# Patient Record
Sex: MALE | Race: WHITE | NOT HISPANIC OR LATINO
[De-identification: names, ages, dates, MRNs, and addresses within clinical notes are randomized per-mention and may not be internally consistent; named-entity substitution may affect disease eponyms.]

---

## 2017-03-08 DIAGNOSIS — Z82.49 FAMILY HISTORY OF ISCHEMIC HEART DISEASE AND OTHER DISEASES OF THE CIRCULATORY SYSTEM: ICD-10-CM

## 2017-03-08 DIAGNOSIS — Z87.19 PERSONAL HISTORY OF OTHER DISEASES OF THE DIGESTIVE SYSTEM: ICD-10-CM

## 2017-03-09 ENCOUNTER — APPOINTMENT (OUTPATIENT)
Dept: PULMONOLOGY | Facility: CLINIC | Age: 67
End: 2017-03-09

## 2017-03-09 VITALS
OXYGEN SATURATION: 90 % | DIASTOLIC BLOOD PRESSURE: 74 MMHG | HEART RATE: 117 BPM | HEIGHT: 69 IN | BODY MASS INDEX: 28.58 KG/M2 | SYSTOLIC BLOOD PRESSURE: 144 MMHG | WEIGHT: 193 LBS

## 2017-03-28 ENCOUNTER — APPOINTMENT (OUTPATIENT)
Dept: PULMONOLOGY | Facility: CLINIC | Age: 67
End: 2017-03-28

## 2017-05-17 ENCOUNTER — APPOINTMENT (OUTPATIENT)
Dept: PULMONOLOGY | Facility: CLINIC | Age: 67
End: 2017-05-17

## 2017-05-17 VITALS
WEIGHT: 187 LBS | DIASTOLIC BLOOD PRESSURE: 80 MMHG | SYSTOLIC BLOOD PRESSURE: 120 MMHG | BODY MASS INDEX: 27.62 KG/M2 | OXYGEN SATURATION: 96 % | HEART RATE: 91 BPM

## 2017-05-17 RX ORDER — MECLIZINE HYDROCHLORIDE 25 MG/1
25 TABLET ORAL
Qty: 12 | Refills: 0 | Status: DISCONTINUED | COMMUNITY
Start: 2017-02-02

## 2017-05-17 RX ORDER — PREDNISONE 10 MG/1
10 TABLET ORAL
Qty: 12 | Refills: 0 | Status: DISCONTINUED | COMMUNITY
Start: 2017-03-22

## 2017-05-25 ENCOUNTER — CLINICAL ADVICE (OUTPATIENT)
Age: 67
End: 2017-05-25

## 2017-08-28 ENCOUNTER — APPOINTMENT (OUTPATIENT)
Dept: INTERNAL MEDICINE | Facility: CLINIC | Age: 67
End: 2017-08-28
Payer: COMMERCIAL

## 2017-08-28 PROCEDURE — 36415 COLL VENOUS BLD VENIPUNCTURE: CPT

## 2017-08-28 PROCEDURE — 99204 OFFICE O/P NEW MOD 45 MIN: CPT | Mod: 25

## 2017-09-08 ENCOUNTER — TRANSCRIPTION ENCOUNTER (OUTPATIENT)
Age: 67
End: 2017-09-08

## 2017-09-11 ENCOUNTER — OUTPATIENT (OUTPATIENT)
Dept: OUTPATIENT SERVICES | Facility: HOSPITAL | Age: 67
LOS: 1 days | End: 2017-09-11
Payer: COMMERCIAL

## 2017-09-11 VITALS
TEMPERATURE: 97 F | HEART RATE: 96 BPM | SYSTOLIC BLOOD PRESSURE: 122 MMHG | WEIGHT: 216.93 LBS | HEIGHT: 73 IN | DIASTOLIC BLOOD PRESSURE: 80 MMHG | RESPIRATION RATE: 16 BRPM

## 2017-09-11 DIAGNOSIS — I73.9 PERIPHERAL VASCULAR DISEASE, UNSPECIFIED: Chronic | ICD-10-CM

## 2017-09-11 DIAGNOSIS — Z98.890 OTHER SPECIFIED POSTPROCEDURAL STATES: Chronic | ICD-10-CM

## 2017-09-11 DIAGNOSIS — Z01.818 ENCOUNTER FOR OTHER PREPROCEDURAL EXAMINATION: ICD-10-CM

## 2017-09-11 LAB
APTT BLD: 41 SEC — HIGH (ref 27.5–37.4)
BASOPHILS # BLD AUTO: 0 K/UL — SIGNIFICANT CHANGE UP (ref 0–0.2)
BASOPHILS NFR BLD AUTO: 0.2 % — SIGNIFICANT CHANGE UP (ref 0–2)
EOSINOPHIL # BLD AUTO: 0.6 K/UL — HIGH (ref 0–0.5)
EOSINOPHIL NFR BLD AUTO: 4.3 % — SIGNIFICANT CHANGE UP (ref 0–5)
HCT VFR BLD CALC: 40.7 % — LOW (ref 42–52)
HGB BLD-MCNC: 14.2 G/DL — SIGNIFICANT CHANGE UP (ref 14–18)
INR BLD: 1.08 RATIO — SIGNIFICANT CHANGE UP (ref 0.88–1.16)
LYMPHOCYTES # BLD AUTO: 2.6 K/UL — SIGNIFICANT CHANGE UP (ref 1–4.8)
LYMPHOCYTES # BLD AUTO: 20.3 % — SIGNIFICANT CHANGE UP (ref 20–55)
MCHC RBC-ENTMCNC: 30.6 PG — SIGNIFICANT CHANGE UP (ref 27–31)
MCHC RBC-ENTMCNC: 34.9 G/DL — SIGNIFICANT CHANGE UP (ref 32–36)
MCV RBC AUTO: 87.7 FL — SIGNIFICANT CHANGE UP (ref 80–94)
MONOCYTES # BLD AUTO: 0.6 K/UL — SIGNIFICANT CHANGE UP (ref 0–0.8)
MONOCYTES NFR BLD AUTO: 4.4 % — SIGNIFICANT CHANGE UP (ref 3–10)
NEUTROPHILS # BLD AUTO: 8.9 K/UL — HIGH (ref 1.8–8)
NEUTROPHILS NFR BLD AUTO: 70.6 % — SIGNIFICANT CHANGE UP (ref 37–73)
PLATELET # BLD AUTO: 183 K/UL — SIGNIFICANT CHANGE UP (ref 150–400)
PROTHROM AB SERPL-ACNC: 11.9 SEC — SIGNIFICANT CHANGE UP (ref 9.8–12.7)
RBC # BLD: 4.64 M/UL — SIGNIFICANT CHANGE UP (ref 4.6–6.2)
RBC # FLD: 14.6 % — SIGNIFICANT CHANGE UP (ref 11–15.6)
WBC # BLD: 12.7 K/UL — HIGH (ref 4.8–10.8)
WBC # FLD AUTO: 12.7 K/UL — HIGH (ref 4.8–10.8)

## 2017-09-11 PROCEDURE — 36415 COLL VENOUS BLD VENIPUNCTURE: CPT

## 2017-09-11 PROCEDURE — G0463: CPT

## 2017-09-11 PROCEDURE — 85610 PROTHROMBIN TIME: CPT

## 2017-09-11 PROCEDURE — 85027 COMPLETE CBC AUTOMATED: CPT

## 2017-09-11 PROCEDURE — 85730 THROMBOPLASTIN TIME PARTIAL: CPT

## 2017-09-11 RX ORDER — SODIUM CHLORIDE 9 MG/ML
3 INJECTION INTRAMUSCULAR; INTRAVENOUS; SUBCUTANEOUS ONCE
Qty: 0 | Refills: 0 | Status: DISCONTINUED | OUTPATIENT
Start: 2017-09-19 | End: 2017-10-04

## 2017-09-11 NOTE — H&P PST ADULT - NSANTHOSAYNRD_GEN_A_CORE
No. DYAN screening performed.  STOP BANG Legend: 0-2 = LOW Risk; 3-4 = INTERMEDIATE Risk; 5-8 = HIGH Risk

## 2017-09-11 NOTE — ASU PATIENT PROFILE, ADULT - PMH
Bipolar 1 disorder    Bladder cancer  2009  Cataract    Emphysema of lung    Lung cancer  2014  PVD (peripheral vascular disease)  stents X2  Sinus infection    Thyroid nodule

## 2017-09-11 NOTE — H&P PST ADULT - PMH
Bipolar 1 disorder    Bladder cancer  2009  Emphysema of lung    Lung cancer  2014  PAD (peripheral artery disease)  stents X2 2011  Sinus infection Bipolar 1 disorder    Bladder cancer  2009  Cataract    Emphysema of lung    Lung cancer  2014  PVD (peripheral vascular disease)  stents X2  Sinus infection    Thyroid nodule

## 2017-09-11 NOTE — H&P PST ADULT - HISTORY OF PRESENT ILLNESS
Pt is a 67 y.o male with increase confusion and forgetfullness starting January of this year as per the wife. +RPR as per psychologist. Now schedule for lumbar puncture.

## 2017-09-11 NOTE — H&P PST ADULT - FAMILY HISTORY
Sibling  Still living? Yes, Estimated age: Age Unknown  Family history of other heart disease, Age at diagnosis: Age Unknown  Family history of diabetes mellitus, Age at diagnosis: 51-60     Father  Still living? No  Family history of lung cancer, Age at diagnosis: 61-70

## 2017-09-13 ENCOUNTER — APPOINTMENT (OUTPATIENT)
Dept: PULMONOLOGY | Facility: CLINIC | Age: 67
End: 2017-09-13

## 2017-09-19 ENCOUNTER — OUTPATIENT (OUTPATIENT)
Dept: OUTPATIENT SERVICES | Facility: HOSPITAL | Age: 67
LOS: 1 days | End: 2017-09-19
Payer: COMMERCIAL

## 2017-09-19 VITALS
HEART RATE: 84 BPM | DIASTOLIC BLOOD PRESSURE: 75 MMHG | SYSTOLIC BLOOD PRESSURE: 134 MMHG | OXYGEN SATURATION: 98 % | RESPIRATION RATE: 14 BRPM

## 2017-09-19 VITALS
DIASTOLIC BLOOD PRESSURE: 79 MMHG | TEMPERATURE: 98 F | HEIGHT: 73 IN | HEART RATE: 91 BPM | SYSTOLIC BLOOD PRESSURE: 110 MMHG | RESPIRATION RATE: 16 BRPM | WEIGHT: 216.93 LBS | OXYGEN SATURATION: 100 %

## 2017-09-19 DIAGNOSIS — Z98.890 OTHER SPECIFIED POSTPROCEDURAL STATES: Chronic | ICD-10-CM

## 2017-09-19 DIAGNOSIS — A52.8 LATE SYPHILIS, LATENT: ICD-10-CM

## 2017-09-19 DIAGNOSIS — I73.9 PERIPHERAL VASCULAR DISEASE, UNSPECIFIED: Chronic | ICD-10-CM

## 2017-09-19 DIAGNOSIS — Z01.818 ENCOUNTER FOR OTHER PREPROCEDURAL EXAMINATION: ICD-10-CM

## 2017-09-19 LAB
APPEARANCE CSF: CLEAR — SIGNIFICANT CHANGE UP
COLOR CSF: SIGNIFICANT CHANGE UP
CRYPTOC AG CSF-ACNC: NEGATIVE — SIGNIFICANT CHANGE UP
GLUCOSE CSF-MCNC: 54 MG/DL — SIGNIFICANT CHANGE UP (ref 40–70)
GRAM STN FLD: SIGNIFICANT CHANGE UP
NEUTROPHILS # CSF: SIGNIFICANT CHANGE UP %
NRBC NFR CSF: 0 /UL — SIGNIFICANT CHANGE UP (ref 0–5)
PROT CSF-MCNC: 88 MG/DL — HIGH (ref 15–45)
RBC # CSF: 0 /CMM — SIGNIFICANT CHANGE UP (ref 0–1)
SPECIMEN SOURCE: SIGNIFICANT CHANGE UP
TUBE TYPE: SIGNIFICANT CHANGE UP

## 2017-09-19 PROCEDURE — 83615 LACTATE (LD) (LDH) ENZYME: CPT

## 2017-09-19 PROCEDURE — 62270 DX LMBR SPI PNXR: CPT

## 2017-09-19 PROCEDURE — 86592 SYPHILIS TEST NON-TREP QUAL: CPT

## 2017-09-19 PROCEDURE — 89051 BODY FLUID CELL COUNT: CPT

## 2017-09-19 PROCEDURE — 82945 GLUCOSE OTHER FLUID: CPT

## 2017-09-19 PROCEDURE — 87116 MYCOBACTERIA CULTURE: CPT

## 2017-09-19 PROCEDURE — 77003 FLUOROGUIDE FOR SPINE INJECT: CPT | Mod: 26

## 2017-09-19 PROCEDURE — 84157 ASSAY OF PROTEIN OTHER: CPT

## 2017-09-19 PROCEDURE — 86403 PARTICLE AGGLUT ANTBDY SCRN: CPT

## 2017-09-19 PROCEDURE — 87070 CULTURE OTHR SPECIMN AEROBIC: CPT

## 2017-09-19 PROCEDURE — 87205 SMEAR GRAM STAIN: CPT

## 2017-09-19 PROCEDURE — 87102 FUNGUS ISOLATION CULTURE: CPT

## 2017-09-19 PROCEDURE — 77003 FLUOROGUIDE FOR SPINE INJECT: CPT

## 2017-09-19 RX ORDER — OXYCODONE AND ACETAMINOPHEN 5; 325 MG/1; MG/1
1 TABLET ORAL ONCE
Qty: 0 | Refills: 0 | Status: DISCONTINUED | OUTPATIENT
Start: 2017-09-19 | End: 2017-09-19

## 2017-09-19 NOTE — ASU DISCHARGE PLAN (ADULT/PEDIATRIC). - NOTIFY
Bleeding that does not stop/worsening headache/Persistent Nausea and Vomiting/Fever greater than 101

## 2017-09-19 NOTE — ASU DISCHARGE PLAN (ADULT/PEDIATRIC). - MEDICATION SUMMARY - MEDICATIONS TO TAKE
I will START or STAY ON the medications listed below when I get home from the hospital:    clonazePAM  -- 1 milligram(s) by mouth 2 times a day  -- Indication: For as per PMD    ziprasidone  -- 60 milligram(s) by mouth once a day  -- Indication: For as per PMD    pantoprazole 40 mg oral delayed release tablet  -- 1 tab(s) by mouth once a day  -- Indication: For as per PMD

## 2017-09-20 LAB
NIGHT BLUE STAIN TISS: SIGNIFICANT CHANGE UP
SPECIMEN SOURCE: SIGNIFICANT CHANGE UP

## 2017-09-21 LAB
LDH SERPL L TO P-CCNC: 19 U/L — SIGNIFICANT CHANGE UP
VDRL CSF-TITR: NEGATIVE — SIGNIFICANT CHANGE UP

## 2017-09-23 LAB
CULTURE RESULTS: SIGNIFICANT CHANGE UP
SPECIMEN SOURCE: SIGNIFICANT CHANGE UP

## 2017-09-26 ENCOUNTER — APPOINTMENT (OUTPATIENT)
Dept: INTERNAL MEDICINE | Facility: CLINIC | Age: 67
End: 2017-09-26
Payer: COMMERCIAL

## 2017-09-26 PROCEDURE — 99214 OFFICE O/P EST MOD 30 MIN: CPT

## 2017-10-02 ENCOUNTER — OUTPATIENT (OUTPATIENT)
Dept: OUTPATIENT SERVICES | Facility: HOSPITAL | Age: 67
LOS: 1 days | End: 2017-10-02
Payer: MEDICARE

## 2017-10-02 DIAGNOSIS — Z98.890 OTHER SPECIFIED POSTPROCEDURAL STATES: Chronic | ICD-10-CM

## 2017-10-02 DIAGNOSIS — A52.3 NEUROSYPHILIS, UNSPECIFIED: ICD-10-CM

## 2017-10-02 DIAGNOSIS — I73.9 PERIPHERAL VASCULAR DISEASE, UNSPECIFIED: Chronic | ICD-10-CM

## 2017-10-02 PROCEDURE — 71010: CPT | Mod: 26

## 2017-10-02 PROCEDURE — 71045 X-RAY EXAM CHEST 1 VIEW: CPT

## 2017-10-02 PROCEDURE — 36569 INSJ PICC 5 YR+ W/O IMAGING: CPT

## 2017-10-02 RX ORDER — PENICILLIN G POTASSIUM 5000000 [IU]/1
2.4 POWDER, FOR SOLUTION INTRAMUSCULAR; INTRAPLEURAL; INTRATHECAL; INTRAVENOUS ONCE
Qty: 0 | Refills: 0 | Status: DISCONTINUED | OUTPATIENT
Start: 2017-10-02 | End: 2017-10-17

## 2017-10-03 NOTE — PROCEDURE NOTE - PROCEDURE
<<-----Click on this checkbox to enter Procedure PICC line placement  10/03/2017    Active  [id number]

## 2017-10-03 NOTE — PROCEDURE NOTE - NSPROCDETAILS_GEN_ALL_CORE
sterile dressing applied/sterile technique, catheter placed/ultrasound guidance/ultrasound assessment/location identified, draped/prepped, sterile technique used/supine position

## 2017-10-16 LAB
CULTURE RESULTS: SIGNIFICANT CHANGE UP
SPECIMEN SOURCE: SIGNIFICANT CHANGE UP

## 2017-10-26 ENCOUNTER — APPOINTMENT (OUTPATIENT)
Dept: INTERNAL MEDICINE | Facility: CLINIC | Age: 67
End: 2017-10-26
Payer: COMMERCIAL

## 2017-10-26 PROCEDURE — 99213 OFFICE O/P EST LOW 20 MIN: CPT

## 2017-11-08 LAB
CULTURE RESULTS: SIGNIFICANT CHANGE UP
SPECIMEN SOURCE: SIGNIFICANT CHANGE UP

## 2017-12-04 NOTE — ASU PREOP CHECKLIST - NOTHING BY MOUTH SINCE
18-Sep-2017 06:00 Problem: Patient Care Overview (Adult)  Goal: Plan of Care Review  Outcome: Ongoing (interventions implemented as appropriate)

## 2018-06-13 ENCOUNTER — APPOINTMENT (OUTPATIENT)
Dept: PULMONOLOGY | Facility: CLINIC | Age: 68
End: 2018-06-13
Payer: COMMERCIAL

## 2018-06-13 VITALS — HEIGHT: 69 IN | WEIGHT: 177 LBS | BODY MASS INDEX: 26.22 KG/M2

## 2018-06-13 VITALS — OXYGEN SATURATION: 96 % | DIASTOLIC BLOOD PRESSURE: 70 MMHG | HEART RATE: 103 BPM | SYSTOLIC BLOOD PRESSURE: 120 MMHG

## 2018-06-13 DIAGNOSIS — Z01.818 ENCOUNTER FOR OTHER PREPROCEDURAL EXAMINATION: ICD-10-CM

## 2018-06-13 DIAGNOSIS — J44.9 CHRONIC OBSTRUCTIVE PULMONARY DISEASE, UNSPECIFIED: ICD-10-CM

## 2018-06-13 DIAGNOSIS — Z00.00 ENCOUNTER FOR GENERAL ADULT MEDICAL EXAMINATION W/OUT ABNORMAL FINDINGS: ICD-10-CM

## 2018-06-13 PROCEDURE — 94727 GAS DIL/WSHOT DETER LNG VOL: CPT

## 2018-06-13 PROCEDURE — 99214 OFFICE O/P EST MOD 30 MIN: CPT | Mod: 25

## 2018-06-13 PROCEDURE — 85018 HEMOGLOBIN: CPT | Mod: QW

## 2018-06-13 PROCEDURE — 94729 DIFFUSING CAPACITY: CPT

## 2018-06-13 PROCEDURE — 99406 BEHAV CHNG SMOKING 3-10 MIN: CPT

## 2018-06-13 PROCEDURE — 94010 BREATHING CAPACITY TEST: CPT

## 2018-06-13 RX ORDER — CEPHALEXIN 500 MG/1
500 CAPSULE ORAL
Qty: 12 | Refills: 0 | Status: DISCONTINUED | COMMUNITY
Start: 2018-03-04 | End: 2018-06-13

## 2018-06-13 RX ORDER — NAPROXEN 500 MG/1
500 TABLET ORAL
Qty: 20 | Refills: 0 | Status: DISCONTINUED | COMMUNITY
Start: 2018-04-16 | End: 2018-06-13

## 2018-06-13 RX ORDER — LAMOTRIGINE 150 MG/1
150 TABLET ORAL
Refills: 0 | Status: DISCONTINUED | COMMUNITY
End: 2018-06-13

## 2018-06-13 RX ORDER — DICLOFENAC SODIUM 10 MG/G
1 GEL TOPICAL
Qty: 100 | Refills: 0 | Status: DISCONTINUED | COMMUNITY
Start: 2018-01-23 | End: 2018-06-13

## 2018-06-13 RX ORDER — ZIPRASIDONE 20 MG/1
CAPSULE ORAL
Refills: 0 | Status: DISCONTINUED | COMMUNITY
End: 2018-06-13

## 2018-06-13 RX ORDER — LEVOFLOXACIN 750 MG/1
750 TABLET, FILM COATED ORAL
Qty: 7 | Refills: 0 | Status: DISCONTINUED | COMMUNITY
Start: 2018-01-02 | End: 2018-06-13

## 2018-10-18 PROBLEM — I73.9 PERIPHERAL VASCULAR DISEASE, UNSPECIFIED: Chronic | Status: ACTIVE | Noted: 2017-09-11

## 2018-10-18 PROBLEM — H26.9 UNSPECIFIED CATARACT: Chronic | Status: ACTIVE | Noted: 2017-09-11

## 2018-10-23 ENCOUNTER — APPOINTMENT (OUTPATIENT)
Dept: INTERNAL MEDICINE | Facility: CLINIC | Age: 68
End: 2018-10-23
Payer: COMMERCIAL

## 2018-10-23 VITALS
SYSTOLIC BLOOD PRESSURE: 110 MMHG | BODY MASS INDEX: 26.22 KG/M2 | WEIGHT: 177 LBS | DIASTOLIC BLOOD PRESSURE: 70 MMHG | HEIGHT: 69 IN

## 2018-10-23 DIAGNOSIS — Z86.19 PERSONAL HISTORY OF OTHER INFECTIOUS AND PARASITIC DISEASES: ICD-10-CM

## 2018-10-23 PROCEDURE — 36415 COLL VENOUS BLD VENIPUNCTURE: CPT

## 2018-10-23 PROCEDURE — 99214 OFFICE O/P EST MOD 30 MIN: CPT | Mod: 25

## 2018-10-23 RX ORDER — LURASIDONE HYDROCHLORIDE 20 MG/1
20 TABLET, FILM COATED ORAL
Qty: 180 | Refills: 0 | Status: DISCONTINUED | COMMUNITY
Start: 2018-01-03 | End: 2018-10-17

## 2018-10-23 RX ORDER — CLONAZEPAM 0.5 MG/1
0.5 TABLET ORAL
Refills: 0 | Status: DISCONTINUED | COMMUNITY
End: 2018-10-23

## 2018-10-25 LAB — RPR SER-TITR: ABNORMAL

## 2019-04-29 ENCOUNTER — TRANSCRIPTION ENCOUNTER (OUTPATIENT)
Age: 69
End: 2019-04-29

## 2019-11-22 ENCOUNTER — RECORD ABSTRACTING (OUTPATIENT)
Age: 69
End: 2019-11-22

## 2019-11-22 DIAGNOSIS — Z82.49 FAMILY HISTORY OF ISCHEMIC HEART DISEASE AND OTHER DISEASES OF THE CIRCULATORY SYSTEM: ICD-10-CM

## 2019-11-22 DIAGNOSIS — Z78.9 OTHER SPECIFIED HEALTH STATUS: ICD-10-CM

## 2019-11-22 DIAGNOSIS — Z85.118 PERSONAL HISTORY OF OTHER MALIGNANT NEOPLASM OF BRONCHUS AND LUNG: ICD-10-CM

## 2019-11-22 DIAGNOSIS — Z83.3 FAMILY HISTORY OF DIABETES MELLITUS: ICD-10-CM

## 2019-11-25 ENCOUNTER — APPOINTMENT (OUTPATIENT)
Dept: CARDIOLOGY | Facility: CLINIC | Age: 69
End: 2019-11-25
Payer: MEDICARE

## 2019-11-25 ENCOUNTER — NON-APPOINTMENT (OUTPATIENT)
Age: 69
End: 2019-11-25

## 2019-11-25 VITALS
BODY MASS INDEX: 26.96 KG/M2 | SYSTOLIC BLOOD PRESSURE: 113 MMHG | RESPIRATION RATE: 15 BRPM | WEIGHT: 182 LBS | DIASTOLIC BLOOD PRESSURE: 78 MMHG | HEIGHT: 69 IN | HEART RATE: 99 BPM

## 2019-11-25 DIAGNOSIS — C67.9 MALIGNANT NEOPLASM OF BLADDER, UNSPECIFIED: ICD-10-CM

## 2019-11-25 DIAGNOSIS — Z82.49 FAMILY HISTORY OF ISCHEMIC HEART DISEASE AND OTHER DISEASES OF THE CIRCULATORY SYSTEM: ICD-10-CM

## 2019-11-25 PROCEDURE — 93000 ELECTROCARDIOGRAM COMPLETE: CPT

## 2019-11-25 PROCEDURE — 99205 OFFICE O/P NEW HI 60 MIN: CPT

## 2019-11-25 NOTE — ASSESSMENT
[FreeTextEntry1] : ECG: SR, LBBB \par \par  HDL 30 LDL 98  (11/2019) \par \par CT Chest 2018:  Scattered aortic calcifications and mural thrombus \par \par \par

## 2019-11-25 NOTE — PHYSICAL EXAM
[Normal Conjunctiva] : the conjunctiva exhibited no abnormalities [General Appearance - Well Developed] : well developed [General Appearance - Well Nourished] : well nourished [Normal Oropharynx] : normal oropharynx [Normal Jugular Venous V Waves Present] : normal jugular venous V waves present [Heart Rate And Rhythm] : heart rate and rhythm were normal [] : no respiratory distress [Respiration, Rhythm And Depth] : normal respiratory rhythm and effort [Abdomen Soft] : soft [Bowel Sounds] : normal bowel sounds [Abnormal Walk] : normal gait [Nail Clubbing] : no clubbing of the fingernails [Cyanosis, Localized] : no localized cyanosis [Skin Color & Pigmentation] : normal skin color and pigmentation [Oriented To Time, Place, And Person] : oriented to person, place, and time [Affect] : the affect was normal [Murmurs] : no murmurs present [FreeTextEntry1] : no edema. DP 1= bilaterally, cap refill = 3 seconds bilaterally

## 2019-11-25 NOTE — DISCUSSION/SUMMARY
[FreeTextEntry1] : Patient is a 68 yo M with tobacco dependence, HLD, COPD, aortic atherosclerosis with mural thrombus, PAD s/p PCI here for cardiac evaluation of a LBBB on ECG. PAtient had revascularization of his LE as well with signs of PAD on exam but no clear symptoms. Remains with limited functional capacity since chemo from lung cancer. STEVENSON partly COPD but needs ischemic eval for this and ECG findings. Will also evaluate his PAD. \par \par 1. Echo and pharm nuclear stress test to evaluate LBBB/STEVENSON in patient with smoking/atherosclerosis/HTN/HLD\par 2. PAD testing to evaluate known disease with aortic scan, LE duplex and EMILEE\par 3. Start ASA 81mg daily\par 4. Continue statin, recheck lipids with PMD as just started. May need fibrate/fish oils for TG\par 5. Recommend aggressive diet and lifestyle modifications  \par 6. Counselled for more than 3 minutes on smoking cessation\par 7. Obtain recent carotid duplex that was done to review\par 8. Follow up after testing\par 9. Regular PMD, ID and pulmonary follow up

## 2019-12-10 ENCOUNTER — APPOINTMENT (OUTPATIENT)
Dept: CARDIOLOGY | Facility: CLINIC | Age: 69
End: 2019-12-10
Payer: COMMERCIAL

## 2019-12-10 PROCEDURE — 93923 UPR/LXTR ART STDY 3+ LVLS: CPT

## 2019-12-12 ENCOUNTER — APPOINTMENT (OUTPATIENT)
Dept: CARDIOLOGY | Facility: CLINIC | Age: 69
End: 2019-12-12
Payer: COMMERCIAL

## 2019-12-12 PROCEDURE — 93925 LOWER EXTREMITY STUDY: CPT

## 2019-12-19 ENCOUNTER — APPOINTMENT (OUTPATIENT)
Dept: NEUROLOGY | Facility: CLINIC | Age: 69
End: 2019-12-19

## 2019-12-30 ENCOUNTER — APPOINTMENT (OUTPATIENT)
Dept: CARDIOLOGY | Facility: CLINIC | Age: 69
End: 2019-12-30
Payer: COMMERCIAL

## 2019-12-30 PROCEDURE — 78452 HT MUSCLE IMAGE SPECT MULT: CPT

## 2019-12-30 PROCEDURE — A9500: CPT

## 2019-12-30 PROCEDURE — 93015 CV STRESS TEST SUPVJ I&R: CPT

## 2019-12-30 RX ORDER — REGADENOSON 0.08 MG/ML
0.4 INJECTION, SOLUTION INTRAVENOUS
Qty: 4 | Refills: 0 | Status: COMPLETED | OUTPATIENT
Start: 2019-12-30

## 2019-12-30 RX ADMIN — REGADENOSON 0 MG/5ML: 0.08 INJECTION, SOLUTION INTRAVENOUS at 00:00

## 2019-12-31 RX ORDER — KIT FOR THE PREPARATION OF TECHNETIUM TC99M SESTAMIBI 1 MG/5ML
INJECTION, POWDER, LYOPHILIZED, FOR SOLUTION PARENTERAL
Refills: 0 | Status: COMPLETED | OUTPATIENT
Start: 2019-12-31

## 2019-12-31 RX ADMIN — KIT FOR THE PREPARATION OF TECHNETIUM TC99M SESTAMIBI 0: 1 INJECTION, POWDER, LYOPHILIZED, FOR SOLUTION PARENTERAL at 00:00

## 2020-01-09 ENCOUNTER — APPOINTMENT (OUTPATIENT)
Dept: CARDIOLOGY | Facility: CLINIC | Age: 70
End: 2020-01-09
Payer: COMMERCIAL

## 2020-01-09 PROCEDURE — 93351 STRESS TTE COMPLETE: CPT

## 2020-01-09 PROCEDURE — 93978 VASCULAR STUDY: CPT

## 2020-01-15 ENCOUNTER — NON-APPOINTMENT (OUTPATIENT)
Age: 70
End: 2020-01-15

## 2020-01-15 ENCOUNTER — APPOINTMENT (OUTPATIENT)
Dept: CARDIOLOGY | Facility: CLINIC | Age: 70
End: 2020-01-15
Payer: COMMERCIAL

## 2020-01-15 VITALS
SYSTOLIC BLOOD PRESSURE: 110 MMHG | RESPIRATION RATE: 14 BRPM | WEIGHT: 185 LBS | HEART RATE: 95 BPM | HEIGHT: 69 IN | DIASTOLIC BLOOD PRESSURE: 76 MMHG | BODY MASS INDEX: 27.4 KG/M2

## 2020-01-15 DIAGNOSIS — R94.31 ABNORMAL ELECTROCARDIOGRAM [ECG] [EKG]: ICD-10-CM

## 2020-01-15 DIAGNOSIS — R06.09 OTHER FORMS OF DYSPNEA: ICD-10-CM

## 2020-01-15 PROCEDURE — 93000 ELECTROCARDIOGRAM COMPLETE: CPT

## 2020-01-15 PROCEDURE — 99215 OFFICE O/P EST HI 40 MIN: CPT

## 2020-01-15 NOTE — DISCUSSION/SUMMARY
[FreeTextEntry1] : Patient is a 68 yo M with tobacco dependence, HLD, COPD, aortic atherosclerosis, AAA with mural thrombus,  PAD s/p PCI here for cardiac evaluation of a LBBB on ECG. Testing shows severe CMP and possibly ischemia with signs inferior infarct. CMP maybe ischemic, related to prior EtOH and substance abuse years ago, genetic. No recent infection due suggest myocarditis, will consider MRI. \par Patient denies symptoms but not very active. Wife notes he has been more short of breath and fatigued\par \par Has AAA with stent and may have further inflow disease not seen on imaging. Appears that patient had iliac disease as stent visualized in the distal aorta and appears to extend into iliac arteries.  NEeds to see vascular. No claudication at this time and mildly low EMILEE. Appears to have had iliac disease treated percutaneously and AAA with mural thrombus medically managed. \par \par . \par \par 1. LEft heart cath  to evaluate CMP and nuclear stress test findings suggestive of prior inferior infarct\par 2. Vascular evaluation, refer to Dr Sahu. \par 3. ASA/statin, increase atorvastatin to 20mg qhs\par 4. Start entresto 24/26mg bid and coreg 6.25mg po bid for CMP, check BMP/Mg in a couple weeks\par 5. Counselled for more than 3 minutes on smoking cessation\par 6. Follow up after cath\par 7. REgular PMD follow up\par

## 2020-01-15 NOTE — PHYSICAL EXAM
[General Appearance - Well Developed] : well developed [General Appearance - Well Nourished] : well nourished [Normal Conjunctiva] : the conjunctiva exhibited no abnormalities [Normal Jugular Venous V Waves Present] : normal jugular venous V waves present [Normal Oropharynx] : normal oropharynx [Respiration, Rhythm And Depth] : normal respiratory rhythm and effort [] : no respiratory distress [Heart Rate And Rhythm] : heart rate and rhythm were normal [Murmurs] : no murmurs present [Abdomen Soft] : soft [Bowel Sounds] : normal bowel sounds [Abnormal Walk] : normal gait [Nail Clubbing] : no clubbing of the fingernails [Cyanosis, Localized] : no localized cyanosis [Oriented To Time, Place, And Person] : oriented to person, place, and time [Skin Color & Pigmentation] : normal skin color and pigmentation [Affect] : the affect was normal [FreeTextEntry1] : distant S1S2

## 2020-01-15 NOTE — ASSESSMENT
[FreeTextEntry1] : ECG: SR, LBBB \par \par  HDL 30 LDL 98  (11/2019) \par \par CT Chest 2018:  Scattered aortic calcifications and mural thrombus \par \par ECHO 1/2020:\par 1. SEvere LV dysfunction, EF 20-25% with sevrere inferior/inferolateral hypo\par 2. Normal RV/LA/RA\par 3. Mild TR\par \par AORTIC SCAN\par 1. AAA with stent and mural thrombus\par \par NUCLEAR STRESS TEST 1/2020:\par 1.  INferior/inferoseptal infarct with EF 22%\par 2. No ischemia\par \par LE ARTERIAL DUPLEX\par 1. MOderate plaque\par 2. No hemodynamically significant stenosis\par \par EMILEE/PVR\par 1. R 0.86\par 2. L 0.82\par 3. PVR suggestive of inflow disease\par \par

## 2020-01-15 NOTE — HISTORY OF PRESENT ILLNESS
[FreeTextEntry1] : Patient is a 69 yo M with tobacco dependence,  HLD, COPD, PAD s/p PCI here for cardiac evaluation. Struggles with being able to quit smoking. On disability for lung cancer, now in remission. Had chemotherapy and under control he states. 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 Wife notes he is short of breath going up stairs. PAtient doesn’t notice it but very limited in activity. NO chest discomfort. Patient denies PND/orthopnea/edema/palpitations/syncope/claudication. Had been very active prior to chemotherapy in 2014, has been much less since then. Also memory a bit worse since. \par \par Symptoms stable since last visit. Patient underwent cardiac testing after last visit. \par \par ROS: GI and  negative

## 2020-01-29 ENCOUNTER — OUTPATIENT (OUTPATIENT)
Dept: OUTPATIENT SERVICES | Facility: HOSPITAL | Age: 70
LOS: 1 days | End: 2020-01-29
Payer: COMMERCIAL

## 2020-01-29 VITALS
SYSTOLIC BLOOD PRESSURE: 96 MMHG | DIASTOLIC BLOOD PRESSURE: 59 MMHG | TEMPERATURE: 98 F | HEIGHT: 73 IN | WEIGHT: 182.1 LBS | RESPIRATION RATE: 18 BRPM | OXYGEN SATURATION: 99 % | HEART RATE: 86 BPM

## 2020-01-29 DIAGNOSIS — Z86.79 PERSONAL HISTORY OF OTHER DISEASES OF THE CIRCULATORY SYSTEM: Chronic | ICD-10-CM

## 2020-01-29 DIAGNOSIS — R94.39 ABNORMAL RESULT OF OTHER CARDIOVASCULAR FUNCTION STUDY: ICD-10-CM

## 2020-01-29 DIAGNOSIS — Z90.89 ACQUIRED ABSENCE OF OTHER ORGANS: Chronic | ICD-10-CM

## 2020-01-29 DIAGNOSIS — Z98.890 OTHER SPECIFIED POSTPROCEDURAL STATES: Chronic | ICD-10-CM

## 2020-01-29 DIAGNOSIS — Z01.818 ENCOUNTER FOR OTHER PREPROCEDURAL EXAMINATION: ICD-10-CM

## 2020-01-29 DIAGNOSIS — I73.9 PERIPHERAL VASCULAR DISEASE, UNSPECIFIED: Chronic | ICD-10-CM

## 2020-01-29 LAB
ANION GAP SERPL CALC-SCNC: 10 MMOL/L — SIGNIFICANT CHANGE UP (ref 5–17)
APTT BLD: 47.5 SEC — HIGH (ref 27.5–36.3)
BASOPHILS # BLD AUTO: 0.06 K/UL — SIGNIFICANT CHANGE UP (ref 0–0.2)
BASOPHILS NFR BLD AUTO: 0.7 % — SIGNIFICANT CHANGE UP (ref 0–2)
BUN SERPL-MCNC: 21 MG/DL — HIGH (ref 8–20)
CALCIUM SERPL-MCNC: 9.8 MG/DL — SIGNIFICANT CHANGE UP (ref 8.6–10.2)
CHLORIDE SERPL-SCNC: 102 MMOL/L — SIGNIFICANT CHANGE UP (ref 98–107)
CO2 SERPL-SCNC: 24 MMOL/L — SIGNIFICANT CHANGE UP (ref 22–29)
CREAT SERPL-MCNC: 1.07 MG/DL — SIGNIFICANT CHANGE UP (ref 0.5–1.3)
EOSINOPHIL # BLD AUTO: 0.45 K/UL — SIGNIFICANT CHANGE UP (ref 0–0.5)
EOSINOPHIL NFR BLD AUTO: 5.3 % — SIGNIFICANT CHANGE UP (ref 0–6)
GLUCOSE SERPL-MCNC: 103 MG/DL — HIGH (ref 70–99)
HCT VFR BLD CALC: 43.1 % — SIGNIFICANT CHANGE UP (ref 39–50)
HGB BLD-MCNC: 14.3 G/DL — SIGNIFICANT CHANGE UP (ref 13–17)
IMM GRANULOCYTES NFR BLD AUTO: 0.1 % — SIGNIFICANT CHANGE UP (ref 0–1.5)
INR BLD: 1.11 RATIO — SIGNIFICANT CHANGE UP (ref 0.88–1.16)
LYMPHOCYTES # BLD AUTO: 2.75 K/UL — SIGNIFICANT CHANGE UP (ref 1–3.3)
LYMPHOCYTES # BLD AUTO: 32.5 % — SIGNIFICANT CHANGE UP (ref 13–44)
MCHC RBC-ENTMCNC: 29.2 PG — SIGNIFICANT CHANGE UP (ref 27–34)
MCHC RBC-ENTMCNC: 33.2 GM/DL — SIGNIFICANT CHANGE UP (ref 32–36)
MCV RBC AUTO: 88.1 FL — SIGNIFICANT CHANGE UP (ref 80–100)
MONOCYTES # BLD AUTO: 0.56 K/UL — SIGNIFICANT CHANGE UP (ref 0–0.9)
MONOCYTES NFR BLD AUTO: 6.6 % — SIGNIFICANT CHANGE UP (ref 2–14)
NEUTROPHILS # BLD AUTO: 4.64 K/UL — SIGNIFICANT CHANGE UP (ref 1.8–7.4)
NEUTROPHILS NFR BLD AUTO: 54.8 % — SIGNIFICANT CHANGE UP (ref 43–77)
PLATELET # BLD AUTO: 148 K/UL — LOW (ref 150–400)
POTASSIUM SERPL-MCNC: 4.5 MMOL/L — SIGNIFICANT CHANGE UP (ref 3.5–5.3)
POTASSIUM SERPL-SCNC: 4.5 MMOL/L — SIGNIFICANT CHANGE UP (ref 3.5–5.3)
PROTHROM AB SERPL-ACNC: 12.8 SEC — SIGNIFICANT CHANGE UP (ref 10–12.9)
RBC # BLD: 4.89 M/UL — SIGNIFICANT CHANGE UP (ref 4.2–5.8)
RBC # FLD: 13.3 % — SIGNIFICANT CHANGE UP (ref 10.3–14.5)
SODIUM SERPL-SCNC: 136 MMOL/L — SIGNIFICANT CHANGE UP (ref 135–145)
WBC # BLD: 8.47 K/UL — SIGNIFICANT CHANGE UP (ref 3.8–10.5)
WBC # FLD AUTO: 8.47 K/UL — SIGNIFICANT CHANGE UP (ref 3.8–10.5)

## 2020-01-29 PROCEDURE — 80048 BASIC METABOLIC PNL TOTAL CA: CPT

## 2020-01-29 PROCEDURE — 85730 THROMBOPLASTIN TIME PARTIAL: CPT

## 2020-01-29 PROCEDURE — 85610 PROTHROMBIN TIME: CPT

## 2020-01-29 PROCEDURE — G0463: CPT

## 2020-01-29 PROCEDURE — 93005 ELECTROCARDIOGRAM TRACING: CPT

## 2020-01-29 PROCEDURE — 93010 ELECTROCARDIOGRAM REPORT: CPT

## 2020-01-29 PROCEDURE — 85027 COMPLETE CBC AUTOMATED: CPT

## 2020-01-29 PROCEDURE — 36415 COLL VENOUS BLD VENIPUNCTURE: CPT

## 2020-01-29 RX ORDER — ZIPRASIDONE HYDROCHLORIDE 20 MG/1
60 CAPSULE ORAL
Qty: 0 | Refills: 0 | DISCHARGE

## 2020-01-29 NOTE — ASU PATIENT PROFILE, ADULT - PMH
Abnormal EKG    Aortic atherosclerosis    Bipolar 1 disorder    Bladder cancer  2009  Cataract    COPD (chronic obstructive pulmonary disease)    Emphysema of lung    History of syphilis    LBBB (left bundle branch block)    Lung cancer  2014  PAD (peripheral artery disease)    PVD (peripheral vascular disease)  stents X2  Sinus infection    Thyroid nodule

## 2020-01-29 NOTE — H&P PST ADULT - NSICDXPASTMEDICALHX_GEN_ALL_CORE_FT
PAST MEDICAL HISTORY:  Abnormal EKG     Aortic atherosclerosis     Bipolar 1 disorder     Bladder cancer 2009    Cataract     COPD (chronic obstructive pulmonary disease)     Emphysema of lung     History of ETOH abuse     History of syphilis     LBBB (left bundle branch block)     Lung cancer 2014    PAD (peripheral artery disease)     PVD (peripheral vascular disease) stents X2    Sinus infection     Thyroid nodule PAST MEDICAL HISTORY:  AAA (abdominal aortic aneurysm) with mural thrombus    Abnormal EKG     Aortic atherosclerosis     Bipolar 1 disorder     Bladder cancer 2009    Cataract     COPD (chronic obstructive pulmonary disease)     Emphysema of lung     History of ETOH abuse     History of syphilis     LBBB (left bundle branch block)     Lung cancer 2014    PAD (peripheral artery disease)     PVD (peripheral vascular disease) stents X2    Sinus infection     Thyroid nodule

## 2020-01-29 NOTE — H&P PST ADULT - ASSESSMENT
70 year old male with SOB and abnormal stress test and low EF for LHC to evaluate CMP.    ASA  Mallampati  GFR  Bleeding risk 70 year old male with SOB and abnormal stress test and low EF for LHC to evaluate CMP.    ASA 2-3  Mallampati 2  GFR  Bleeding risk 70 year old male with SOB and abnormal stress test and low EF for LHC to evaluate CMP.    ASA 2-3  Mallampati 2  GFR 69  Bleeding risk 1.3%

## 2020-01-29 NOTE — H&P PST ADULT - NSICDXPASTSURGICALHX_GEN_ALL_CORE_FT
PAST SURGICAL HISTORY:  History of AAA (abdominal aortic aneurysm) repair with stent and mural thrombus    History of femoral angiogram B/L stents    History of tonsillectomy     PVD (peripheral vascular disease) angiogram stents X2    S/P LASIK surgery PAST SURGICAL HISTORY:  History of femoral angiogram B/L stents    History of tonsillectomy     PVD (peripheral vascular disease) angiogram stents X2    S/P LASIK surgery

## 2020-01-29 NOTE — ASU PATIENT PROFILE, ADULT - AS SC BRADEN SENSORY
(4) no impairment
transfer from [facility] with abd and poss sbo by ct scan; transfer here due to md is here; had rlq pain since midnight (23 hours ago); also had vomiting of bile; pt has hx of chron's disease with multiple bowel resections; received 3 lilters of fluids and total of 6 mg dilaudid at [facility]; mom at bedside

## 2020-01-29 NOTE — ASU PATIENT PROFILE, ADULT - --DESCRIBE SURGICAL SITE
port-a-cath removed at [facility] jan 14 2020, derma bond and steri strips off, small openining on linear suture line  pink wound base, small yellow drainage, no redness, no swelling, pt to f/u with md on 1-30-20, dsd in place right anterior chest wall  port-a-cath removed at [facility] jan 14 2020, derma bond and steri strips off, small openining on linear suture line  pink wound base, small yellow drainage, no redness, no swelling, pt to f/u with md on 1-30-20, dsd in place

## 2020-01-29 NOTE — H&P PST ADULT - PRO TOBACCO QUIT READY
Pt says his MD (psych) says it would be a bad idea to take chantix..  Only smoking 2 cigs a day at present

## 2020-01-29 NOTE — ASU PATIENT PROFILE, ADULT - PSH
History of femoral angiogram  B/L stents  History of tonsillectomy    PVD (peripheral vascular disease)  angiogram stents X2  S/P LASIK surgery

## 2020-01-29 NOTE — H&P PST ADULT - NSICDXFAMILYHX_GEN_ALL_CORE_FT
FAMILY HISTORY:  Father  Still living? No  Family history of lung cancer, Age at diagnosis: 61-70    Sibling  Still living? Yes, Estimated age: Age Unknown  Family history of diabetes mellitus, Age at diagnosis: 51-60  Family history of other heart disease, Age at diagnosis: Age Unknown

## 2020-01-29 NOTE — H&P PST ADULT - HISTORY OF PRESENT ILLNESS
70 year old male with h/o tobacco dependence, lung cancer in remission with chemo, HLD, COPD, Bipolar , AAA with mural thrombus medically managed,PAD with illiac disease with stent in illac who c/o SOB with positive stress test and low EF.  Pt for [facility] to evaluate CMP     NST 1/2020: inferior/inferoseptal infarct with EF 22%  TTE 1/2020: severe LV dysfunction, EF 20-25%, severe inferior/inferolateral hypo, mild TR  LE arteral duplex: moderate plaque, no hemodynamically significant stenosis  EMILEE/PVR: R 0.86. L 0.82. PVR suggestive of inflow disease  Aortic scan: AAA with stent and mural thrombus 70 year old male with h/o tobacco dependence, lung cancer in remission with chemo, HLD, COPD, Bipolar , AAA with mural thrombus medically managed, PAD  (illiac disease) with stent in iliac who c/o STEVENSON with positive stress test and low EF.  Pt for [facility] to evaluate CMP     Risk factors include: smoking, PAD, chronic lung disease, low EF    NST 1/2020: inferior/inferoseptal infarct with EF 22%  TTE 1/2020: severe LV dysfunction, EF 20-25%, severe inferior/inferolateral hypo, mild TR  LE arteral duplex: moderate plaque, no hemodynamically significant stenosis  EMILEE/PVR: R 0.86. L 0.82. PVR suggestive of inflow disease  Aortic scan: AAA with stent and mural thrombus    Of note: Pt had R ACW port removed two weeks ago, site remains slightly open.  Pt instructed to follow up with Dr. Head.    Pt has appt with Maldonado tomorrow to assess AAA and iliac. 70 year old male with h/o tobacco dependence, lung cancer in remission with chemo, HLD, COPD, Bipolar , AAA with mural thrombus medically managed, PAD  (illiac disease) with stent in illac who c/o SOB with positive stress test and low EF.  Pt for [facility] to evaluate CMP     Risk Factors: smoking, PAD, chronic lung disease, low EF    NST 1/2020: inferior/inferoseptal infarct with EF 22%  TTE 1/2020: severe LV dysfunction, EF 20-25%, severe inferior/inferolateral hypo, mild TR  LE arteral duplex: moderate plaque, no hemodynamically significant stenosis  EMILEE/PVR: R 0.86. L 0.82. PVR suggestive of inflow disease  Aortic scan: AAA with stent and mural thrombus    Of note: R ACW PICC line was removed two weeks ago.  Site not apporximated instructed to f/u with Oncologist if s/s of infection  Has appt with Dr. Wall this week to assess AAA and iliac disease

## 2020-01-31 ENCOUNTER — APPOINTMENT (OUTPATIENT)
Dept: VASCULAR SURGERY | Facility: CLINIC | Age: 70
End: 2020-01-31
Payer: COMMERCIAL

## 2020-01-31 ENCOUNTER — APPOINTMENT (OUTPATIENT)
Dept: VASCULAR SURGERY | Facility: CLINIC | Age: 70
End: 2020-01-31

## 2020-01-31 PROBLEM — I71.4 ABDOMINAL AORTIC ANEURYSM, WITHOUT RUPTURE: Chronic | Status: ACTIVE | Noted: 2020-01-29

## 2020-01-31 PROBLEM — J44.9 CHRONIC OBSTRUCTIVE PULMONARY DISEASE, UNSPECIFIED: Chronic | Status: ACTIVE | Noted: 2020-01-29

## 2020-01-31 PROBLEM — Z86.19 PERSONAL HISTORY OF OTHER INFECTIOUS AND PARASITIC DISEASES: Chronic | Status: ACTIVE | Noted: 2020-01-29

## 2020-01-31 PROCEDURE — 99203 OFFICE O/P NEW LOW 30 MIN: CPT

## 2020-02-03 ENCOUNTER — TRANSCRIPTION ENCOUNTER (OUTPATIENT)
Age: 70
End: 2020-02-03

## 2020-02-03 ENCOUNTER — INPATIENT (INPATIENT)
Facility: HOSPITAL | Age: 70
LOS: 0 days | Discharge: HOME | DRG: 246 | End: 2020-02-04
Attending: INTERNAL MEDICINE | Admitting: INTERNAL MEDICINE
Payer: COMMERCIAL

## 2020-02-03 VITALS
OXYGEN SATURATION: 97 % | SYSTOLIC BLOOD PRESSURE: 123 MMHG | DIASTOLIC BLOOD PRESSURE: 70 MMHG | HEART RATE: 85 BPM | RESPIRATION RATE: 16 BRPM | TEMPERATURE: 98 F

## 2020-02-03 DIAGNOSIS — I42.9 CARDIOMYOPATHY, UNSPECIFIED: ICD-10-CM

## 2020-02-03 DIAGNOSIS — Z98.890 OTHER SPECIFIED POSTPROCEDURAL STATES: Chronic | ICD-10-CM

## 2020-02-03 DIAGNOSIS — Z86.79 PERSONAL HISTORY OF OTHER DISEASES OF THE CIRCULATORY SYSTEM: Chronic | ICD-10-CM

## 2020-02-03 DIAGNOSIS — Z90.89 ACQUIRED ABSENCE OF OTHER ORGANS: Chronic | ICD-10-CM

## 2020-02-03 DIAGNOSIS — I73.9 PERIPHERAL VASCULAR DISEASE, UNSPECIFIED: Chronic | ICD-10-CM

## 2020-02-03 DIAGNOSIS — I25.10 ATHEROSCLEROTIC HEART DISEASE OF NATIVE CORONARY ARTERY WITHOUT ANGINA PECTORIS: ICD-10-CM

## 2020-02-03 PROCEDURE — 92928 PRQ TCAT PLMT NTRAC ST 1 LES: CPT | Mod: LD

## 2020-02-03 PROCEDURE — 93458 L HRT ARTERY/VENTRICLE ANGIO: CPT | Mod: 26,XU

## 2020-02-03 PROCEDURE — 92978 ENDOLUMINL IVUS OCT C 1ST: CPT | Mod: 26,LD

## 2020-02-03 PROCEDURE — 99152 MOD SED SAME PHYS/QHP 5/>YRS: CPT

## 2020-02-03 PROCEDURE — 93571 IV DOP VEL&/PRESS C FLO 1ST: CPT | Mod: 26,LD

## 2020-02-03 RX ORDER — CLONAZEPAM 1 MG
2 TABLET ORAL AT BEDTIME
Refills: 0 | Status: DISCONTINUED | OUTPATIENT
Start: 2020-02-03 | End: 2020-02-04

## 2020-02-03 RX ORDER — ASPIRIN/CALCIUM CARB/MAGNESIUM 324 MG
81 TABLET ORAL DAILY
Refills: 0 | Status: DISCONTINUED | OUTPATIENT
Start: 2020-02-03 | End: 2020-02-04

## 2020-02-03 RX ORDER — ONDANSETRON 8 MG/1
4 TABLET, FILM COATED ORAL ONCE
Refills: 0 | Status: COMPLETED | OUTPATIENT
Start: 2020-02-03 | End: 2020-02-03

## 2020-02-03 RX ORDER — CARVEDILOL PHOSPHATE 80 MG/1
6.25 CAPSULE, EXTENDED RELEASE ORAL EVERY 12 HOURS
Refills: 0 | Status: DISCONTINUED | OUTPATIENT
Start: 2020-02-03 | End: 2020-02-04

## 2020-02-03 RX ORDER — CLOPIDOGREL BISULFATE 75 MG/1
75 TABLET, FILM COATED ORAL DAILY
Refills: 0 | Status: DISCONTINUED | OUTPATIENT
Start: 2020-02-04 | End: 2020-02-04

## 2020-02-03 RX ORDER — PANTOPRAZOLE SODIUM 20 MG/1
40 TABLET, DELAYED RELEASE ORAL
Refills: 0 | Status: DISCONTINUED | OUTPATIENT
Start: 2020-02-03 | End: 2020-02-04

## 2020-02-03 RX ORDER — SACUBITRIL AND VALSARTAN 24; 26 MG/1; MG/1
1 TABLET, FILM COATED ORAL
Refills: 0 | Status: DISCONTINUED | OUTPATIENT
Start: 2020-02-03 | End: 2020-02-04

## 2020-02-03 RX ORDER — FLUPHENAZINE HYDROCHLORIDE 1 MG/1
2.5 TABLET, FILM COATED ORAL
Refills: 0 | Status: DISCONTINUED | OUTPATIENT
Start: 2020-02-03 | End: 2020-02-04

## 2020-02-03 RX ORDER — ATORVASTATIN CALCIUM 80 MG/1
40 TABLET, FILM COATED ORAL AT BEDTIME
Refills: 0 | Status: DISCONTINUED | OUTPATIENT
Start: 2020-02-03 | End: 2020-02-04

## 2020-02-03 RX ORDER — CLOPIDOGREL BISULFATE 75 MG/1
600 TABLET, FILM COATED ORAL ONCE
Refills: 0 | Status: COMPLETED | OUTPATIENT
Start: 2020-02-03 | End: 2020-02-03

## 2020-02-03 RX ADMIN — FLUPHENAZINE HYDROCHLORIDE 2.5 MILLIGRAM(S): 1 TABLET, FILM COATED ORAL at 18:10

## 2020-02-03 RX ADMIN — SACUBITRIL AND VALSARTAN 1 TABLET(S): 24; 26 TABLET, FILM COATED ORAL at 18:10

## 2020-02-03 RX ADMIN — CARVEDILOL PHOSPHATE 6.25 MILLIGRAM(S): 80 CAPSULE, EXTENDED RELEASE ORAL at 18:10

## 2020-02-03 RX ADMIN — Medication 2 MILLIGRAM(S): at 21:53

## 2020-02-03 RX ADMIN — ONDANSETRON 4 MILLIGRAM(S): 8 TABLET, FILM COATED ORAL at 21:53

## 2020-02-03 RX ADMIN — CLOPIDOGREL BISULFATE 600 MILLIGRAM(S): 75 TABLET, FILM COATED ORAL at 21:53

## 2020-02-03 RX ADMIN — ATORVASTATIN CALCIUM 40 MILLIGRAM(S): 80 TABLET, FILM COATED ORAL at 21:53

## 2020-02-03 RX ADMIN — PANTOPRAZOLE SODIUM 40 MILLIGRAM(S): 20 TABLET, DELAYED RELEASE ORAL at 18:10

## 2020-02-03 NOTE — DISCHARGE NOTE PROVIDER - HOSPITAL COURSE
HPI: 70 year old male with h/o tobacco dependence, lung cancer in remission with chemo, HLD, COPD, Bipolar , AAA with mural thrombus medically managed, PAD  (illiac disease) with stent in illac who c/o SOB with positive stress test and low EF.  Pt for [facility] to evaluate CMP         Risk Factors: smoking, PAD, chronic lung disease, low EF        NST 1/2020: inferior/inferoseptal infarct with EF 22%    TTE 1/2020: severe LV dysfunction, EF 20-25%, severe inferior/inferolateral hypo, mild TR    LE arteral duplex: moderate plaque, no hemodynamically significant stenosis    EMILEE/PVR: R 0.86. L 0.82. PVR suggestive of inflow disease    Aortic scan: AAA with stent and mural thrombus        Of note: R ACW PICC line was removed two weeks ago.  Site not apporximated instructed to f/u with Oncologist if s/s of infection    Has appt with Dr. Wall this week to assess AAA and iliac disease        now s/p [facility] with succesful PCI and SHANTHI to pLAD        ASSESSMENT/PLAN:      Coronary artery disease    -Admitted to telemetry    -Aspirin 81 mg PO daily    -Plavix 600 mg po 2/3/2020 at 2200 to transition to Plavix(Brilinta 180 mg po given in lab)    -Plavix 75mg PO daily starting 2/4/2020    -Carvedilol 6.25 mg PO twice daily    -Entresto 24mmg/26mg PO twice daily    -atorvastatin 40mg PO QHS     -Plan of care discussed with patient, family and MD    -Follow-up with attending MD Dr Benito within 1 week post discharge    - cardiac rehab info provided/referral and communication to cardiac rehab completed    -Discussed therapeutic lifestyle changes to reduce risk factors such as following a cardiac diet, weight loss, maintaining a healthy weight, exercise, smoking cessation, medication compliance, and regular follow-up  with MD to know your numbers (BP, cholesterol, weight, and glucose) Assessment and Plan:     · Assessment        70 year old male with h/o tobacco dependence, lung cancer in remission with chemo, HLD, COPD, Bipolar , AAA with mural thrombus medically managed, PAD  (illiac disease) with stent in illac who c/o SOB with positive stress test and low EF.  Pt for [facility] to evaluate CMP. Now s/p LHC w/ successful PCI x 1 SHANTHI to LAD.         Problem/Plan - 1:    ·  Problem: Coronary artery disease.  Plan: x1 SHANTHI to LAD     continue ASA, PLavix, lipitor, coreg, entresto    Diet/lifestyle modifications and medication compliance heavily reinforced     Stable for DC from cardiology standpoint    Pt with stable vital signs, telemetry stable, physical exam unremarkable and unchanged from pre-procedural baseline, neurovascularly intact, ambulating, eating, review of systems completely negative. pt verbalized an understanding of the discharge teaching. Patient to follow up with Dr. Benito.         Problem/Plan - 2:    ·  Problem: S/P coronary angiogram.  Plan: Patient educated about wrist site care, signs and symptoms of complication post procedure, and plan of care moving forward. Patient verbalized understanding with teach-back.     cardiac rehab info provided/referral and communication to cardiac rehab completed.

## 2020-02-03 NOTE — DISCHARGE NOTE NURSING/CASE MANAGEMENT/SOCIAL WORK - PATIENT PORTAL LINK FT
You can access the FollowMyHealth Patient Portal offered by [facility] by registering at the following website: [email or web address]. By joining [facility]’s FollowMyHealth portal, you will also be able to view your health information using other applications (apps) compatible with our system.

## 2020-02-03 NOTE — DISCHARGE NOTE PROVIDER - NSDCCPTREATMENT_GEN_ALL_CORE_FT
PRINCIPAL PROCEDURE  Procedure: Left heart cardiac catheterization  Findings and Treatment: s/p LHC x 1 SHANTHI to LAD

## 2020-02-03 NOTE — DISCHARGE NOTE PROVIDER - NSDCMRMEDTOKEN_GEN_ALL_CORE_FT
aspirin 81 mg oral tablet: 1 tab(s) orally once a day  atorvastatin 10 mg oral tablet: 1 tab(s) orally once a day  carvedilol 6.25 mg oral tablet: 1 tab(s) orally 2 times a day  clonazePAM: 2 milligram(s) orally once a day (at bedtime)  Entresto 24 mg-26 mg oral tablet: 1 tab(s) orally 2 times a day  fluPHENAZine 2.5 mg oral tablet: 1 tab(s) orally 2 times a day  pantoprazole 40 mg oral delayed release tablet: 1 tab(s) orally once a day aspirin 81 mg oral tablet: 1 tab(s) orally once a day  atorvastatin 10 mg oral tablet: 1 tab(s) orally once a day  carvedilol 6.25 mg oral tablet: 1 tab(s) orally 2 times a day  clonazePAM: 2 milligram(s) orally once a day (at bedtime)  clopidogrel 75 mg oral tablet: 1 tab(s) orally once a day  Entresto 24 mg-26 mg oral tablet: 1 tab(s) orally 2 times a day  fluPHENAZine 2.5 mg oral tablet: 1 tab(s) orally 2 times a day  pantoprazole 40 mg oral delayed release tablet: 1 tab(s) orally once a day

## 2020-02-03 NOTE — PROGRESS NOTE ADULT - SUBJECTIVE AND OBJECTIVE BOX
Nurse Practitioner Progress note:   s/p LHC with successful PCI and SHANTHI to pLAD - Jaime 3.5mm x 15mm      Patient feels well.  Denies chest pain, shortness of breath, dizziness or palpitations at this time    Pt A&O x 3  Lungs CTA  S1S2  Telemetry showing Sinus rhythm  12 lead EKG:  Sinus Rhythm with LBBB possible old AWMI and one PVC  Right radial hemoband in place.  Procedure site CDI, no bleeding, no hematoma, able to move all digits with capillary refill < 3 seconds, fingers warm      T(C): 36.6 (02-03-20 @ 07:28), Max: 36.6 (02-03-20 @ 07:28)  HR: 77 (02-03-20 @ 09:15) (77 - 86)  BP: 100/61 (02-03-20 @ 09:15) (100/61 - 125/61)  RR: 16 (02-03-20 @ 09:15) (16 - 16)  SpO2: 98% (02-03-20 @ 09:15) (97% - 99%)          MEDICATIONS  (STANDING):  aspirin  chewable 81 milliGRAM(s) Oral daily  atorvastatin 40 milliGRAM(s) Oral at bedtime  carvedilol 6.25 milliGRAM(s) Oral every 12 hours  clonazePAM  Tablet 2 milliGRAM(s) Oral at bedtime  fluPHENAZine 2.5 milliGRAM(s) Oral two times a day  pantoprazole    Tablet 40 milliGRAM(s) Oral before breakfast  sacubitril 24 mG/valsartan 26 mG 1 Tablet(s) Oral two times a day        HPI: 70 year old male with h/o tobacco dependence, lung cancer in remission with chemo, HLD, COPD, Bipolar , AAA with mural thrombus medically managed, PAD  (illiac disease) with stent in illac who c/o SOB with positive stress test and low EF.  Pt for [facility] to evaluate CMP     Risk Factors: smoking, PAD, chronic lung disease, low EF    NST 1/2020: inferior/inferoseptal infarct with EF 22%  TTE 1/2020: severe LV dysfunction, EF 20-25%, severe inferior/inferolateral hypo, mild TR  LE arteral duplex: moderate plaque, no hemodynamically significant stenosis  EMILEE/PVR: R 0.86. L 0.82. PVR suggestive of inflow disease  Aortic scan: AAA with stent and mural thrombus    Of note: R ACW PICC line was removed two weeks ago.  Site not apporximated instructed to f/u with Oncologist if s/s of infection  Has appt with Dr. Wall this week to assess AAA and iliac disease    now s/p [facility] with succesful PCI and SHANTHI to pLAD    ASSESSMENT/PLAN:    Coronary artery disease  -Admit to telemetry  -VS, labs, diet, activity as per PCI orders  -IV hydration  -Encourage PO fluids  -Aspirin 81 mg PO daily  -Plavix 600 mg po tonight at 2200 to transition to Plavix(Brilinta 180 mg po given in lab)  -Plavix 75mg PO daily  -Carvedilol 6.25 mg PO twice daily  -Entresto 24mmg/26mg PO twice daily  -atorvastatin 40mg PO QHS   -Plan of care discussed with patient, family and MD  -likely d/c in AM if patient remains stable overnight  -NP to see in AM to evaluate labs, EKG and procedure site check  -Follow-up with attending MD Dr Benito within 1 week post discharge  - cardiac rehab info provided/referral and communication to cardiac rehab completed  -Discussed therapeutic lifestyle changes to reduce risk factors such as following a cardiac diet, weight loss, maintaining a healthy weight, exercise, smoking cessation, medication compliance, and regular follow-up  with MD to know your numbers (BP, cholesterol, weight, and glucose)

## 2020-02-03 NOTE — PROGRESS NOTE ADULT - SUBJECTIVE AND OBJECTIVE BOX
This thin 70 year old gentleman presenting today for elective LHC secondary to positive stress test feels well-denies any c/o CP, SOB, or palpitations, and is in no distress.  Denies any fever/ chills,    PRE-PROCEDURE ASSESSMENT    -Patient seen and examined  -Took Baby ASA this am ( can't recall the others)  -Labs/ EKG  reviewed-SR with LBBB HR 82/ VSS  -Pre-procedure teaching completed with patient and family  -Informed consent / Dr Carter  -Questions answered  ASA 2-3  MALL-2  GFR-69  BRA-1.3%

## 2020-02-03 NOTE — DISCHARGE NOTE PROVIDER - NSDCCPCAREPLAN_GEN_ALL_CORE_FT
PRINCIPAL DISCHARGE DIAGNOSIS  Diagnosis: Coronary artery disease  Assessment and Plan of Treatment: Coronary artery disease is a condition where the arteries the supply the heart muscle get clogged with fatty deposits & puts you at risk for a heart attack  Call your doctor if you have any new pain, pressure, or discomfort in the center of your chest, pain, tingling or discomfort in arms, back, neck, jaw, or stomach, shortness of breath, nausea, vomiting, burping or heartburn, sweating, cold and clammy skin, racing or abnormal heartbeat for more than 10 minutes or if they keep coming & going.  Call 911 and do not tr to get to hospital by care  You can help yourself with lefestyle changes (quitting smoking if you smoke), eat lots of fruits & vegetables & low fat dairy products, not a lot of meat & fatty foods, walk or some form of physical activity most days of the week, lose weight if you are overweight  Take your cardiac medication as prescribed to lower cholesterol, to lower blood pressure, aspirin to prevent blood clots, and diabetes control  Make sure to keep appointments with doctor for cardiac follow up care

## 2020-02-03 NOTE — DISCHARGE NOTE PROVIDER - NSDCFUSCHEDAPPT_GEN_ALL_CORE_FT
RUSS BUCHANAN ; 02/12/2020 ; [facility] [address] [facility] RUSS BUCHANAN ; 02/12/2020 ; [facility] [address] [facility]

## 2020-02-03 NOTE — DISCHARGE NOTE PROVIDER - INSTRUCTIONS
Mediterranean Diet Choose lean meats and poultry without skin and prepare them without added saturated and trans fat.  Eat fish at least twice a week. Recent research shows that eating oily fish containing omega-3 fatty acids (for example, salmon, trout and herring) may help lower your risk of death from coronary artery disease.  Select fat-free, 1 percent fat and low-fat dairy products.  Cut back on foods containing partially hydrogenated vegetable oils to reduce trans fat in your diet.   To lower cholesterol, reduce saturated fat to no more than 5 to 6 percent of total calories. For someone eating 2,000 calories a day, that’s about 13 grams of saturated fat.  Cut back on beverages and foods with added sugars.  Choose and prepare foods with little or no salt. To lower blood pressure, aim to eat no more than 2,400 milligrams of sodium per day. Reducing daily intake to 1,500 mg is desirable because it can lower blood pressure even further.

## 2020-02-03 NOTE — DISCHARGE NOTE PROVIDER - CARE PROVIDER_API CALL
Jose Benito)  Cardiovascular Disease  [address] [place] [address]  Phone: [phone]  Fax: [phone]  Follow Up Time: 1 week

## 2020-02-03 NOTE — DISCHARGE NOTE PROVIDER - NSDCACTIVITY_GEN_ALL_CORE
Showering allowed/No heavy lifting/straining/Walking - Indoors allowed/Do not make important decisions/Choose lean meats and poultry without skin and prepare them without added saturated and trans fat.  Eat fish at least twice a week. Recent research shows that eating oily fish containing omega-3 fatty acids (for example, salmon, trout and herring) may help lower your risk of death from coronary artery disease.  Select fat-free, 1 percent fat and low-fat dairy products.  Cut back on foods containing partially hydrogenated vegetable oils to reduce trans fat in your diet.   To lower cholesterol, reduce saturated fat to no more than 5 to 6 percent of total calories. For someone eating 2,000 calories a day, that’s about 13 grams of saturated fat.  Cut back on beverages and foods with added sugars.  Choose and prepare foods with little or no salt. To lower blood pressure, aim to eat no more than 2,400 milligrams of sodium per day. Reducing daily intake to 1,500 mg is desirable because it can lower blood pressure even further.  If you drink alcohol, drink in moderation. That means one drink per day if you’re a woman and two drinks  per day if you’re a man.  Follow the American Heart Association recommendations when you eat out, and keep an eye on your portion sizes./Do not drive or operate machinery

## 2020-02-03 NOTE — DISCHARGE NOTE NURSING/CASE MANAGEMENT/SOCIAL WORK - NSDCPEPTSTRK_GEN_ALL_CORE
Risk factors for stroke/Stroke warning signs and symptoms/Signs and symptoms of stroke/Stroke education booklet/Call 911 for stroke/Prescribed medications/Stroke support groups for patients, families, and friends/Need for follow up after discharge

## 2020-02-04 VITALS
RESPIRATION RATE: 18 BRPM | OXYGEN SATURATION: 95 % | TEMPERATURE: 98 F | SYSTOLIC BLOOD PRESSURE: 107 MMHG | DIASTOLIC BLOOD PRESSURE: 71 MMHG | HEART RATE: 79 BPM

## 2020-02-04 DIAGNOSIS — Z98.890 OTHER SPECIFIED POSTPROCEDURAL STATES: ICD-10-CM

## 2020-02-04 LAB
ALBUMIN SERPL ELPH-MCNC: 3.6 G/DL — SIGNIFICANT CHANGE UP (ref 3.3–5.2)
ALP SERPL-CCNC: 112 U/L — SIGNIFICANT CHANGE UP (ref 40–120)
ALT FLD-CCNC: 10 U/L — SIGNIFICANT CHANGE UP
ANION GAP SERPL CALC-SCNC: 13 MMOL/L — SIGNIFICANT CHANGE UP (ref 5–17)
AST SERPL-CCNC: 18 U/L — SIGNIFICANT CHANGE UP
BASOPHILS # BLD AUTO: 0.05 K/UL — SIGNIFICANT CHANGE UP (ref 0–0.2)
BASOPHILS NFR BLD AUTO: 0.6 % — SIGNIFICANT CHANGE UP (ref 0–2)
BILIRUB SERPL-MCNC: 0.7 MG/DL — SIGNIFICANT CHANGE UP (ref 0.4–2)
BUN SERPL-MCNC: 20 MG/DL — SIGNIFICANT CHANGE UP (ref 8–20)
CALCIUM SERPL-MCNC: 9 MG/DL — SIGNIFICANT CHANGE UP (ref 8.6–10.2)
CHLORIDE SERPL-SCNC: 105 MMOL/L — SIGNIFICANT CHANGE UP (ref 98–107)
CO2 SERPL-SCNC: 21 MMOL/L — LOW (ref 22–29)
CREAT SERPL-MCNC: 1.22 MG/DL — SIGNIFICANT CHANGE UP (ref 0.5–1.3)
EOSINOPHIL # BLD AUTO: 0.48 K/UL — SIGNIFICANT CHANGE UP (ref 0–0.5)
EOSINOPHIL NFR BLD AUTO: 5.9 % — SIGNIFICANT CHANGE UP (ref 0–6)
GLUCOSE SERPL-MCNC: 84 MG/DL — SIGNIFICANT CHANGE UP (ref 70–99)
HCT VFR BLD CALC: 39.2 % — SIGNIFICANT CHANGE UP (ref 39–50)
HGB BLD-MCNC: 13.2 G/DL — SIGNIFICANT CHANGE UP (ref 13–17)
IMM GRANULOCYTES NFR BLD AUTO: 0.4 % — SIGNIFICANT CHANGE UP (ref 0–1.5)
LYMPHOCYTES # BLD AUTO: 2 K/UL — SIGNIFICANT CHANGE UP (ref 1–3.3)
LYMPHOCYTES # BLD AUTO: 24.7 % — SIGNIFICANT CHANGE UP (ref 13–44)
MAGNESIUM SERPL-MCNC: 1.9 MG/DL — SIGNIFICANT CHANGE UP (ref 1.6–2.6)
MCHC RBC-ENTMCNC: 29.5 PG — SIGNIFICANT CHANGE UP (ref 27–34)
MCHC RBC-ENTMCNC: 33.7 GM/DL — SIGNIFICANT CHANGE UP (ref 32–36)
MCV RBC AUTO: 87.7 FL — SIGNIFICANT CHANGE UP (ref 80–100)
MONOCYTES # BLD AUTO: 0.59 K/UL — SIGNIFICANT CHANGE UP (ref 0–0.9)
MONOCYTES NFR BLD AUTO: 7.3 % — SIGNIFICANT CHANGE UP (ref 2–14)
NEUTROPHILS # BLD AUTO: 4.95 K/UL — SIGNIFICANT CHANGE UP (ref 1.8–7.4)
NEUTROPHILS NFR BLD AUTO: 61.1 % — SIGNIFICANT CHANGE UP (ref 43–77)
PLATELET # BLD AUTO: 123 K/UL — LOW (ref 150–400)
POTASSIUM SERPL-MCNC: 4.2 MMOL/L — SIGNIFICANT CHANGE UP (ref 3.5–5.3)
POTASSIUM SERPL-SCNC: 4.2 MMOL/L — SIGNIFICANT CHANGE UP (ref 3.5–5.3)
PROT SERPL-MCNC: 6.6 G/DL — SIGNIFICANT CHANGE UP (ref 6.6–8.7)
RBC # BLD: 4.47 M/UL — SIGNIFICANT CHANGE UP (ref 4.2–5.8)
RBC # FLD: 13.3 % — SIGNIFICANT CHANGE UP (ref 10.3–14.5)
SODIUM SERPL-SCNC: 139 MMOL/L — SIGNIFICANT CHANGE UP (ref 135–145)
WBC # BLD: 8.1 K/UL — SIGNIFICANT CHANGE UP (ref 3.8–10.5)
WBC # FLD AUTO: 8.1 K/UL — SIGNIFICANT CHANGE UP (ref 3.8–10.5)

## 2020-02-04 PROCEDURE — 93005 ELECTROCARDIOGRAM TRACING: CPT

## 2020-02-04 PROCEDURE — C1725: CPT

## 2020-02-04 PROCEDURE — 93010 ELECTROCARDIOGRAM REPORT: CPT

## 2020-02-04 PROCEDURE — 93458 L HRT ARTERY/VENTRICLE ANGIO: CPT

## 2020-02-04 PROCEDURE — C1874: CPT

## 2020-02-04 PROCEDURE — 93571 IV DOP VEL&/PRESS C FLO 1ST: CPT | Mod: LD

## 2020-02-04 PROCEDURE — 99153 MOD SED SAME PHYS/QHP EA: CPT

## 2020-02-04 PROCEDURE — C1753: CPT

## 2020-02-04 PROCEDURE — C1769: CPT

## 2020-02-04 PROCEDURE — 99152 MOD SED SAME PHYS/QHP 5/>YRS: CPT

## 2020-02-04 PROCEDURE — C1887: CPT

## 2020-02-04 PROCEDURE — 80053 COMPREHEN METABOLIC PANEL: CPT

## 2020-02-04 PROCEDURE — C1894: CPT

## 2020-02-04 PROCEDURE — 92978 ENDOLUMINL IVUS OCT C 1ST: CPT | Mod: LD

## 2020-02-04 PROCEDURE — 85027 COMPLETE CBC AUTOMATED: CPT

## 2020-02-04 PROCEDURE — C9600: CPT

## 2020-02-04 PROCEDURE — 83735 ASSAY OF MAGNESIUM: CPT

## 2020-02-04 RX ORDER — ASPIRIN/CALCIUM CARB/MAGNESIUM 324 MG
1 TABLET ORAL
Qty: 0 | Refills: 0 | DISCHARGE

## 2020-02-04 RX ORDER — ASPIRIN/CALCIUM CARB/MAGNESIUM 324 MG
1 TABLET ORAL
Qty: 90 | Refills: 3
Start: 2020-02-04 | End: 2021-01-28

## 2020-02-04 RX ORDER — CLOPIDOGREL BISULFATE 75 MG/1
1 TABLET, FILM COATED ORAL
Qty: 90 | Refills: 3
Start: 2020-02-04 | End: 2021-01-28

## 2020-02-04 RX ADMIN — PANTOPRAZOLE SODIUM 40 MILLIGRAM(S): 20 TABLET, DELAYED RELEASE ORAL at 09:20

## 2020-02-04 RX ADMIN — SACUBITRIL AND VALSARTAN 1 TABLET(S): 24; 26 TABLET, FILM COATED ORAL at 05:54

## 2020-02-04 RX ADMIN — Medication 81 MILLIGRAM(S): at 09:20

## 2020-02-04 RX ADMIN — CLOPIDOGREL BISULFATE 75 MILLIGRAM(S): 75 TABLET, FILM COATED ORAL at 09:20

## 2020-02-04 RX ADMIN — CARVEDILOL PHOSPHATE 6.25 MILLIGRAM(S): 80 CAPSULE, EXTENDED RELEASE ORAL at 05:55

## 2020-02-04 RX ADMIN — FLUPHENAZINE HYDROCHLORIDE 2.5 MILLIGRAM(S): 1 TABLET, FILM COATED ORAL at 05:54

## 2020-02-04 NOTE — CHART NOTE - NSCHARTNOTEFT_GEN_A_CORE
AIVR 8 beats on telemetry   will check magnesium  otherwise patient asymptomatic and stable for DC  DC telemetry

## 2020-02-04 NOTE — PROGRESS NOTE ADULT - PROBLEM SELECTOR PLAN 2
Patient educated about wrist site care, signs and symptoms of complication post procedure, and plan of care moving forward. Patient verbalized understanding with teach-back.   cardiac rehab info provided/referral and communication to cardiac rehab completed

## 2020-02-04 NOTE — PROGRESS NOTE ADULT - PROBLEM SELECTOR PLAN 1
x1 SHANTHI to LAD   continue ASA, PLavix, lipitor, coreg, entresto  Diet/lifestyle modifications and medication compliance heavily reinforced   Stable for DC from cardiology standpoint  Pt with stable vital signs, telemetry stable, physical exam unremarkable and unchanged from pre-procedural baseline, neurovascularly intact, ambulating, eating, review of systems completely negative. pt verbalized an understanding of the discharge teaching. Patient to follow up with Dr. Benito

## 2020-02-04 NOTE — PROGRESS NOTE ADULT - ASSESSMENT
70 year old male with h/o tobacco dependence, lung cancer in remission with chemo, HLD, COPD, Bipolar , AAA with mural thrombus medically managed, PAD  (illiac disease) with stent in illac who c/o SOB with positive stress test and low EF.  Pt for [facility] to evaluate CMP. Now s/p LHC w/ successful PCI x 1 SHANTHI to LAD.

## 2020-02-04 NOTE — PROGRESS NOTE ADULT - SUBJECTIVE AND OBJECTIVE BOX
[facility]                          [address] [place] [address]                       Phone: [phone]. Fax:[phone]                      ________________________________________________           Reason for follow up/Overnight events: post cath     HPI:  70 year old male with h/o tobacco dependence, lung cancer in remission with chemo, HLD, COPD, Bipolar , AAA with mural thrombus medically managed, PAD  (illiac disease) with stent in illac who c/o SOB with positive stress test and low EF.  Pt for [facility] to evaluate CMP     Risk Factors: smoking, PAD, chronic lung disease, low EF    NST 2020: inferior/inferoseptal infarct with EF 22%  TTE 2020: severe LV dysfunction, EF 20-25%, severe inferior/inferolateral hypo, mild TR  LE arteral duplex: moderate plaque, no hemodynamically significant stenosis  EMILEE/PVR: R 0.86. L 0.82. PVR suggestive of inflow disease  Aortic scan: AAA with stent and mural thrombus    Of note: R ACW PICC line was removed two weeks ago.  Site not approximated instructed to f/u with Oncologist if s/s of infection  Has appt with Dr. Wall this week to assess AAA and iliac disease    ROS: All review of systems negative unless indicated otherwise below.                          LAB RESULTS                   COMPLETE BLOOD COUNT( 2020 05:48 )                            13.2 g/dL  8.10 K/uL )---------------( 123 K/uL<L>                        39.2 %      Automated Differential     Auto Basophil # - 0.05 K/uL  Auto Basophil % - 0.6 %  Auto Eosinophil # - 0.48 K/uL  Auto Eosinophil % - 5.9 %  Auto Immature Granulocyte # - X      Auto Immature Granulocyte % - 0.4 %  Auto Lymphocyte # - 2.00 K/uL  Auto Lymphocyte % - 24.7 %  Auto Monocyte # - 0.59 K/uL  Auto Monocyte % - 7.3 %  Auto Neutrophil # - 4.95 K/uL  Auto Neutrophil % - 61.1 %                                  CHEMISTRY                 Basic Metabolic Panel (20 @ 05:48)    139  |  105  |  20.0  ----------------------------<  84  4.2   |  21.0<L>  |  1.22    Ca    9.0      2020 05:48                    Liver Functions (20 @ 05:48))  TPro  6.6  /  Alb  3.6  /  TBili  0.7  /  DBili  x   /  AST  18  /  ALT  10  /  AlkPhos  112     PT/INR/PTT ( 2020 11:01 )                        :                       :      12.8         :       47.5                  .        .                   .              .           .       1.11        .                                                               CARDIOLOGY RESULTS: Official Report/Preliminary Verbal Reports    CATH:   now s/p LHC with succesful PCI and SHANTHI to pLAD                            CARDIOLOGY REVIEW: Personally visualized and reviewed    EKG: NSR LBBB w/ PVC  Telemetry Last 24h: NSR 70-90 PAC, PAC in bigeminy                              DAILY WEIGHTS - 48 HOUR TREND     Daily Weight in k.4 (2020 04:39)                             INTAKE AND OUTPUT - 48 HOUR TREND       HOME MEDICATIONS:  aspirin 81 mg oral tablet: 1 tab(s) orally once a day (2020 07:32)  atorvastatin 10 mg oral tablet: 1 tab(s) orally once a day (2020 07:32)  carvedilol 6.25 mg oral tablet: 1 tab(s) orally 2 times a day (2020 07:32)  clonazePAM: 2 milligram(s) orally once a day (at bedtime) (2020 07:32)  Entresto 24 mg-26 mg oral tablet: 1 tab(s) orally 2 times a day (2020 07:32)  fluPHENAZine 2.5 mg oral tablet: 1 tab(s) orally 2 times a day (2020 07:32)  pantoprazole 40 mg oral delayed release tablet: 1 tab(s) orally once a day (2020 07:32)                             Current Admission Active Medications    aspirin  chewable 81 milliGRAM(s) Oral daily  atorvastatin 40 milliGRAM(s) Oral at bedtime  carvedilol 6.25 milliGRAM(s) Oral every 12 hours  clonazePAM  Tablet 2 milliGRAM(s) Oral at bedtime  clopidogrel Tablet 75 milliGRAM(s) Oral daily  fluPHENAZine 2.5 milliGRAM(s) Oral two times a day  pantoprazole    Tablet 40 milliGRAM(s) Oral before breakfast  sacubitril 24 mG/valsartan 26 mG 1 Tablet(s) Oral two times a day                        PHYSICAL EXAM:  Vital Signs Last 24 Hrs  T(C): 36.4 (2020 05:49), Max: 36.7 (2020 20:48)  T(F): 97.6 (2020 05:49), Max: 98.1 (2020 20:48)  HR: 79 (2020 05:49) (76 - 93)  BP: 114/68 (2020 05:49) (100/61 - 148/83)  BP(mean): --  RR: 18 (2020 05:49) (16 - 20)  SpO2: 94% (2020 05:49) (94% - 100%)    GENERAL: NAD  NECK: Supple, No JVD  NERVOUS SYSTEM:  Alert & Oriented X3, non focal neuro exam.   CHEST/LUNG: clear lungs, No rales, rhonchi, wheezing, or rubs  HEART: Regular rate and rhythm; s1 and s2 auscultated, No murmurs, rubs, or gallops  ABDOMEN: Soft, Nontender, Nondistended; Bowel sounds present and normoactive.   EXTREMITIES:  2+ Peripheral Pulses, No clubbing, cyanosis, or edema  CATH SITE: Right wrist benign s/p cath.  No bleeding, no ecchymosis, no hematoma. Extremity Warm to touch, with palpable distal pulses, and brisk capillary refill.

## 2020-02-12 ENCOUNTER — NON-APPOINTMENT (OUTPATIENT)
Age: 70
End: 2020-02-12

## 2020-02-12 ENCOUNTER — APPOINTMENT (OUTPATIENT)
Dept: CARDIOLOGY | Facility: CLINIC | Age: 70
End: 2020-02-12
Payer: COMMERCIAL

## 2020-02-12 VITALS
SYSTOLIC BLOOD PRESSURE: 124 MMHG | HEART RATE: 93 BPM | RESPIRATION RATE: 14 BRPM | BODY MASS INDEX: 27.55 KG/M2 | HEIGHT: 69 IN | DIASTOLIC BLOOD PRESSURE: 58 MMHG | WEIGHT: 186 LBS

## 2020-02-12 DIAGNOSIS — R94.39 ABNORMAL RESULT OF OTHER CARDIOVASCULAR FUNCTION STUDY: ICD-10-CM

## 2020-02-12 PROCEDURE — 93000 ELECTROCARDIOGRAM COMPLETE: CPT

## 2020-02-12 PROCEDURE — 99215 OFFICE O/P EST HI 40 MIN: CPT

## 2020-02-12 NOTE — HISTORY OF PRESENT ILLNESS
[FreeTextEntry1] : Patient is a 71 yo M with tobacco dependence,  HLD, COPD, PAD s/p PCI here for follow up. Struggles with being able to quit smoking. On disability for lung cancer, now in remission. Had chemotherapy and under control he states. 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 Was noted to be having increasing STEVENSON, noted by his wife. Had echo and stress testing, found to have severe CMP and coreg/entresto started. Underwent cath as well and now s/p LAD stent. Feeling well since. Patient denies PND/orthopnea/edema/palpitations/syncope/claudication Currently no complaints. \par  \par \par ROS: GI and  negative

## 2020-02-12 NOTE — DISCUSSION/SUMMARY
[FreeTextEntry1] : Patient is a 71 yo M with tobacco dependence, HLD, COPD, aortic atherosclerosis, AAA with mural thrombus,  PAD s/p PCI here for cardiac evaluation of a LBBB on ECG. Testing shows severe CMP and possibly ischemia with signs inferior infarct. CMP maybe ischemic, related to prior EtOH and substance abuse years ago, possibly genetic. No recent infection due suggest myocarditis. Did have CAD and now s/p PCI to mLAD but does not entirely explain CMP. Continue med managmnet and will need reevaluation in coming months of EF, may need ICD. Will prescribe lifevest in meantime. \par \par 1. Increase entresto to 49/51mg bid, goal to titrate up further. Increase carvedilol to 12.5mg bid next week if tolerating entresto\par 2. Vascular follow up, had recent appointment. Will see again in 6 months\par 3. DAPT and statin, med management of CAD. Will consider cardiac rehab after meds further titrated up\par 4. Repeat echo in 2 months. LifeVest until EF re-evaluated\par 5. Counselled for more than 3 minutes on smoking cessation\par 6. Follow up 1 month. titrate up meds further then\par

## 2020-02-12 NOTE — ASSESSMENT
[FreeTextEntry1] : ECG: SR, LBBB \par \par  HDL 30 LDL 98  (11/2019) \par \par LHC 2/2020:\par 1. LM normal\par 2. mLAD 80% stenosis, s/p MARÍA 3.5x15mm stent \par 3. LCX normal\par 4. pRCA 30% stenosis\par 5. mRCA 50% stenosis\par \par CT Chest 2018:  Scattered aortic calcifications and mural thrombus \par \par ECHO 1/2020:\par 1. Severe LV dysfunction, EF 20-25% with severe inferior/inferolateral hypo\par 2. Normal RV/LA/RA\par 3. Mild TR\par \par AORTIC SCAN\par 1. AAA with stent and mural thrombus\par \par NUCLEAR STRESS TEST 1/2020:\par 1.  INferior/inferoseptal infarct with EF 22%\par 2. No ischemia\par \par LE ARTERIAL DUPLEX\par 1. MOderate plaque\par 2. No hemodynamically significant stenosis\par \par EMILEE/PVR\par 1. R 0.86\par 2. L 0.82\par 3. PVR suggestive of inflow disease\par \par

## 2020-02-12 NOTE — PHYSICAL EXAM
[General Appearance - Well Developed] : well developed [General Appearance - Well Nourished] : well nourished [Normal Conjunctiva] : the conjunctiva exhibited no abnormalities [Normal Oropharynx] : normal oropharynx [Normal Jugular Venous V Waves Present] : normal jugular venous V waves present [] : no respiratory distress [Respiration, Rhythm And Depth] : normal respiratory rhythm and effort [Heart Rate And Rhythm] : heart rate and rhythm were normal [Murmurs] : no murmurs present [Bowel Sounds] : normal bowel sounds [Abdomen Soft] : soft [Abnormal Walk] : normal gait [Nail Clubbing] : no clubbing of the fingernails [Cyanosis, Localized] : no localized cyanosis [Skin Color & Pigmentation] : normal skin color and pigmentation [Affect] : the affect was normal [Oriented To Time, Place, And Person] : oriented to person, place, and time [FreeTextEntry1] : no edema. DP 1= bilaterally, cap refill = 3 seconds bilaterally

## 2020-03-17 ENCOUNTER — APPOINTMENT (OUTPATIENT)
Dept: CARDIOLOGY | Facility: CLINIC | Age: 70
End: 2020-03-17

## 2020-04-12 NOTE — H&P PST ADULT - CARDIOVASCULAR SYMPTOMS
Pt walked steadily in room with RN, sats remained above 96% on room air. No distress or dyspnea noted or reported from pt.   dyspnea on exertion

## 2020-05-13 NOTE — HISTORY OF PRESENT ILLNESS
[FreeTextEntry1] : 69 y/o man with known AAA s/p endovascular repair, comes for evaluation of u/s findings to ensure does not need to be addressed.

## 2020-06-01 ENCOUNTER — APPOINTMENT (OUTPATIENT)
Dept: CARDIOLOGY | Facility: CLINIC | Age: 70
End: 2020-06-01
Payer: COMMERCIAL

## 2020-06-01 VITALS — TEMPERATURE: 98.5 F

## 2020-06-01 PROCEDURE — 93306 TTE W/DOPPLER COMPLETE: CPT

## 2020-06-08 ENCOUNTER — APPOINTMENT (OUTPATIENT)
Dept: CARDIOLOGY | Facility: CLINIC | Age: 70
End: 2020-06-08
Payer: COMMERCIAL

## 2020-06-08 ENCOUNTER — NON-APPOINTMENT (OUTPATIENT)
Age: 70
End: 2020-06-08

## 2020-06-08 VITALS
RESPIRATION RATE: 16 BRPM | OXYGEN SATURATION: 95 % | HEIGHT: 69 IN | BODY MASS INDEX: 26.22 KG/M2 | HEART RATE: 116 BPM | DIASTOLIC BLOOD PRESSURE: 69 MMHG | WEIGHT: 177 LBS | SYSTOLIC BLOOD PRESSURE: 120 MMHG | TEMPERATURE: 98 F

## 2020-06-08 PROCEDURE — 93000 ELECTROCARDIOGRAM COMPLETE: CPT

## 2020-06-08 PROCEDURE — 99215 OFFICE O/P EST HI 40 MIN: CPT

## 2020-06-08 RX ORDER — SACUBITRIL AND VALSARTAN 97; 103 MG/1; MG/1
97-103 TABLET, FILM COATED ORAL TWICE DAILY
Qty: 180 | Refills: 2 | Status: DISCONTINUED | COMMUNITY
Start: 2020-01-15 | End: 2020-06-08

## 2020-06-08 NOTE — DISCUSSION/SUMMARY
[FreeTextEntry1] : Patient is a 71 yo M with tobacco dependence, HLD, COPD, aortic atherosclerosis, AAA with mural thrombus,  PAD s/p PCI here for cardiac evaluation of a LBBB on ECG. Testing shows severe CMP that is multifactorial, does have CAD and s/p PCI but no improvement in CMP. Maybe related to prior EtOH and substance abuse years ago, possibly genetic. No recent infection due suggest myocarditis. Has been having frequent ectopy and short runs NSVT on monitor. \par \par 1. Refer to EP for ICD for primary prophylaxis\par 2. Vascular follow up for PAD\par 3. DAPT and statin, med management of CAD. \par 4. Increase carvedilol to 25mg po bid due to NSVT/PVCs, ultimately increase entresto as well to 97/103 \par 5. Counselled for more than 3 minutes on smoking cessation\par 6. Follow up 6 weeks\par \par

## 2020-06-08 NOTE — HISTORY OF PRESENT ILLNESS
[FreeTextEntry1] : Patient is a 69 yo M with tobacco dependence,  HLD, COPD, PAD s/p PCI here for follow up. Struggles with being able to quit smoking. On disability for lung cancer, now in remission. Had chemotherapy and under control he states. 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 \par \par Was noted to be having increasing STEVENSON, noted by his wife. Had echo and stress testing, found to have severe CMP and coreg/entresto started. Underwent cath as well and now s/p LAD stent. Feeling well since. Patient denies PND/orthopnea/edema/palpitations/syncope/claudication Currently no complaints. Had Life Vest but stopped using. Underwent further event monitoring and now repeat echo. States feels very well, no SOB or CP. \par  \par \par ROS: GI and  negative

## 2020-06-08 NOTE — PHYSICAL EXAM
[Normal Conjunctiva] : the conjunctiva exhibited no abnormalities [General Appearance - Well Developed] : well developed [General Appearance - Well Nourished] : well nourished [Normal Oropharynx] : normal oropharynx [Normal Jugular Venous V Waves Present] : normal jugular venous V waves present [] : no respiratory distress [Respiration, Rhythm And Depth] : normal respiratory rhythm and effort [Heart Rate And Rhythm] : heart rate and rhythm were normal [Murmurs] : no murmurs present [Bowel Sounds] : normal bowel sounds [Abdomen Soft] : soft [Nail Clubbing] : no clubbing of the fingernails [Abnormal Walk] : normal gait [Cyanosis, Localized] : no localized cyanosis [Oriented To Time, Place, And Person] : oriented to person, place, and time [Skin Color & Pigmentation] : normal skin color and pigmentation [Affect] : the affect was normal [FreeTextEntry1] : no edema. DP 1= bilaterally, cap refill = 3 seconds bilaterally

## 2020-06-08 NOTE — ASSESSMENT
[FreeTextEntry1] : ECG: SR, LBBB , PVC \par \par  HDL 30 LDL 98  (11/2019) \par \par ECHO 6/2020:\par 1. TDS\par 2. Severe LV dysfunction, EF 25-30%\par 3. Grade I diastolic dysfunction\par 4. Mild TR\par 5. Mild OLLIE\par \par EVENT MONITOR 5/2020:\par 1. 12 days monitored\par 2. SR\par 3. Frequent PVCs, 28% burden\par 4. Short runs NSVT\par \par LHC 2/2020:\par 1. LM normal\par 2. mLAD 80% stenosis, s/p MARÍA 3.5x15mm stent \par 3. LCX normal\par 4. pRCA 30% stenosis\par 5. mRCA 50% stenosis\par \par CT Chest 2018:  Scattered aortic calcifications and mural thrombus \par \par ECHO 1/2020:\par 1. Severe LV dysfunction, EF 20-25% with severe inferior/inferolateral hypo\par 2. Normal RV/LA/RA\par 3. Mild TR\par \par AORTIC SCAN\par 1. AAA with stent and mural thrombus\par \par NUCLEAR STRESS TEST 1/2020:\par 1.  INferior/inferoseptal infarct with EF 22%\par 2. No ischemia\par \par LE ARTERIAL DUPLEX\par 1. MOderate plaque\par 2. No hemodynamically significant stenosis\par \par EMILEE/PVR\par 1. R 0.86\par 2. L 0.82\par 3. PVR suggestive of inflow disease\par \par

## 2020-06-16 ENCOUNTER — APPOINTMENT (OUTPATIENT)
Dept: VASCULAR SURGERY | Facility: CLINIC | Age: 70
End: 2020-06-16
Payer: COMMERCIAL

## 2020-06-16 VITALS
BODY MASS INDEX: 26.22 KG/M2 | HEIGHT: 69 IN | SYSTOLIC BLOOD PRESSURE: 97 MMHG | OXYGEN SATURATION: 99 % | HEART RATE: 74 BPM | DIASTOLIC BLOOD PRESSURE: 63 MMHG | TEMPERATURE: 97.7 F | WEIGHT: 177 LBS

## 2020-06-16 PROCEDURE — 93978 VASCULAR STUDY: CPT

## 2020-06-16 PROCEDURE — 99214 OFFICE O/P EST MOD 30 MIN: CPT

## 2020-07-16 ENCOUNTER — APPOINTMENT (OUTPATIENT)
Dept: ELECTROPHYSIOLOGY | Facility: CLINIC | Age: 70
End: 2020-07-16
Payer: COMMERCIAL

## 2020-07-16 VITALS
SYSTOLIC BLOOD PRESSURE: 126 MMHG | HEART RATE: 86 BPM | DIASTOLIC BLOOD PRESSURE: 70 MMHG | HEIGHT: 72 IN | RESPIRATION RATE: 16 BRPM | WEIGHT: 177 LBS | TEMPERATURE: 97.9 F | BODY MASS INDEX: 23.98 KG/M2

## 2020-07-16 PROCEDURE — 99245 OFF/OP CONSLTJ NEW/EST HI 55: CPT | Mod: 25

## 2020-07-16 PROCEDURE — 93000 ELECTROCARDIOGRAM COMPLETE: CPT | Mod: 59

## 2020-07-16 NOTE — PHYSICAL EXAM
[General Appearance - Well Developed] : well developed [General Appearance - Well Nourished] : well nourished [Normal Conjunctiva] : the conjunctiva exhibited no abnormalities [Normal Jugular Venous V Waves Present] : normal jugular venous V waves present [Heart Rate And Rhythm] : heart rate and rhythm were normal [Murmurs] : no murmurs present [Heart Sounds] : normal S1 and S2 [Respiration, Rhythm And Depth] : normal respiratory rhythm and effort [Edema] : no peripheral edema present [FreeTextEntry1] : Weak peripheral radial and PT pulses (~1+) [Exaggerated Use Of Accessory Muscles For Inspiration] : no accessory muscle use [Auscultation Breath Sounds / Voice Sounds] : lungs were clear to auscultation bilaterally [Bowel Sounds] : normal bowel sounds [Abdomen Tenderness] : non-tender [Abdomen Soft] : soft [Abnormal Walk] : normal gait [Cyanosis, Localized] : no localized cyanosis [Skin Color & Pigmentation] : normal skin color and pigmentation [Nail Clubbing] : no clubbing of the fingernails [Skin Turgor] : normal skin turgor [] : no rash [Oriented To Time, Place, And Person] : oriented to person, place, and time [Impaired Insight] : insight and judgment were intact [No Anxiety] : not feeling anxious

## 2020-07-16 NOTE — DISCUSSION/SUMMARY
[FreeTextEntry1] : RUSS BUCHANAN is a 70 year old male with hyperlipidemia, COPD, PAD s/p stenting, tobacco use, lung cancer (in remission), coronary artery disease (s/p mLAD SHANTHI), LBBB and mixed cardiomyopathy with NYHA IIC HFrEF (LVEF: 25-30%) who is referred by Dr. Benito in consultation for consideration of primary prevention ICD implantation.\par \par The patient has had a persistently depressed LVEF despite guideline directed medical therapy for over 3 months and revascularization. He has NYHA class II symptoms and a wide LBBB of 160 msec. Given these findings, he meets class I indication for CRT-D.\par \par The risks, benefits, and alternatives were discussed at length. The risks explained include, but are not limited to: pain, bleeding, infection, contrast reaction, acute kidney injury, radiation-induced skin irritation and/or burning, vascular injury and/or thrombosis, pneumothorax, cardiac perforation and/or tamponade, valvular injury, pocket hematoma, lead dislodgement, device infection requiring extraction, lead fracture/de-insulation/failure, inappropriate shocks, death, heart attack, or stroke. The opportunity to ask questions was provided and all were answered to the patient's satisfaction.\par \par At this juncture, he and his wife would like to proceed with CRT-D implantation.\par \par Recommendations:\par - CRT-D implantation

## 2020-07-16 NOTE — REASON FOR VISIT
[Cardiomyopathy] : cardiomyopathy [Consultation] : a consultation regarding [Heart Failure] : congestive heart failure [FreeTextEntry1] : Cardiologist: Dr. Benito [Family Member] : family member

## 2020-07-28 ENCOUNTER — APPOINTMENT (OUTPATIENT)
Dept: CARDIOLOGY | Facility: CLINIC | Age: 70
End: 2020-07-28
Payer: COMMERCIAL

## 2020-07-28 ENCOUNTER — NON-APPOINTMENT (OUTPATIENT)
Age: 70
End: 2020-07-28

## 2020-07-28 VITALS
HEIGHT: 72 IN | BODY MASS INDEX: 23.84 KG/M2 | WEIGHT: 176 LBS | HEART RATE: 79 BPM | TEMPERATURE: 98.1 F | SYSTOLIC BLOOD PRESSURE: 112 MMHG | DIASTOLIC BLOOD PRESSURE: 80 MMHG | RESPIRATION RATE: 16 BRPM

## 2020-07-28 PROCEDURE — 99214 OFFICE O/P EST MOD 30 MIN: CPT

## 2020-07-28 PROCEDURE — 93000 ELECTROCARDIOGRAM COMPLETE: CPT

## 2020-07-28 NOTE — HISTORY OF PRESENT ILLNESS
[FreeTextEntry1] : Patient is a 69 yo M with tobacco dependence,  HLD, COPD, PAD s/p PCI, mixed CMP here for follow up. Struggles with being able to quit smoking. On disability for lung cancer, now in remission. Had chemotherapy and under control he states. 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 \par \par Was noted to be having increasing STEVENSON, noted by his wife. Had echo and stress testing, found to have severe CMP and coreg/entresto started. Underwent cath as well and now s/p LAD stent. Still feeling well since. Patient denies PND/orthopnea/edema/palpitations/syncope/claudication. Currently no complaints. Saw Dr. Peters and awaiting date for CRT-D. Carvedilol increased at last visit. \par  \par \par ROS: GI and  negative

## 2020-07-28 NOTE — PHYSICAL EXAM
[General Appearance - Well Developed] : well developed [Normal Conjunctiva] : the conjunctiva exhibited no abnormalities [General Appearance - Well Nourished] : well nourished [Normal Oropharynx] : normal oropharynx [] : no respiratory distress [Normal Jugular Venous V Waves Present] : normal jugular venous V waves present [Respiration, Rhythm And Depth] : normal respiratory rhythm and effort [Heart Rate And Rhythm] : heart rate and rhythm were normal [Bowel Sounds] : normal bowel sounds [Murmurs] : no murmurs present [Abdomen Soft] : soft [Nail Clubbing] : no clubbing of the fingernails [Abnormal Walk] : normal gait [Cyanosis, Localized] : no localized cyanosis [Skin Color & Pigmentation] : normal skin color and pigmentation [Oriented To Time, Place, And Person] : oriented to person, place, and time [Affect] : the affect was normal [FreeTextEntry1] : no edema. DP 1= bilaterally, cap refill = 3 seconds bilaterally

## 2020-07-28 NOTE — DISCUSSION/SUMMARY
[FreeTextEntry1] : Patient is a 71 yo M with tobacco dependence, HLD, COPD, aortic atherosclerosis, AAA with mural thrombus,  PAD s/p PCI, LBBB, mixed CMP, CAD s/p PCI LAD  here for cardiac follow up. . Testing shows severe CMP that is multifactorial, does have CAD and s/p PCI but no improvement in CMP. Maybe related to prior EtOH and substance abuse years ago, possibly genetic. No recent infection due suggest myocarditis. Has been having frequent ectopy and short runs NSVT on monitor as well, awaiting date now for CRT-D. Seen by EP Dr. Peters.  \par \par 1. EP follow up for CRT-D\par 2. Does not follow up with Vascular for PAD, will arrange testing here later this year or next\par 3. DAPT and statin, med management of CAD. \par 4. Increase entresto to 97/103mg po bid\par 5. Counselled for more than 3 minutes on smoking cessation\par 6. Follow up 2 months\par 7. Will be getting BW for PST\par 8. Consider adding aldactone at follow up\par \par \par

## 2020-08-19 DIAGNOSIS — Z01.818 ENCOUNTER FOR OTHER PREPROCEDURAL EXAMINATION: ICD-10-CM

## 2020-08-21 ENCOUNTER — APPOINTMENT (OUTPATIENT)
Dept: DISASTER EMERGENCY | Facility: CLINIC | Age: 70
End: 2020-08-21

## 2020-08-24 ENCOUNTER — APPOINTMENT (OUTPATIENT)
Dept: DISASTER EMERGENCY | Facility: CLINIC | Age: 70
End: 2020-08-24

## 2020-08-25 LAB — SARS-COV-2 N GENE NPH QL NAA+PROBE: NOT DETECTED

## 2020-08-27 ENCOUNTER — TRANSCRIPTION ENCOUNTER (OUTPATIENT)
Age: 70
End: 2020-08-27

## 2020-08-27 ENCOUNTER — RESULT REVIEW (OUTPATIENT)
Age: 70
End: 2020-08-27

## 2020-08-27 ENCOUNTER — INPATIENT (INPATIENT)
Facility: HOSPITAL | Age: 70
LOS: 0 days | Discharge: HOME | DRG: 227 | End: 2020-08-28
Attending: INTERNAL MEDICINE | Admitting: INTERNAL MEDICINE
Payer: COMMERCIAL

## 2020-08-27 ENCOUNTER — NON-APPOINTMENT (OUTPATIENT)
Age: 70
End: 2020-08-27

## 2020-08-27 VITALS
OXYGEN SATURATION: 97 % | WEIGHT: 177.03 LBS | HEIGHT: 72 IN | HEART RATE: 65 BPM | DIASTOLIC BLOOD PRESSURE: 67 MMHG | TEMPERATURE: 97 F | RESPIRATION RATE: 20 BRPM | SYSTOLIC BLOOD PRESSURE: 102 MMHG

## 2020-08-27 DIAGNOSIS — Z90.89 ACQUIRED ABSENCE OF OTHER ORGANS: Chronic | ICD-10-CM

## 2020-08-27 DIAGNOSIS — I73.9 PERIPHERAL VASCULAR DISEASE, UNSPECIFIED: Chronic | ICD-10-CM

## 2020-08-27 DIAGNOSIS — Z98.890 OTHER SPECIFIED POSTPROCEDURAL STATES: Chronic | ICD-10-CM

## 2020-08-27 DIAGNOSIS — Z86.79 PERSONAL HISTORY OF OTHER DISEASES OF THE CIRCULATORY SYSTEM: Chronic | ICD-10-CM

## 2020-08-27 DIAGNOSIS — I44.7 LEFT BUNDLE-BRANCH BLOCK, UNSPECIFIED: ICD-10-CM

## 2020-08-27 LAB
ABO RH CONFIRMATION: SIGNIFICANT CHANGE UP
ANION GAP SERPL CALC-SCNC: 12 MMOL/L — SIGNIFICANT CHANGE UP (ref 5–17)
APTT BLD: 48.1 SEC — HIGH (ref 27.5–35.5)
BLD GP AB SCN SERPL QL: SIGNIFICANT CHANGE UP
BUN SERPL-MCNC: 23 MG/DL — HIGH (ref 8–20)
CALCIUM SERPL-MCNC: 9.9 MG/DL — SIGNIFICANT CHANGE UP (ref 8.6–10.2)
CHLORIDE SERPL-SCNC: 101 MMOL/L — SIGNIFICANT CHANGE UP (ref 98–107)
CO2 SERPL-SCNC: 24 MMOL/L — SIGNIFICANT CHANGE UP (ref 22–29)
CREAT SERPL-MCNC: 1.34 MG/DL — HIGH (ref 0.5–1.3)
GLUCOSE SERPL-MCNC: 104 MG/DL — HIGH (ref 70–99)
HCT VFR BLD CALC: 43.3 % — SIGNIFICANT CHANGE UP (ref 39–50)
HGB BLD-MCNC: 14.6 G/DL — SIGNIFICANT CHANGE UP (ref 13–17)
INR BLD: 1.17 RATIO — HIGH (ref 0.88–1.16)
MAGNESIUM SERPL-MCNC: 2.1 MG/DL — SIGNIFICANT CHANGE UP (ref 1.6–2.6)
MCHC RBC-ENTMCNC: 29.6 PG — SIGNIFICANT CHANGE UP (ref 27–34)
MCHC RBC-ENTMCNC: 33.7 GM/DL — SIGNIFICANT CHANGE UP (ref 32–36)
MCV RBC AUTO: 87.7 FL — SIGNIFICANT CHANGE UP (ref 80–100)
PLATELET # BLD AUTO: 182 K/UL — SIGNIFICANT CHANGE UP (ref 150–400)
POTASSIUM SERPL-MCNC: 4.9 MMOL/L — SIGNIFICANT CHANGE UP (ref 3.5–5.3)
POTASSIUM SERPL-SCNC: 4.9 MMOL/L — SIGNIFICANT CHANGE UP (ref 3.5–5.3)
PROTHROM AB SERPL-ACNC: 13.5 SEC — SIGNIFICANT CHANGE UP (ref 10.6–13.6)
RBC # BLD: 4.94 M/UL — SIGNIFICANT CHANGE UP (ref 4.2–5.8)
RBC # FLD: 13.1 % — SIGNIFICANT CHANGE UP (ref 10.3–14.5)
SODIUM SERPL-SCNC: 137 MMOL/L — SIGNIFICANT CHANGE UP (ref 135–145)
WBC # BLD: 8.62 K/UL — SIGNIFICANT CHANGE UP (ref 3.8–10.5)
WBC # FLD AUTO: 8.62 K/UL — SIGNIFICANT CHANGE UP (ref 3.8–10.5)

## 2020-08-27 PROCEDURE — 71045 X-RAY EXAM CHEST 1 VIEW: CPT | Mod: 26

## 2020-08-27 PROCEDURE — 33249 INSJ/RPLCMT DEFIB W/LEAD(S): CPT

## 2020-08-27 PROCEDURE — 33225 L VENTRIC PACING LEAD ADD-ON: CPT

## 2020-08-27 PROCEDURE — 93010 ELECTROCARDIOGRAM REPORT: CPT | Mod: 76

## 2020-08-27 RX ORDER — ASPIRIN/CALCIUM CARB/MAGNESIUM 324 MG
81 TABLET ORAL DAILY
Refills: 0 | Status: DISCONTINUED | OUTPATIENT
Start: 2020-08-27 | End: 2020-08-28

## 2020-08-27 RX ORDER — ATORVASTATIN CALCIUM 80 MG/1
10 TABLET, FILM COATED ORAL AT BEDTIME
Refills: 0 | Status: DISCONTINUED | OUTPATIENT
Start: 2020-08-27 | End: 2020-08-28

## 2020-08-27 RX ORDER — CEFAZOLIN SODIUM 1 G
2000 VIAL (EA) INJECTION
Refills: 0 | Status: COMPLETED | OUTPATIENT
Start: 2020-08-27 | End: 2020-08-28

## 2020-08-27 RX ORDER — FLUPHENAZINE HYDROCHLORIDE 1 MG/1
1 TABLET, FILM COATED ORAL
Qty: 0 | Refills: 0 | DISCHARGE

## 2020-08-27 RX ORDER — CLONAZEPAM 1 MG
2 TABLET ORAL AT BEDTIME
Refills: 0 | Status: DISCONTINUED | OUTPATIENT
Start: 2020-08-27 | End: 2020-08-28

## 2020-08-27 RX ORDER — OXYCODONE AND ACETAMINOPHEN 5; 325 MG/1; MG/1
1 TABLET ORAL EVERY 4 HOURS
Refills: 0 | Status: DISCONTINUED | OUTPATIENT
Start: 2020-08-27 | End: 2020-08-28

## 2020-08-27 RX ORDER — PHENYLEPHRINE HYDROCHLORIDE 10 MG/ML
0.5 INJECTION INTRAVENOUS
Qty: 40 | Refills: 0 | Status: DISCONTINUED | OUTPATIENT
Start: 2020-08-27 | End: 2020-08-27

## 2020-08-27 RX ORDER — NICOTINE POLACRILEX 2 MG
1 GUM BUCCAL DAILY
Refills: 0 | Status: DISCONTINUED | OUTPATIENT
Start: 2020-08-27 | End: 2020-08-28

## 2020-08-27 RX ORDER — SODIUM CHLORIDE 9 MG/ML
500 INJECTION INTRAMUSCULAR; INTRAVENOUS; SUBCUTANEOUS ONCE
Refills: 0 | Status: DISCONTINUED | OUTPATIENT
Start: 2020-08-27 | End: 2020-08-27

## 2020-08-27 RX ORDER — CLOPIDOGREL BISULFATE 75 MG/1
75 TABLET, FILM COATED ORAL DAILY
Refills: 0 | Status: DISCONTINUED | OUTPATIENT
Start: 2020-08-27 | End: 2020-08-28

## 2020-08-27 RX ORDER — CHLORHEXIDINE GLUCONATE 213 G/1000ML
1 SOLUTION TOPICAL
Refills: 0 | Status: DISCONTINUED | OUTPATIENT
Start: 2020-08-27 | End: 2020-08-28

## 2020-08-27 RX ORDER — SACUBITRIL AND VALSARTAN 24; 26 MG/1; MG/1
1 TABLET, FILM COATED ORAL
Refills: 0 | Status: DISCONTINUED | OUTPATIENT
Start: 2020-08-27 | End: 2020-08-28

## 2020-08-27 RX ORDER — ACETAMINOPHEN 500 MG
650 TABLET ORAL EVERY 6 HOURS
Refills: 0 | Status: DISCONTINUED | OUTPATIENT
Start: 2020-08-27 | End: 2020-08-28

## 2020-08-27 RX ORDER — NOREPINEPHRINE BITARTRATE/D5W 8 MG/250ML
0.05 PLASTIC BAG, INJECTION (ML) INTRAVENOUS
Qty: 8 | Refills: 0 | Status: DISCONTINUED | OUTPATIENT
Start: 2020-08-27 | End: 2020-08-28

## 2020-08-27 RX ORDER — PANTOPRAZOLE SODIUM 20 MG/1
40 TABLET, DELAYED RELEASE ORAL
Refills: 0 | Status: DISCONTINUED | OUTPATIENT
Start: 2020-08-27 | End: 2020-08-28

## 2020-08-27 RX ORDER — CARVEDILOL PHOSPHATE 80 MG/1
6.25 CAPSULE, EXTENDED RELEASE ORAL EVERY 12 HOURS
Refills: 0 | Status: DISCONTINUED | OUTPATIENT
Start: 2020-08-27 | End: 2020-08-28

## 2020-08-27 RX ADMIN — Medication 7.53 MICROGRAM(S)/KG/MIN: at 19:28

## 2020-08-27 RX ADMIN — Medication 100 MILLIGRAM(S): at 22:30

## 2020-08-27 RX ADMIN — ATORVASTATIN CALCIUM 10 MILLIGRAM(S): 80 TABLET, FILM COATED ORAL at 23:18

## 2020-08-27 RX ADMIN — Medication 1 PATCH: at 19:02

## 2020-08-27 NOTE — CHART NOTE - NSCHARTNOTEFT_GEN_A_CORE
Patient monitored in post operative state with gradually decreasing MAP post CRT-D implantation. MAP fell to 56. Started on Levophed peripherally at lowest possible dose for now. ICU contacted and will evaluate patient for upgrade.
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
## 2020-08-27 NOTE — H&P PST ADULT - NSICDXPASTSURGICALHX_GEN_ALL_CORE_FT
PAST SURGICAL HISTORY:  History of femoral angiogram B/L stents    History of tonsillectomy     PVD (peripheral vascular disease) angiogram stents X2    S/P LASIK surgery PAST SURGICAL HISTORY:  History of bladder surgery     History of femoral angiogram B/L stents    History of tonsillectomy     PVD (peripheral vascular disease) angiogram stents X2    S/P LASIK surgery

## 2020-08-27 NOTE — DISCHARGE NOTE PROVIDER - NSDCCPTREATMENT_GEN_ALL_CORE_FT
PRINCIPAL PROCEDURE  Procedure: Impl CRT defibrillat sys  Findings and Treatment: Cardiac Device Implant Post Operative Instructions  - Do not touch the incision until it is completely healed.   - There are Steristrips (white strips of tape) on your incision, which will start to peel off on their own over the next 2-3 weeks. Do not pick at or peel off the Steristrips.   - Bruising around the implant site or over the chest, side or arm near the incision is normal, and will take a few weeks to resolve.  -Do not lift the affected arm higher than 90 degrees (shoulder height) in any direction for 4 weeks.   - Do not push, pull or lift anything heavier than 10 lbs (about a gallon of milk) with the affected arm for 4 weeks.     - Do not apply soaps, creams, lotions, ointments or powders to the incision until it is completely healed.  - You may take a shower in 24 hours, and allow the water to run over the incision. However, do not submerge the incision in water: do not swim or soak in bath tubs, hot tubs, swimming pools, etc.   You should call the doctor if:   - You notice redness, drainage, swelling, increased tenderness, hot sensation around the incision, bleeding or incision edges pulling apart.  - Your temperature is greater than 100 degrees F for more than 24 hours.  - You notice swelling or bulging at the incision or around the device that was not there when you left the hospital or is increasing in size.  - You experience increased difficulty breathing.  - You notice new/worsening swelling in your legs and ankles.  - You faint or have dizzy spells.  - You have any questions or concerns regarding your device or the procedure.

## 2020-08-27 NOTE — H&P PST ADULT - ATTENDING COMMENTS
I have personally seen, examined, and participated in the care of this patient. I have reviewed all pertinent clinical information, including history, physical exam, plan, and the PA/NP's note and agree except as noted below.    70 year old male with COPD, PAD s/p b/l stenting, tobacco use, lung cancer (in remission; s/p chemo + XRT in 2018), coronary artery disease (s/p mLAD SHANTHI), LBBB (QRSd 160 msec) and mixed cardiomyopathy with NYHA IIC HFrEF (LVEF: 25-30%) with an LVEF that has not improved despite over 3 months of maximally tolerated guideline directed medical therapy. Given wide QRS duration and NYHA IIC HFrEF, he meets class I indication for CRT-D implantation. All risks, benefits and alternatives were discussed at length. He agrees to proceed with CRT-D implantation with primary prevention.     Kaleb Peters MD  Clinical Cardiac Electrophysiology

## 2020-08-27 NOTE — H&P PST ADULT - RS GEN PE MLT RESP DETAILS PC
normal/respirations non-labored/trace rhonchi bases, cleared with coughing/good air movement/breath sounds equal

## 2020-08-27 NOTE — DISCHARGE NOTE PROVIDER - NSDCFUADDINST_GEN_ALL_CORE_FT
Follow up with Dr. Peters in two weeks time. The office will call you with an appointment in 3-5 days. Follow up with Dr. Peters in 2 weeks.  Our office will contact you in 3-5 days to schedule this appointment. Please call [phone] with questions or concerns.

## 2020-08-27 NOTE — H&P PST ADULT - NSICDXPASTMEDICALHX_GEN_ALL_CORE_FT
PAST MEDICAL HISTORY:  AAA (abdominal aortic aneurysm) with mural thrombus    Aortic atherosclerosis     Bipolar 1 disorder     Bladder cancer 2009    CAD (coronary artery disease) s/p mLAD SHANTHI    Cataract     COPD (chronic obstructive pulmonary disease)     Emphysema of lung     HFrEF (heart failure with reduced ejection fraction) EF 25-30% (echo 6/2020)    History of ETOH abuse     History of syphilis     LBBB (left bundle branch block)     Lung cancer s/p chemo and XRT 2018    PVD (peripheral vascular disease) stents X2    Sinus infection     Thyroid nodule PAST MEDICAL HISTORY:  AAA (abdominal aortic aneurysm) with mural thrombus    Aortic atherosclerosis     Bipolar 1 disorder     Bladder cancer 2009 s/p surgey    CAD (coronary artery disease) s/p mLAD SHANTHI    Cataract     COPD (chronic obstructive pulmonary disease)     HFrEF (heart failure with reduced ejection fraction) EF 25-30% (echo 6/2020)    History of ETOH abuse sober x 20 years    History of syphilis     LBBB (left bundle branch block)     Lung cancer s/p chemo and XRT 2018    PVD (peripheral vascular disease) stents X2    Sinus infection     Thyroid nodule

## 2020-08-27 NOTE — DISCHARGE NOTE PROVIDER - CARE PROVIDER_API CALL
Kaleb Peters)  Cardiology; Internal Medicine  [address] [place] [address], [place] [address]  Phone: [phone]  Fax: [phone]  Established Patient  Follow Up Time: 2 weeks

## 2020-08-27 NOTE — PROGRESS NOTE ADULT - SUBJECTIVE AND OBJECTIVE BOX
PROCEDURE(S): MDT CRT-D Implant    ELECTROPHYSIOLOGIST(S): Kaleb Peters MD    COMPLICATIONS:  none          DISPOSITION:  Observation Unit           CONDITION: Stable    Cardiomyopathy:   yes    Ejection fraction: 30%    Beta Blocker/ACEI: on GDMT with Entresto and Coreg    Pt doing well s/p MDT CRT-D implant (DDD ) via left axillary stick. Intraoperatively, patient noted with low BPs requiring levophed, intraoperative echo done which was negative for any effusions.     Programming: Mode DDD 50-130bpm, Atrial output 3.5V @0.5ms, Right ventricular output 3.5V @ 0.4ms, LV 3.5V @ 0.4ms, Atrial sensitivity 0.3mV, Ventricular sensitivity 0.3mV. LV offset 80ms    Exam:   T(C): 36.2 (08-27-20 @ 11:27), Max: 36.2 (08-27-20 @ 10:04)  HR: 98 (08-27-20 @ 17:30) (65 - 98)  BP: 97/60 (08-27-20 @ 17:30) (97/60 - 102/67)  RR: 20 (08-27-20 @ 17:30) (17 - 20)  SpO2: 88% (08-27-20 @ 17:30) (88% - 99%)    Post-procedure vitals:  HR: 98  BP: 85/53  MAP: 68  RR: 18  SpO2: 94% on 4L    VSS, NAD, Awake and alert  Incision: Pressure dressing in place to LACW. Dermabond C/D/I; no bleeding, hematoma, erythema or edema  Card: S1/S2, RRR, no m/g/r  Resp: lungs CTA b/l  Abd: S/NT/ND  Ext: no edema    EKG: Atrial sensed BiV paced, rate 98bpm, QRSD 154ms, single PVC  CXR: Mild increased execllent CRT lead position, No PTX      Assessment:   70 year old male with hyperlipidemia, COPD, PAD s/p stenting, tobacco use, lung cancer (in remission; s/p chemo + XRT in 2018), coronary artery disease (s/p mLAD SHANTHI), LBBB and mixed cardiomyopathy with NYHA IIC HFrEF (LVEF: 25-30%). To summarize his history, he was first evaluated by Dr. Benito in 11/2019 after being found to have a LBBB on his ECG. At the time his wife noted that he had dyspnea when walking up stairs. He underwent TTE and nuclear stress testing which demonstrated depressed LVEF of 20-25%. He underwent cardiac catheterization which revealed 80% mLAD disease which was treated with a SHANTHI. He was initiated on GDMT and despite over 3 months of GDMT, his LVEF has remained low at 25-30%. He presented electively today for MDT CRT-D implant for cardiac resynchronization and primary prevention and is now status post MDT CRT-D implant (DDD ).     Plan:   #1 s/P CRT-D implant   Port CXR reviewed - increase pulmonary vascular congestion, no PTX, excellent lead location  Bedrest x 4 hours post implant, then OOB w/ assist & progress as tolerated.    Continued observation on telemetry overnight.   Cont Ancef 2gm IV q 8 hours x 2 additional doses to complete 24 hour course.   Pain control with PO analgesia PRN.   NO HEPARIN OR LOVENOX, INCLUDING PROPHYLACTIC/SUBCUT DOSING, UNTIL OTHERWISE ADVISED BY EP.   Resume home medications.   PA/Lat CXR and device check in AM.   No raising arm for 6 week  Device check in AM  Pending status overnight, anticipate d/c home tomorrow with outpt f/up in 1-2 weeks.    # 2 Hypotension in the setting of HFrEF (EF 25%)  -Likely secondary to adverse reaction to anesthesia  -CXR reviewed with increase pulmonary vascular congestion, received 600cc in OR and levophed to maintain BP. Hold IV fluid bolus for now and monitor BP. If MAP falls below 60 will consult ICU for upgrade for higher level of care and further recommendations.

## 2020-08-27 NOTE — PROGRESS NOTE ADULT - SUBJECTIVE AND OBJECTIVE BOX
Admission Criteria  Please admit the patient to the following service: CARDIOLOGY  Major Criteria:    - Continuous EKG monitoring is required for condition causing arrhythmia (hyperkalemia, etc)    - Significant volume load > 200 ml      Admit to: 3G[facility]     Patient is being admitted to the inpatient service due to high risk characteristics and need for further management/monitoring and is considered to be at a significantly increased risk of major adverse cardiac and vascular events if discharged.

## 2020-08-27 NOTE — H&P PST ADULT - NSICDXFAMILYHX_GEN_ALL_CORE_FT
FAMILY HISTORY:  Family history of lung cancer    Sibling  Still living? Yes, Estimated age: Age Unknown  Family history of diabetes mellitus, Age at diagnosis: 51-60  Family history of other heart disease, Age at diagnosis: Age Unknown

## 2020-08-27 NOTE — DISCHARGE NOTE PROVIDER - NSDCFUSCHEDAPPT_GEN_ALL_CORE_FT
RUSS BUCHANAN ; 10/15/2020 ; [facility] [address] [facility] RUSS BUCHANAN ; 10/15/2020 ; [facility] [address] [facility] RUSS BUCHANAN ; 10/15/2020 ; [facility] [address] [facility]

## 2020-08-27 NOTE — CONSULT NOTE ADULT - SUBJECTIVE AND OBJECTIVE BOX
REASON FOR CONSULT: Hypotension    CONSULT REQUESTED BY: EP    Patient is a 70y old  Male who presents with a chief complaint of s/p CRT-D (27 Aug 2020 17:42)      BRIEF HOSPITAL COURSE:   Patient is a 70 year old male with a pmhx of hyperlipidemia, COPD, PAD s/p b/l stenting, tobacco use, lung cancer (in remission; s/p chemo + XRT in 2018), coronary artery disease (s/p mLAD SHANTHI), LBBB and mixed cardiomyopathy with NYHA IIC HFrEF (LVEF: 25-30%), presented today for CRT-D placement. Post procedure patient was hypotensive received fluid bolus and remain with low BP      PAST MEDICAL & SURGICAL HISTORY:  CAD (coronary artery disease): s/p mLAD SHANTHI  HFrEF (heart failure with reduced ejection fraction): EF 25-30% (echo 6/2020)  AAA (abdominal aortic aneurysm): with mural thrombus  History of ETOH abuse: sober x 20 years  LBBB (left bundle branch block)  History of syphilis  COPD (chronic obstructive pulmonary disease)  Aortic atherosclerosis  Thyroid nodule  PVD (peripheral vascular disease): stents X2  Cataract  Sinus infection  Bladder cancer: 2009 s/p surgey  Bipolar 1 disorder  Lung cancer: s/p chemo and XRT 2018  History of bladder surgery  History of tonsillectomy  History of femoral angiogram: B/L stents  S/P LASIK surgery  PVD (peripheral vascular disease): angiogram stents X2    Allergies    No Known Allergies    Intolerances      FAMILY HISTORY:  Family history of diabetes mellitus (Sibling)  Family history of lung cancer  Family history of other heart disease (Sibling): cardiomyopathy      Review of Systems:  CONSTITUTIONAL: No fever, chills, or fatigue  EYES: No eye pain, visual disturbances, or discharge  ENMT:  No difficulty hearing, tinnitus, vertigo; No sinus or throat pain  NECK: No pain or stiffness  RESPIRATORY: No cough, wheezing, chills or hemoptysis; No shortness of breath  CARDIOVASCULAR: No chest pain, palpitations, dizziness, or leg swelling  GASTROINTESTINAL: No abdominal or epigastric pain. No nausea, vomiting, or hematemesis; No diarrhea or constipation. No melena or hematochezia.  GENITOURINARY: No dysuria, frequency, hematuria, or incontinence  NEUROLOGICAL: No headaches, memory loss, loss of strength, numbness, or tremors  SKIN: No itching, burning, rashes, or lesions   MUSCULOSKELETAL: No joint pain or swelling; No muscle, back, or extremity pain  PSYCHIATRIC: No depression, anxiety, mood swings, or difficulty sleeping      Medications:  ceFAZolin   IVPB 2000 milliGRAM(s) IV Intermittent <User Schedule>    carvedilol 6.25 milliGRAM(s) Oral every 12 hours  norepinephrine Infusion 0.05 MICROgram(s)/kG/Min IV Continuous <Continuous>  sacubitril 24 mG/valsartan 26 mG 1 Tablet(s) Oral two times a day      acetaminophen   Tablet .. 650 milliGRAM(s) Oral every 6 hours PRN  clonazePAM  Tablet 2 milliGRAM(s) Oral at bedtime  oxycodone    5 mG/acetaminophen 325 mG 1 Tablet(s) Oral every 4 hours PRN      aspirin enteric coated 81 milliGRAM(s) Oral daily  clopidogrel Tablet 75 milliGRAM(s) Oral daily    aluminum hydroxide/magnesium hydroxide/simethicone Suspension 30 milliLiter(s) Oral every 6 hours PRN  pantoprazole    Tablet 40 milliGRAM(s) Oral before breakfast      atorvastatin 10 milliGRAM(s) Oral at bedtime        chlorhexidine 2% Cloths 1 Application(s) Topical <User Schedule>    nicotine - 21 mG/24Hr(s) Patch 1 patch Transdermal daily          ICU Vital Signs Last 24 Hrs  T(C): 36.2 (27 Aug 2020 11:27), Max: 36.2 (27 Aug 2020 10:04)  T(F): 97.2 (27 Aug 2020 11:27), Max: 97.2 (27 Aug 2020 10:04)  HR: 95 (27 Aug 2020 20:15) (65 - 98)  BP: 102/72 (27 Aug 2020 20:15) (75/50 - 102/72)  BP(mean): 86 (27 Aug 2020 20:15) (69 - 86)  ABP: --  ABP(mean): --  RR: 18 (27 Aug 2020 20:15) (16 - 20)  SpO2: 98% (27 Aug 2020 20:15) (88% - 100%)    Vital Signs Last 24 Hrs  T(C): 36.2 (27 Aug 2020 11:27), Max: 36.2 (27 Aug 2020 10:04)  T(F): 97.2 (27 Aug 2020 11:27), Max: 97.2 (27 Aug 2020 10:04)  HR: 95 (27 Aug 2020 20:15) (65 - 98)  BP: 102/72 (27 Aug 2020 20:15) (75/50 - 102/72)  BP(mean): 86 (27 Aug 2020 20:15) (69 - 86)  RR: 18 (27 Aug 2020 20:15) (16 - 20)  SpO2: 98% (27 Aug 2020 20:15) (88% - 100%)        I&O's Detail        LABS:                        14.6   8.62  )-----------( 182      ( 27 Aug 2020 11:36 )             43.3     08-27    137  |  101  |  23.0<H>  ----------------------------<  104<H>  4.9   |  24.0  |  1.34<H>    Ca    9.9      27 Aug 2020 11:36  Mg     2.1     08-27            CAPILLARY BLOOD GLUCOSE        PT/INR - ( 27 Aug 2020 11:36 )   PT: 13.5 sec;   INR: 1.17 ratio         PTT - ( 27 Aug 2020 11:36 )  PTT:48.1 sec    CULTURES:      Physical Examination:    General: No acute distress.  Alert, oriented, interactive, nonfocal    HEENT: Pupils equal, reactive to light.  Symmetric.    PULM: Clear to auscultation bilaterally, no significant sputum production    CVS: Regular rate and rhythm, no murmurs, rubs, or gallops    ABD: Soft, nondistended, nontender, normoactive bowel sounds, no masses    EXT: No edema, nontender    SKIN: Warm and well perfused, no rashes noted.    RADIOLOGY: ***    CRITICAL CARE TIME SPENT: *** REASON FOR CONSULT: Hypotension    CONSULT REQUESTED BY: EP    Patient is a 70y old  Male who presents with a chief complaint of s/p CRT-D (27 Aug 2020 17:42)      BRIEF HOSPITAL COURSE:   Patient is a 70 year old male with a pmhx of hyperlipidemia, COPD, PAD s/p b/l stenting, tobacco use, lung cancer (in remission; s/p chemo + XRT in 2018), coronary artery disease (s/p mLAD SHANTHI), LBBB and mixed cardiomyopathy with NYHA IIC HFrEF (LVEF: 25-30%), presented today for CRT-D placement. Post procedure patient was hypotensive received fluid bolus and remain with low BP. He was then started on peripheral levophed. Stat TTE done to r/o pericardial effusion which was negative. MICU then consulted for further care. Pt seen and examined at bedside, not offering any medical complaints. He denies CP, sob, n/v/d, fever/chills.      PAST MEDICAL & SURGICAL HISTORY:  CAD (coronary artery disease): s/p mLAD SHANTHI  HFrEF (heart failure with reduced ejection fraction): EF 25-30% (echo 6/2020)  AAA (abdominal aortic aneurysm): with mural thrombus  History of ETOH abuse: sober x 20 years  LBBB (left bundle branch block)  History of syphilis  COPD (chronic obstructive pulmonary disease)  Aortic atherosclerosis  Thyroid nodule  PVD (peripheral vascular disease): stents X2  Cataract  Sinus infection  Bladder cancer: 2009 s/p surgey  Bipolar 1 disorder  Lung cancer: s/p chemo and XRT 2018  History of bladder surgery  History of tonsillectomy  History of femoral angiogram: B/L stents  S/P LASIK surgery  PVD (peripheral vascular disease): angiogram stents X2    Allergies    No Known Allergies    Intolerances      Social hx:   sober for 20 years  1/2 ppd smoker x 50    FAMILY HISTORY:  Family history of diabetes mellitus (Sibling)  Family history of lung cancer  Family history of other heart disease (Sibling): cardiomyopathy      Review of Systems:  CONSTITUTIONAL: No fever, chills, or fatigue  EYES: No eye pain, visual disturbances, or discharge  ENMT:  No difficulty hearing, tinnitus, vertigo; No sinus or throat pain  NECK: No pain or stiffness  RESPIRATORY: No cough, wheezing, chills or hemoptysis; No shortness of breath  CARDIOVASCULAR: No chest pain, palpitations, dizziness, or leg swelling  GASTROINTESTINAL: No abdominal or epigastric pain. No nausea, vomiting, or hematemesis; No diarrhea or constipation. No melena or hematochezia.  GENITOURINARY: No dysuria, frequency, hematuria, or incontinence  NEUROLOGICAL: No headaches, memory loss, loss of strength, numbness, or tremors  SKIN: No itching, burning, rashes, or lesions   MUSCULOSKELETAL: No joint pain or swelling; No muscle, back, or extremity pain  PSYCHIATRIC: No depression, anxiety, mood swings, or difficulty sleeping      Medications:  ceFAZolin   IVPB 2000 milliGRAM(s) IV Intermittent <User Schedule>  carvedilol 6.25 milliGRAM(s) Oral every 12 hours  norepinephrine Infusion 0.05 MICROgram(s)/kG/Min IV Continuous <Continuous>  sacubitril 24 mG/valsartan 26 mG 1 Tablet(s) Oral two times a day  acetaminophen   Tablet .. 650 milliGRAM(s) Oral every 6 hours PRN  clonazePAM  Tablet 2 milliGRAM(s) Oral at bedtime  oxycodone    5 mG/acetaminophen 325 mG 1 Tablet(s) Oral every 4 hours PRN  aspirin enteric coated 81 milliGRAM(s) Oral daily  clopidogrel Tablet 75 milliGRAM(s) Oral daily  aluminum hydroxide/magnesium hydroxide/simethicone Suspension 30 milliLiter(s) Oral every 6 hours PRN  pantoprazole    Tablet 40 milliGRAM(s) Oral before breakfast  atorvastatin 10 milliGRAM(s) Oral at bedtime  chlorhexidine 2% Cloths 1 Application(s) Topical <User Schedule>  nicotine - 21 mG/24Hr(s) Patch 1 patch Transdermal daily      ICU Vital Signs Last 24 Hrs  T(C): 36.2 (27 Aug 2020 11:27), Max: 36.2 (27 Aug 2020 10:04)  T(F): 97.2 (27 Aug 2020 11:27), Max: 97.2 (27 Aug 2020 10:04)  HR: 95 (27 Aug 2020 20:15) (65 - 98)  BP: 102/72 (27 Aug 2020 20:15) (75/50 - 102/72)  BP(mean): 86 (27 Aug 2020 20:15) (69 - 86)  RR: 18 (27 Aug 2020 20:15) (16 - 20)  SpO2: 98% (27 Aug 2020 20:15) (88% - 100%)        I&O's Detail        LABS:                        14.6   8.62  )-----------( 182      ( 27 Aug 2020 11:36 )             43.3     08-27    137  |  101  |  23.0<H>  ----------------------------<  104<H>  4.9   |  24.0  |  1.34<H>    Ca    9.9      27 Aug 2020 11:36  Mg     2.1     08-27            CAPILLARY BLOOD GLUCOSE        PT/INR - ( 27 Aug 2020 11:36 )   PT: 13.5 sec;   INR: 1.17 ratio         PTT - ( 27 Aug 2020 11:36 )  PTT:48.1 sec    CULTURES:      Physical Examination:    General: No acute distress.  Alert, oriented, interactive, nonfocal    HEENT: Pupils equal, reactive to light.  Symmetric.    PULM: Clear to auscultation bilaterally, no significant sputum production    CVS: +s1/s2    ABD: Soft, nondistended, nontender, normoactive bowel sounds, no masses    EXT: No edema, nontender    SKIN: Warm and well perfused, bandage c/d/i over left shoulder    Neuro: alert and oriented x3, moves all extr    RADIOLOGY:   TTE w/o pericardial effusion complains of pain/discomfort

## 2020-08-27 NOTE — DISCHARGE NOTE PROVIDER - HOSPITAL COURSE
70 year old male with hyperlipidemia, COPD, PAD s/p stenting, tobacco use, lung cancer (in remission; s/p chemo + XRT in 2018), coronary artery disease (s/p mLAD SHANTHI), LBBB and mixed cardiomyopathy with NYHA IIC HFrEF (LVEF: 25-30%).  Despite GDMT, his LVEF has remained low at 25-30%; he is now POD#1 s/p MDT CRT-D implant for primary prevention of SCD and cardiac resynchronization therapy.  Hypotensive episode last night noted, resolved, most likely due to medication affect.  Now resting comfortably.  The patient was observed overnight without event and was discharged home the following morning with a plan for outpatient follow up.

## 2020-08-27 NOTE — DISCHARGE NOTE PROVIDER - NSDCMRMEDTOKEN_GEN_ALL_CORE_FT
aspirin 81 mg oral tablet: 1 tab(s) orally once a day  atorvastatin 10 mg oral tablet: 1 tab(s) orally once a day  carvedilol 6.25 mg oral tablet: 1 tab(s) orally 2 times a day  clonazePAM: 2 milligram(s) orally once a day (at bedtime)  clopidogrel 75 mg oral tablet: 1 tab(s) orally once a day  Entresto 24 mg-26 mg oral tablet: 1 tab(s) orally 2 times a day  pantoprazole 40 mg oral delayed release tablet: 1 tab(s) orally once a day

## 2020-08-27 NOTE — H&P PST ADULT - HISTORY OF PRESENT ILLNESS
70 year old male with hyperlipidemia, COPD, PAD s/p stenting, tobacco use, lung cancer (in remission; s/p chemo + XRT in 2018), coronary artery disease (s/p mLAD SHANTHI), LBBB and mixed cardiomyopathy with NYHA IIC HFrEF (LVEF: 25-30%). To summarize his history, he was first evaluated by Dr. Benito in 11/2019 after being found to have a LBBB on his ECG. At the time his wife noted that he had dyspnea when walking up stairs. He underwent TTE and nuclear stress testing which demonstrated depressed LVEF of 20-25%. He underwent cardiac catheterization which revealed 80% mLAD disease which was treated with a SHANTHI. He was initiated on GDMT and despite over 3 months of GDMT, his LVEF has remained low at 25-30%. He presents electively today for MDT CRT-D implant for cardiac resynchronization and primary prevention.     Cardiology Summary    EKG 7/16/2020: NSR with LBBB (QRSd 160 msec).  Stress Test NUCLEAR STRESS TEST 1/2020:  Inferior/inferoseptal infarct with EF 22%, No ischemia  ECHO 6/2020: severe LV dysfunction, EF 25-30%. Mild TR, mild OLLIE  ECHO 1/2020: severe LV dysfunction, EF 20-25% with severe inferior/inferolateral hypokinesis, normal RV/LA/RA, mild TR  Cardiac Cath [facility] 2/2020: LM normal, mLAD 80% stenosis, s/p MARÍA 3.5x15mm stent,  LCX normal, pRCA 30% stenosis, mRCA 50% stenosis  EVENT MONITOR 5/2020: (12 days monitoring) SR with Frequent PVCs, 28% burden and short runs NSVT 70 year old male with hyperlipidemia, COPD, PAD s/p b/l stenting, tobacco use, lung cancer (in remission; s/p chemo + XRT in 2018), coronary artery disease (s/p mLAD SHANTHI), LBBB and mixed cardiomyopathy with NYHA IIC HFrEF (LVEF: 25-30%). To summarize his history, he was first evaluated by Dr. Benito in 11/2019 after being found to have a LBBB on his ECG. At the time his wife noted that he had dyspnea when walking up stairs. He underwent TTE and nuclear stress testing which demonstrated depressed LVEF of 20-25%. He underwent cardiac catheterization which revealed 80% mLAD disease which was treated with a SHANTHI. He was initiated on GDMT and despite over 3 months of GDMT, his LVEF has remained low at 25-30%. He presents electively today for MDT CRT-D implant for cardiac resynchronization and primary prevention.     Cardiology Summary:    EKG 7/16/2020: NSR with LBBB (QRSd 160 msec).  Stress Test NUCLEAR STRESS TEST 1/2020:  Inferior/inferoseptal infarct with EF 22%, No ischemia  ECHO 6/2020: severe LV dysfunction, EF 25-30%. Mild TR, mild OLLIE  ECHO 1/2020: severe LV dysfunction, EF 20-25% with severe inferior/inferolateral hypokinesis, normal RV/LA/RA, mild TR  Cardiac Cath [facility] 2/2020: LM normal, mLAD 80% stenosis, s/p MARÍA 3.5x15mm stent,  LCX normal, pRCA 30% stenosis, mRCA 50% stenosis  EVENT MONITOR 5/2020: (12 days monitoring) SR with Frequent PVCs, 28% burden and short runs NSVT

## 2020-08-27 NOTE — H&P PST ADULT - ADDITIONAL PE
8710 Patient received in PACU sleeping , coarse breath sounds. Sats 98%. 7060 Patient remains sleeping, turning head and moving self in bed occaisionally. Mother to bedside    1013 Easily arousable. Sitting up in bed eating pieces of popcicle given to her by mother. .     1020  Patient talking denies any pain. 1038 Pt discharged via wheelchair in grandmother's arms, accompanied by RN and mother. Pt discharged awake and alert, respirations equal and unlabored, skin warm, dry, and intact. Pt and family members' questions and concerns addressed prior to discharge. ASA: 4  Mall: 1

## 2020-08-28 ENCOUNTER — TRANSCRIPTION ENCOUNTER (OUTPATIENT)
Age: 70
End: 2020-08-28

## 2020-08-28 VITALS — OXYGEN SATURATION: 92 % | RESPIRATION RATE: 25 BRPM | HEART RATE: 94 BPM

## 2020-08-28 LAB
ANION GAP SERPL CALC-SCNC: 13 MMOL/L — SIGNIFICANT CHANGE UP (ref 5–17)
BASOPHILS # BLD AUTO: 0.05 K/UL — SIGNIFICANT CHANGE UP (ref 0–0.2)
BASOPHILS NFR BLD AUTO: 0.6 % — SIGNIFICANT CHANGE UP (ref 0–2)
BUN SERPL-MCNC: 24 MG/DL — HIGH (ref 8–20)
CALCIUM SERPL-MCNC: 8.6 MG/DL — SIGNIFICANT CHANGE UP (ref 8.6–10.2)
CHLORIDE SERPL-SCNC: 103 MMOL/L — SIGNIFICANT CHANGE UP (ref 98–107)
CO2 SERPL-SCNC: 18 MMOL/L — LOW (ref 22–29)
CREAT SERPL-MCNC: 1.18 MG/DL — SIGNIFICANT CHANGE UP (ref 0.5–1.3)
EOSINOPHIL # BLD AUTO: 0.38 K/UL — SIGNIFICANT CHANGE UP (ref 0–0.5)
EOSINOPHIL NFR BLD AUTO: 4.2 % — SIGNIFICANT CHANGE UP (ref 0–6)
GLUCOSE SERPL-MCNC: 94 MG/DL — SIGNIFICANT CHANGE UP (ref 70–99)
HCT VFR BLD CALC: 38.2 % — LOW (ref 39–50)
HGB BLD-MCNC: 13 G/DL — SIGNIFICANT CHANGE UP (ref 13–17)
IMM GRANULOCYTES NFR BLD AUTO: 0.3 % — SIGNIFICANT CHANGE UP (ref 0–1.5)
LYMPHOCYTES # BLD AUTO: 1.41 K/UL — SIGNIFICANT CHANGE UP (ref 1–3.3)
LYMPHOCYTES # BLD AUTO: 15.7 % — SIGNIFICANT CHANGE UP (ref 13–44)
MAGNESIUM SERPL-MCNC: 1.9 MG/DL — SIGNIFICANT CHANGE UP (ref 1.6–2.6)
MCHC RBC-ENTMCNC: 29.4 PG — SIGNIFICANT CHANGE UP (ref 27–34)
MCHC RBC-ENTMCNC: 34 GM/DL — SIGNIFICANT CHANGE UP (ref 32–36)
MCV RBC AUTO: 86.4 FL — SIGNIFICANT CHANGE UP (ref 80–100)
MONOCYTES # BLD AUTO: 0.51 K/UL — SIGNIFICANT CHANGE UP (ref 0–0.9)
MONOCYTES NFR BLD AUTO: 5.7 % — SIGNIFICANT CHANGE UP (ref 2–14)
NEUTROPHILS # BLD AUTO: 6.6 K/UL — SIGNIFICANT CHANGE UP (ref 1.8–7.4)
NEUTROPHILS NFR BLD AUTO: 73.5 % — SIGNIFICANT CHANGE UP (ref 43–77)
PLATELET # BLD AUTO: 154 K/UL — SIGNIFICANT CHANGE UP (ref 150–400)
POTASSIUM SERPL-MCNC: 5.4 MMOL/L — HIGH (ref 3.5–5.3)
POTASSIUM SERPL-SCNC: 5.4 MMOL/L — HIGH (ref 3.5–5.3)
RBC # BLD: 4.42 M/UL — SIGNIFICANT CHANGE UP (ref 4.2–5.8)
RBC # FLD: 13 % — SIGNIFICANT CHANGE UP (ref 10.3–14.5)
SODIUM SERPL-SCNC: 134 MMOL/L — LOW (ref 135–145)
WBC # BLD: 8.98 K/UL — SIGNIFICANT CHANGE UP (ref 3.8–10.5)
WBC # FLD AUTO: 8.98 K/UL — SIGNIFICANT CHANGE UP (ref 3.8–10.5)

## 2020-08-28 PROCEDURE — C1887: CPT

## 2020-08-28 PROCEDURE — 33225 L VENTRIC PACING LEAD ADD-ON: CPT

## 2020-08-28 PROCEDURE — 71045 X-RAY EXAM CHEST 1 VIEW: CPT

## 2020-08-28 PROCEDURE — C1730: CPT

## 2020-08-28 PROCEDURE — C1892: CPT

## 2020-08-28 PROCEDURE — C1777: CPT

## 2020-08-28 PROCEDURE — C1883: CPT

## 2020-08-28 PROCEDURE — 71046 X-RAY EXAM CHEST 2 VIEWS: CPT | Mod: 26

## 2020-08-28 PROCEDURE — 36415 COLL VENOUS BLD VENIPUNCTURE: CPT

## 2020-08-28 PROCEDURE — C1882: CPT

## 2020-08-28 PROCEDURE — 86900 BLOOD TYPING SEROLOGIC ABO: CPT

## 2020-08-28 PROCEDURE — C1900: CPT

## 2020-08-28 PROCEDURE — C1894: CPT

## 2020-08-28 PROCEDURE — C1769: CPT

## 2020-08-28 PROCEDURE — C1889: CPT

## 2020-08-28 PROCEDURE — 93005 ELECTROCARDIOGRAM TRACING: CPT

## 2020-08-28 PROCEDURE — 83735 ASSAY OF MAGNESIUM: CPT

## 2020-08-28 PROCEDURE — 33249 INSJ/RPLCMT DEFIB W/LEAD(S): CPT

## 2020-08-28 PROCEDURE — 86901 BLOOD TYPING SEROLOGIC RH(D): CPT

## 2020-08-28 PROCEDURE — 86850 RBC ANTIBODY SCREEN: CPT

## 2020-08-28 PROCEDURE — 80048 BASIC METABOLIC PNL TOTAL CA: CPT

## 2020-08-28 PROCEDURE — 71046 X-RAY EXAM CHEST 2 VIEWS: CPT

## 2020-08-28 PROCEDURE — C1898: CPT

## 2020-08-28 PROCEDURE — 86923 COMPATIBILITY TEST ELECTRIC: CPT

## 2020-08-28 PROCEDURE — 93010 ELECTROCARDIOGRAM REPORT: CPT

## 2020-08-28 PROCEDURE — 85610 PROTHROMBIN TIME: CPT

## 2020-08-28 PROCEDURE — 85730 THROMBOPLASTIN TIME PARTIAL: CPT

## 2020-08-28 PROCEDURE — 85027 COMPLETE CBC AUTOMATED: CPT

## 2020-08-28 RX ADMIN — SACUBITRIL AND VALSARTAN 1 TABLET(S): 24; 26 TABLET, FILM COATED ORAL at 06:01

## 2020-08-28 RX ADMIN — PANTOPRAZOLE SODIUM 40 MILLIGRAM(S): 20 TABLET, DELAYED RELEASE ORAL at 06:01

## 2020-08-28 RX ADMIN — CHLORHEXIDINE GLUCONATE 1 APPLICATION(S): 213 SOLUTION TOPICAL at 06:02

## 2020-08-28 RX ADMIN — Medication 100 MILLIGRAM(S): at 06:10

## 2020-08-28 RX ADMIN — Medication 650 MILLIGRAM(S): at 05:26

## 2020-08-28 RX ADMIN — Medication 81 MILLIGRAM(S): at 11:39

## 2020-08-28 RX ADMIN — Medication 1 PATCH: at 06:05

## 2020-08-28 RX ADMIN — Medication 650 MILLIGRAM(S): at 04:36

## 2020-08-28 RX ADMIN — CLOPIDOGREL BISULFATE 75 MILLIGRAM(S): 75 TABLET, FILM COATED ORAL at 11:40

## 2020-08-28 NOTE — PROGRESS NOTE ADULT - SUBJECTIVE AND OBJECTIVE BOX
Patient is a 70y old  Male who presents with a chief complaint of s/p MDT CRT-D implant (28 Aug 2020 09:02)      BRIEF HOSPITAL COURSE:   71 yo m COPD, PAD s/p stenting, HDL, active tobacco use, lung cancer (now in remission; chemo/xrt 2018), CAD s/p mLAD SHANTHI, LBBB, mixed cardiomyopathy with NYHA IIC HFrEF (LVEF 25-30%) presented for elective CRT-D placement, transferred to MICU overnight for persistent hypotension requiring brief vasopressor therapy requirements.      Events last 24 hours:   Off low dose levo for >12 hours. No events overnight.        PAST MEDICAL & SURGICAL HISTORY:  CAD (coronary artery disease): s/p mLAD SHANTHI  HFrEF (heart failure with reduced ejection fraction): EF 25-30% (echo 6/2020)  AAA (abdominal aortic aneurysm): with mural thrombus  History of ETOH abuse: sober x 20 years  LBBB (left bundle branch block)  History of syphilis  COPD (chronic obstructive pulmonary disease)  Aortic atherosclerosis  Thyroid nodule  PVD (peripheral vascular disease): stents X2  Cataract  Sinus infection  Bladder cancer: 2009 s/p surgey  Bipolar 1 disorder  Lung cancer: s/p chemo and XRT 2018  History of bladder surgery  History of tonsillectomy  History of femoral angiogram: B/L stents  S/P LASIK surgery  PVD (peripheral vascular disease): angiogram stents X2    Allergies  No Known Allergies      FAMILY HISTORY:  Family history of diabetes mellitus (Sibling)  Family history of lung cancer  Family history of other heart disease (Sibling): cardiomyopathy      Social History:   From home       Review of Systems:  "when can I go home"       Physical Examination:    General: Elderly male, lying in bed, NAD    HEENT: NC/AT, Pupils equal, reactive to light.  Symmetric.    PULM: Symmetrical thorax expansion upon respiration. Clear to auscultation bilaterally, no significant sputum production    CVS: Paced rhythm, no murmurs, rubs, or gallops appreciated, left chest PPM site appreciated    ABD: Soft, nondistended, nontender, normoactive bowel sounds, no masses appreciated    EXT: No edema, nontender    SKIN: Warm and well perfused, no rashes noted.    NEURO: Alert, oriented, interactive, nonfocal      Medications:  carvedilol 6.25 milliGRAM(s) Oral every 12 hours  sacubitril 24 mG/valsartan 26 mG 1 Tablet(s) Oral two times a day  acetaminophen   Tablet .. 650 milliGRAM(s) Oral every 6 hours PRN  clonazePAM  Tablet 2 milliGRAM(s) Oral at bedtime  oxycodone    5 mG/acetaminophen 325 mG 1 Tablet(s) Oral every 4 hours PRN  aspirin enteric coated 81 milliGRAM(s) Oral daily  clopidogrel Tablet 75 milliGRAM(s) Oral daily  aluminum hydroxide/magnesium hydroxide/simethicone Suspension 30 milliLiter(s) Oral every 6 hours PRN  pantoprazole    Tablet 40 milliGRAM(s) Oral before breakfast  atorvastatin 10 milliGRAM(s) Oral at bedtime  chlorhexidine 2% Cloths 1 Application(s) Topical <User Schedule>  nicotine - 21 mG/24Hr(s) Patch 1 patch Transdermal daily      ICU Vital Signs Last 24 Hrs  T(C): 36.6 (28 Aug 2020 11:36), Max: 36.9 (28 Aug 2020 07:54)  T(F): 97.9 (28 Aug 2020 11:36), Max: 98.4 (28 Aug 2020 07:54)  HR: 88 (28 Aug 2020 11:00) (82 - 100)  BP: 98/63 (28 Aug 2020 11:00) (75/50 - 120/80)  BP(mean): 76 (28 Aug 2020 11:00) (68 - 96)  ABP: --  ABP(mean): --  RR: 19 (28 Aug 2020 11:00) (15 - 21)  SpO2: 100% (28 Aug 2020 11:00) (88% - 100%)    Vital Signs Last 24 Hrs  T(C): 36.6 (28 Aug 2020 11:36), Max: 36.9 (28 Aug 2020 07:54)  T(F): 97.9 (28 Aug 2020 11:36), Max: 98.4 (28 Aug 2020 07:54)  HR: 88 (28 Aug 2020 11:00) (82 - 100)  BP: 98/63 (28 Aug 2020 11:00) (75/50 - 120/80)  BP(mean): 76 (28 Aug 2020 11:00) (68 - 96)  RR: 19 (28 Aug 2020 11:00) (15 - 21)  SpO2: 100% (28 Aug 2020 11:00) (88% - 100%)      I&O's Detail    27 Aug 2020 07:01  -  28 Aug 2020 07:00  --------------------------------------------------------  IN:    norepinephrine Infusion: 4.5 mL    Solution: 100 mL  Total IN: 104.5 mL    OUT:    Voided: 125 mL  Total OUT: 125 mL  Total NET: -20.5 mL      LABS:                        13.0   8.98  )-----------( 154      ( 28 Aug 2020 03:35 )             38.2     08-28    134<L>  |  103  |  24.0<H>  ----------------------------<  94  5.4<H>   |  18.0<L>  |  1.18    Ca    8.6      28 Aug 2020 03:35  Mg     1.9     08-28      PT/INR - ( 27 Aug 2020 11:36 )   PT: 13.5 sec;   INR: 1.17 ratio    PTT - ( 27 Aug 2020 11:36 )  PTT:48.1 sec      RADIOLOGY:   < from: Xray Chest 2 Views PA/Lat (08.28.20 @ 10:45) >   EXAM:  XR CHEST PA LAT 2V                          PROCEDURE DATE:  08/28/2020      INTERPRETATION:  Chest two views    HISTORY: Pacemaker placement    COMPARISON STUDY: 8/27/2020    Frontal and lateral views of the chest show the heart to be normal in size. Left cardiac pacemaker is again noted. The lungs show progression of right lung atelectasis or small infiltrate and there is no evidence of pneumothorax nor pleural effusion.    IMPRESSION:  Right atelectasis versus infiltrate. Follow-up study is recommended as clinically warranted.    Thank you for the courtesy of this referral.    TESSIE RETANA M.D., ATTENDING RADIOLOGIST  This document has been electronically signed. Aug 28 2020 11:12AM    < end of copied text >      SUPPLEMENTAL O2: RA  LINES: Peripheral   IVF: N  IGOR: N  PPx: PPI  CONTACT: N

## 2020-08-28 NOTE — DISCHARGE NOTE NURSING/CASE MANAGEMENT/SOCIAL WORK - PATIENT PORTAL LINK FT
You can access the FollowMyHealth Patient Portal offered by [facility] by registering at the following website: [email or web address]. By joining [facility]’s FollowMyHealth portal, you will also be able to view your health information using other applications (apps) compatible with our system.

## 2020-08-28 NOTE — DISCHARGE NOTE NURSING/CASE MANAGEMENT/SOCIAL WORK - NSDCPEWEB_GEN_ALL_CORE
[facility] for Tobacco Control website --- [email or web address] --- [email or web address]

## 2020-08-28 NOTE — PATIENT PROFILE ADULT - NSPROMEDSDISPOSITION_GEN_A_NUR
pills in daily organizer, Pt instructed not to take any home meds and to give to family when they visit./bedside

## 2020-08-28 NOTE — PROGRESS NOTE ADULT - ASSESSMENT
69 yo m COPD, PAD s/p stenting, HDL, active tobacco use, lung cancer (now in remission; chemo/xrt 2018), CAD s/p mLAD SHANTHI, LBBB, mixed cardiomyopathy with NYHA IIC HFrEF (LVEF 25-30%) presented for elective CRT-D placement with post op course complicated by persistent hypotension.      NEURO: Nicotine patch, percocet for pain control   CV: s/p CRT-D Placement, chest xray appears unremarkable his am. Awaiting ppm interrogation for tentative discharge home.  HTN on coreg  RESP: COPD nebs as needed  RENAL: No active issues  GI: DASH/TLC diet  ENDO: No active issues  ID: No active issues  HEME: dual antiplt therapy.   DISPO: Full code; tentative plan for discharge home when cleared by EP team.

## 2020-08-28 NOTE — PATIENT PROFILE ADULT - NSPROMUTPARTICIPCAREFT_GEN_A_NUR
patient would like to be included in all aspects of care and be included in all discussions regarding his health, treatment, education regarding defibrillator

## 2020-08-28 NOTE — PATIENT PROFILE ADULT - NSPROEXTENSIONSOFSELF_GEN_A_NUR
pt has clothing (shirt shorts and flip flops) laptop bag, hair spray, hair brush, tooth brush, home meds, and cell phone

## 2020-08-28 NOTE — DISCHARGE NOTE NURSING/CASE MANAGEMENT/SOCIAL WORK - NSDCPEEMAIL_GEN_ALL_CORE
[facility] for Tobacco Control email [email or web address]

## 2020-08-28 NOTE — PROGRESS NOTE ADULT - SUBJECTIVE AND OBJECTIVE BOX
Pt doing well POD #1 s/p MDT CRT-D implant LACW.  Pt he did not sleep well but denies complaint this morning.  Overnight events noted, pt w/hypotension while in recovery.  Levophed gtt titrated off > 12 hrs ago.  BP stable.  LACW pressure dressing removed, pt tolerated well.      EKG: A sensed, BiVpaced 85 bpm   TELE: V paced, no events noted     PAST MEDICAL & SURGICAL HISTORY:  CAD (coronary artery disease): s/p mLAD SHANTHI  HFrEF (heart failure with reduced ejection fraction): EF 25-30% (echo 6/2020)  AAA (abdominal aortic aneurysm): with mural thrombus  History of ETOH abuse: sober x 20 years  LBBB (left bundle branch block)  History of syphilis  COPD (chronic obstructive pulmonary disease)  Aortic atherosclerosis  Thyroid nodule  PVD (peripheral vascular disease): stents X2  Cataract  Sinus infection  Bladder cancer: 2009 s/p surgey  Bipolar 1 disorder  Lung cancer: s/p chemo and XRT 2018  History of bladder surgery  History of tonsillectomy  History of femoral angiogram: B/L stents  S/P LASIK surgery  PVD (peripheral vascular disease): angiogram stents X2    MEDICATIONS  (STANDING):  aspirin enteric coated 81 milliGRAM(s) Oral daily  atorvastatin 10 milliGRAM(s) Oral at bedtime  carvedilol 6.25 milliGRAM(s) Oral every 12 hours  chlorhexidine 2% Cloths 1 Application(s) Topical <User Schedule>  clonazePAM  Tablet 2 milliGRAM(s) Oral at bedtime  clopidogrel Tablet 75 milliGRAM(s) Oral daily  nicotine - 21 mG/24Hr(s) Patch 1 patch Transdermal daily  pantoprazole    Tablet 40 milliGRAM(s) Oral before breakfast  sacubitril 24 mG/valsartan 26 mG 1 Tablet(s) Oral two times a day    MEDICATIONS  (PRN):  acetaminophen   Tablet .. 650 milliGRAM(s) Oral every 6 hours PRN Mild Pain (1 - 3)  aluminum hydroxide/magnesium hydroxide/simethicone Suspension 30 milliLiter(s) Oral every 6 hours PRN Dyspepsia  oxycodone    5 mG/acetaminophen 325 mG 1 Tablet(s) Oral every 4 hours PRN Moderate Pain (4 - 6)    Allergies:  No Known Allergies    Vital Signs Last 24 Hrs  T(C): 36.9 (28 Aug 2020 07:54), Max: 36.9 (28 Aug 2020 07:54)  T(F): 98.4 (28 Aug 2020 07:54), Max: 98.4 (28 Aug 2020 07:54)  HR: 85 (28 Aug 2020 08:00) (65 - 100)  BP: 108/72 (28 Aug 2020 08:00) (75/50 - 120/80)  BP(mean): 86 (28 Aug 2020 08:00) (68 - 96)  RR: 21 (28 Aug 2020 08:00) (16 - 21)  SpO2: 98% (28 Aug 2020 08:00) (88% - 100%)    Physical Exam:  Constitutional: NAD, AAOx3  Cardiovascular: +S1S2 RRR  LACW: no hematoma, swelling, bleeding noted; dermabond c/d/i   Pulmonary: CTA b/l, unlabored  Abd: soft NTND +BS  Extremities: no pedal edema, +distal pulses b/l  Neuro: non focal, PULLIAM x4    LABS:                        13.0   8.98  )-----------( 154      ( 28 Aug 2020 03:35 )             38.2     08-28    134<L>  |  103  |  24.0<H>  ----------------------------<  94  5.4<H>   |  18.0<L>  |  1.18    Ca    8.6      28 Aug 2020 03:35  Mg     1.9     08-28      PT/INR - ( 27 Aug 2020 11:36 )   PT: 13.5 sec;   INR: 1.17 ratio         PTT - ( 27 Aug 2020 11:36 )  PTT:48.1 sec    Assessment:   70 year old male with hyperlipidemia, COPD, PAD s/p stenting, tobacco use, lung cancer (in remission; s/p chemo + XRT in 2018), coronary artery disease (s/p mLAD SHANTHI), LBBB and mixed cardiomyopathy with NYHA IIC HFrEF (LVEF: 25-30%).  Despite GDMT, his LVEF has remained low at 25-30%; he is now POD#1 s/p MDT CRT-D implant for primary prevention of SCD and cardiac resynchronization therapy.  Hypotensive episode last night noted, resolved, most likely due to medication affect.  Now resting comfortably.      Plan:   CXR PA and lat pending; anticipate d/c home once complete.   MDT device check pending.   GDMT: Entresto 24/26 PO BID and Coreg 6.25mg PO BID   Continue other home medications.  Pt instructed as to ROM of affected arm - no lifting/pushing/pulling >10 lbs & shoulder ROM limited to 90deg & below x 6 weeks.   Pt instructed as to incision care and f/up - written instructions included in d/c documents.  Stable for discharge home with outpt follow up. Pt doing well POD #1 s/p MDT CRT-D implant LACW.  Pt he did not sleep well but denies complaint this morning.  Overnight events noted, pt w/hypotension while in recovery.  Levophed gtt titrated off > 12 hrs ago.  BP stable.  LACW pressure dressing removed, pt tolerated well.      EKG: A sensed, BiVpaced 85 bpm   TELE: V paced, no events noted     Device interrogation: parameters confirmed, settings stable    PAST MEDICAL & SURGICAL HISTORY:  CAD (coronary artery disease): s/p mLAD SHANTHI  HFrEF (heart failure with reduced ejection fraction): EF 25-30% (echo 6/2020)  AAA (abdominal aortic aneurysm): with mural thrombus  History of ETOH abuse: sober x 20 years  LBBB (left bundle branch block)  History of syphilis  COPD (chronic obstructive pulmonary disease)  Aortic atherosclerosis  Thyroid nodule  PVD (peripheral vascular disease): stents X2  Cataract  Sinus infection  Bladder cancer: 2009 s/p surgey  Bipolar 1 disorder  Lung cancer: s/p chemo and XRT 2018  History of bladder surgery  History of tonsillectomy  History of femoral angiogram: B/L stents  S/P LASIK surgery  PVD (peripheral vascular disease): angiogram stents X2    MEDICATIONS  (STANDING):  aspirin enteric coated 81 milliGRAM(s) Oral daily  atorvastatin 10 milliGRAM(s) Oral at bedtime  carvedilol 6.25 milliGRAM(s) Oral every 12 hours  chlorhexidine 2% Cloths 1 Application(s) Topical <User Schedule>  clonazePAM  Tablet 2 milliGRAM(s) Oral at bedtime  clopidogrel Tablet 75 milliGRAM(s) Oral daily  nicotine - 21 mG/24Hr(s) Patch 1 patch Transdermal daily  pantoprazole    Tablet 40 milliGRAM(s) Oral before breakfast  sacubitril 24 mG/valsartan 26 mG 1 Tablet(s) Oral two times a day    MEDICATIONS  (PRN):  acetaminophen   Tablet .. 650 milliGRAM(s) Oral every 6 hours PRN Mild Pain (1 - 3)  aluminum hydroxide/magnesium hydroxide/simethicone Suspension 30 milliLiter(s) Oral every 6 hours PRN Dyspepsia  oxycodone    5 mG/acetaminophen 325 mG 1 Tablet(s) Oral every 4 hours PRN Moderate Pain (4 - 6)    Allergies:  No Known Allergies    Vital Signs Last 24 Hrs  T(C): 36.9 (28 Aug 2020 07:54), Max: 36.9 (28 Aug 2020 07:54)  T(F): 98.4 (28 Aug 2020 07:54), Max: 98.4 (28 Aug 2020 07:54)  HR: 85 (28 Aug 2020 08:00) (65 - 100)  BP: 108/72 (28 Aug 2020 08:00) (75/50 - 120/80)  BP(mean): 86 (28 Aug 2020 08:00) (68 - 96)  RR: 21 (28 Aug 2020 08:00) (16 - 21)  SpO2: 98% (28 Aug 2020 08:00) (88% - 100%)    Physical Exam:  Constitutional: NAD, AAOx3  Cardiovascular: +S1S2 RRR  LACW: no hematoma, swelling, bleeding noted; dermabond c/d/i   Pulmonary: CTA b/l, unlabored  Abd: soft NTND +BS  Extremities: no pedal edema, +distal pulses b/l  Neuro: non focal, PULLIAM x4    CXR: leads appropriate and stable, no ptx noted     LABS:                        13.0   8.98  )-----------( 154      ( 28 Aug 2020 03:35 )             38.2     08-28    134<L>  |  103  |  24.0<H>  ----------------------------<  94  5.4<H>   |  18.0<L>  |  1.18    Ca    8.6      28 Aug 2020 03:35  Mg     1.9     08-28      PT/INR - ( 27 Aug 2020 11:36 )   PT: 13.5 sec;   INR: 1.17 ratio         PTT - ( 27 Aug 2020 11:36 )  PTT:48.1 sec    Assessment:   70 year old male with hyperlipidemia, COPD, PAD s/p stenting, tobacco use, lung cancer (in remission; s/p chemo + XRT in 2018), coronary artery disease (s/p mLAD SHANTHI), LBBB and mixed cardiomyopathy with NYHA IIC HFrEF (LVEF: 25-30%).  Despite GDMT, his LVEF has remained low at 25-30%; he is now POD#1 s/p MDT CRT-D implant for primary prevention of SCD and cardiac resynchronization therapy.  Hypotensive episode last night noted, resolved, most likely due to medication affect.  Now resting comfortably.      Plan:   CXR PA and lat pending; anticipate d/c home once complete.   MDT device check pending.   GDMT: Entresto 24/26 PO BID and Coreg 6.25mg PO BID   Continue other home medications.  Pt instructed as to ROM of affected arm - no lifting/pushing/pulling >10 lbs & shoulder ROM limited to 90deg & below x 6 weeks.   Pt instructed as to incision care and f/up - written instructions included in d/c documents.  Stable for discharge home with outpt follow up. Pt doing well POD #1 s/p MDT CRT-D implant LACW.  Pt he did not sleep well but denies complaint this morning.  Overnight events noted, pt w/hypotension while in recovery.  Levophed gtt titrated off > 12 hrs ago.  BP stable.  LACW pressure dressing removed, pt tolerated well.      EKG: A sensed, BiVpaced 85 bpm   TELE: V paced, no events noted     Device interrogation: parameters confirmed, settings stable    PAST MEDICAL & SURGICAL HISTORY:  CAD (coronary artery disease): s/p mLAD SHANTHI  HFrEF (heart failure with reduced ejection fraction): EF 25-30% (echo 6/2020)  AAA (abdominal aortic aneurysm): with mural thrombus  History of ETOH abuse: sober x 20 years  LBBB (left bundle branch block)  History of syphilis  COPD (chronic obstructive pulmonary disease)  Aortic atherosclerosis  Thyroid nodule  PVD (peripheral vascular disease): stents X2  Cataract  Sinus infection  Bladder cancer: 2009 s/p surgey  Bipolar 1 disorder  Lung cancer: s/p chemo and XRT 2018  History of bladder surgery  History of tonsillectomy  History of femoral angiogram: B/L stents  S/P LASIK surgery  PVD (peripheral vascular disease): angiogram stents X2    MEDICATIONS  (STANDING):  aspirin enteric coated 81 milliGRAM(s) Oral daily  atorvastatin 10 milliGRAM(s) Oral at bedtime  carvedilol 6.25 milliGRAM(s) Oral every 12 hours  chlorhexidine 2% Cloths 1 Application(s) Topical <User Schedule>  clonazePAM  Tablet 2 milliGRAM(s) Oral at bedtime  clopidogrel Tablet 75 milliGRAM(s) Oral daily  nicotine - 21 mG/24Hr(s) Patch 1 patch Transdermal daily  pantoprazole    Tablet 40 milliGRAM(s) Oral before breakfast  sacubitril 24 mG/valsartan 26 mG 1 Tablet(s) Oral two times a day    MEDICATIONS  (PRN):  acetaminophen   Tablet .. 650 milliGRAM(s) Oral every 6 hours PRN Mild Pain (1 - 3)  aluminum hydroxide/magnesium hydroxide/simethicone Suspension 30 milliLiter(s) Oral every 6 hours PRN Dyspepsia  oxycodone    5 mG/acetaminophen 325 mG 1 Tablet(s) Oral every 4 hours PRN Moderate Pain (4 - 6)    Allergies:  No Known Allergies    Vital Signs Last 24 Hrs  T(C): 36.9 (28 Aug 2020 07:54), Max: 36.9 (28 Aug 2020 07:54)  T(F): 98.4 (28 Aug 2020 07:54), Max: 98.4 (28 Aug 2020 07:54)  HR: 85 (28 Aug 2020 08:00) (65 - 100)  BP: 108/72 (28 Aug 2020 08:00) (75/50 - 120/80)  BP(mean): 86 (28 Aug 2020 08:00) (68 - 96)  RR: 21 (28 Aug 2020 08:00) (16 - 21)  SpO2: 98% (28 Aug 2020 08:00) (88% - 100%)    Physical Exam:  Constitutional: NAD, AAOx3  Cardiovascular: +S1S2 RRR  LACW: no hematoma, swelling, bleeding noted; dermabond c/d/i   Pulmonary: CTA b/l, unlabored  Abd: soft NTND +BS  Extremities: no pedal edema, +distal pulses b/l  Neuro: non focal, PULLIAM x4    CXR: leads appropriate and stable, no ptx noted     LABS:                        13.0   8.98  )-----------( 154      ( 28 Aug 2020 03:35 )             38.2     08-28    134<L>  |  103  |  24.0<H>  ----------------------------<  94  5.4<H>   |  18.0<L>  |  1.18    Ca    8.6      28 Aug 2020 03:35  Mg     1.9     08-28      PT/INR - ( 27 Aug 2020 11:36 )   PT: 13.5 sec;   INR: 1.17 ratio         PTT - ( 27 Aug 2020 11:36 )  PTT:48.1 sec    Assessment:   70 year old male with hyperlipidemia, COPD, PAD s/p stenting, tobacco use, lung cancer (in remission; s/p chemo + XRT in 2018), coronary artery disease (s/p mLAD SHANTHI), LBBB and mixed cardiomyopathy with NYHA IIC HFrEF (LVEF: 25-30%).  Despite GDMT, his LVEF has remained low at 25-30%; he is now POD#1 s/p MDT CRT-D implant for primary prevention of SCD and cardiac resynchronization therapy.  Hypotensive episode last night noted, resolved, most likely due to medication affect.  Now resting comfortably.      Plan:   CXR PA and lat complete.   MDT device check complete.   GDMT: Entresto 24/26 PO BID and Coreg 6.25mg PO BID   Continue other home medications.  Pt instructed as to ROM of affected arm - no lifting/pushing/pulling >10 lbs & shoulder ROM limited to 90deg & below x 6 weeks.   Pt instructed as to incision care and f/up - written instructions included in d/c documents.  Stable for discharge home with outpt follow up.

## 2020-08-31 PROBLEM — I25.10 ATHEROSCLEROTIC HEART DISEASE OF NATIVE CORONARY ARTERY WITHOUT ANGINA PECTORIS: Chronic | Status: ACTIVE | Noted: 2020-08-27

## 2020-08-31 PROBLEM — F10.11 ALCOHOL ABUSE, IN REMISSION: Chronic | Status: ACTIVE | Noted: 2020-01-29

## 2020-09-14 ENCOUNTER — APPOINTMENT (OUTPATIENT)
Dept: ELECTROPHYSIOLOGY | Facility: CLINIC | Age: 70
End: 2020-09-14
Payer: COMMERCIAL

## 2020-09-14 VITALS
TEMPERATURE: 96.8 F | HEIGHT: 72 IN | SYSTOLIC BLOOD PRESSURE: 62 MMHG | HEART RATE: 77 BPM | DIASTOLIC BLOOD PRESSURE: 40 MMHG | WEIGHT: 177 LBS | BODY MASS INDEX: 23.98 KG/M2 | OXYGEN SATURATION: 97 %

## 2020-09-14 VITALS — DIASTOLIC BLOOD PRESSURE: 54 MMHG | SYSTOLIC BLOOD PRESSURE: 86 MMHG

## 2020-09-14 PROCEDURE — 99024 POSTOP FOLLOW-UP VISIT: CPT

## 2020-09-14 PROCEDURE — 93284 PRGRMG EVAL IMPLANTABLE DFB: CPT

## 2020-09-14 NOTE — REVIEW OF SYSTEMS
[Dyspnea on exertion] : dyspnea during exertion [Headache] : no headache [Shortness Of Breath] : no shortness of breath [Feeling Fatigued] : not feeling fatigued [Chest Pain] : no chest pain [Lower Ext Edema] : no extremity edema [Palpitations] : no palpitations [Dizziness] : no dizziness [Easy Bleeding] : no tendency for easy bleeding [Easy Bruising] : no tendency for easy bruising

## 2020-09-14 NOTE — DISCUSSION/SUMMARY
[FreeTextEntry1] : 70 year old male with hyperlipidemia, COPD, PAD s/p stenting, tobacco use, lung cancer (in remission; s/p chemo + XRT in 2018), coronary artery disease (s/p mLAD SHANTHI), LBBB and mixed cardiomyopathy with NYHA IIC HFrEF (LVEF: 25-30%). Despite GDMT, his LVEF has remained low at 25-30%; he is now s/p MDT\par CRT-D implant for primary prevention of SCD and cardiac resynchronization therapy on 8/27/20. Complicated by hypotension for which he was on pressor support for limited time post operatively. \par \par Upon follow up today he reports he has been feeling well, although resting BP upon arrival 62/40. Denies fatigue, lightheadedness, dizziness, near syncope, syncope. Limited echocardiogram revealed trivial pericardial effusion. Interrogation of CRT-D reveals normal function. A, RV and LV with adequate sense and pace thresholds. No events since implant. BVP 89.2%. BP up to 86/54 after ambulating without difficulty. \par \par Recommendation:\par \par -CRT-D with normal function. Asymptomatic hypotension. As discussed with Dr. Benito, recommended increasing PO fluid intake and he will arrange BP check in his office this week. Counseled on red flag symptoms to report. \par -Site care, remote follow up, and post op arm restrictions reviewed. In person interrogation in 3 months for chronic settings. \par \par Meena PaizC

## 2020-09-14 NOTE — HISTORY OF PRESENT ILLNESS
[de-identified] : 70 year old male with hyperlipidemia, COPD, PAD s/p stenting, tobacco use, lung cancer (in remission; s/p chemo + XRT in 2018), coronary artery disease (s/p mLAD SHANTHI), LBBB and mixed cardiomyopathy with NYHA IIC HFrEF (LVEF: 25-30%). Despite GDMT, his LVEF has remained low at 25-30%; he is now s/p MDT\par CRT-D implant for primary prevention of SCD and cardiac resynchronization therapy on 8/27/20. Complicated by hypotension for which he was on pressor support for limited time post operatively. \par \par Upon follow up today he reports he has been feeling well, although resting BP upon arrival 62/40. Denies fatigue, lightheadedness, dizziness, near syncope, syncope. Limited echocardiogram revealed trivial pericardial effusion. Interrogation of CRT-D reveals normal function. A, RV and LV with adequate sense and pace thresholds. No events since implant. BVP 89.2%.70 year old male with hyperlipidemia, COPD, PAD s/p stenting, tobacco use, lung cancer (in remission; s/p chemo + XRT in 2018), coronary artery disease (s/p mLAD SHANTHI), LBBB and mixed cardiomyopathy with NYHA IIC HFrEF (LVEF: 25-30%). Despite GDMT, his LVEF has remained low at 25-30%; he is now s/p MDT\par CRT-D implant for primary prevention of SCD and cardiac resynchronization therapy on 8/27/20. Complicated by hypotension for which he was on pressor support for limited time post operatively. \par \par Upon follow up today he reports he has been feeling well, although resting BP upon arrival 62/40. Denies fatigue, lightheadedness, dizziness, near syncope, syncope. Limited echocardiogram revealed trivial pericardial effusion. Interrogation of CRT-D reveals normal function. A, RV and LV with adequate sense and pace thresholds. No events since implant. BVP 89.2%. 70 year old male with hyperlipidemia, COPD, PAD s/p stenting, tobacco use, lung cancer (in remission; s/p chemo + XRT in 2018), coronary artery disease (s/p mLAD SHANTHI), LBBB and mixed cardiomyopathy with NYHA IIC HFrEF (LVEF: 25-30%). Despite GDMT, his LVEF has remained low at 25-30%; he is now s/p MDT\par CRT-D implant for primary prevention of SCD and cardiac resynchronization therapy on 8/27/20. Complicated by hypotension for which he was on pressor support for limited time post operatively. \par \par Upon follow up today he reports he has been feeling well, although resting BP upon arrival 62/40. Denies fatigue, lightheadedness, dizziness, near syncope, syncope. Limited echocardiogram revealed trivial pericardial effusion. Interrogation of CRT-D reveals normal function. A, RV and LV with adequate sense and pace thresholds. No events since implant. BVP 89.2%. BP up to 86/54 after ambulating without difficulty.

## 2020-09-14 NOTE — PROCEDURE
[NSR] : normal sinus rhythm [No] : not [Longevity: ___ months] : The estimated remaining battery life is [unfilled] months [DDD] : DDD [CRT-D] : Cardiac resynchronization therapy defibrillator [Threshold Testing Performed] : Threshold testing was performed [Sensing Amplitude ___mv] : sensing amplitude was [unfilled] mv [___V @] : [unfilled] V [Lead Imp:  ___ohms] : lead impedance was [unfilled] ohms [___ ms] : [unfilled] ms [de-identified] : [employer] [de-identified] : [facility] [de-identified] : [place]  [de-identified] : 50 [de-identified] : AP- 0.1%, BVP 89.2%  [de-identified] : 8/27/20

## 2020-09-18 ENCOUNTER — APPOINTMENT (OUTPATIENT)
Dept: CARDIOLOGY | Facility: CLINIC | Age: 70
End: 2020-09-18
Payer: COMMERCIAL

## 2020-09-18 VITALS — SYSTOLIC BLOOD PRESSURE: 97 MMHG | HEART RATE: 76 BPM | TEMPERATURE: 97.6 F | DIASTOLIC BLOOD PRESSURE: 65 MMHG

## 2020-09-18 PROCEDURE — 99211 OFF/OP EST MAY X REQ PHY/QHP: CPT

## 2020-09-18 NOTE — HISTORY OF PRESENT ILLNESS
[FreeTextEntry1] : Patient is a 71 yo M with tobacco dependence,  HLD, COPD, PAD s/p PCI, mixed CMP here for follow up. Struggles with being able to quit smoking. On disability for lung cancer, now in remission. Had chemotherapy and under control he states. 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 \par \par Was noted to be having increasing STEVENSON, noted by his wife. Had echo and stress testing, found to have severe CMP and coreg/entresto started. Underwent cath as well and now s/p LAD stent. Still feeling well since. Patient denies PND/orthopnea/edema/palpitations/syncope/claudication. Due to persistent  LV dysfunction despite optimal med management and revascularization he underwent CRT-D. Had low BP post procedure and in ICU, dcd home. Post hospitalization in EP office BP low as well but asymptomatic. \par  \par \par ROS: GI and  negative

## 2020-09-18 NOTE — DISCUSSION/SUMMARY
[FreeTextEntry1] : Patient is a 71 yo M with tobacco dependence, HLD, COPD, aortic atherosclerosis, AAA with mural thrombus,  PAD s/p PCI, LBBB, mixed CMP, CAD s/p PCI LAD  here for cardiac follow up. Now s/p CRT-D. \par \par 1. \par 2. Does not follow up with Vascular for PAD, will arrange testing here later this year or next\par 3. DAPT and statin, med management of CAD. \par 4. \par 5. Counselled for more than 3 minutes on smoking cessation\par 6. Follow up 2 months\par 7. \par \par \par

## 2020-09-18 NOTE — ASSESSMENT
[FreeTextEntry1] : ECG: SR, LBBB , PVC s\par \par  HDL 30 LDL 98  (11/2019) \par \par ECHO 6/2020:\par 1. TDS\par 2. Severe LV dysfunction, EF 25-30%\par 3. Grade I diastolic dysfunction\par 4. Mild TR\par 5. Mild OLLIE\par \par EVENT MONITOR 5/2020:\par 1. 12 days monitored\par 2. SR\par 3. Frequent PVCs, 28% burden\par 4. Short runs NSVT\par \par LHC 2/2020:\par 1. LM normal\par 2. mLAD 80% stenosis, s/p MARÍA 3.5x15mm stent \par 3. LCX normal\par 4. pRCA 30% stenosis\par 5. mRCA 50% stenosis\par \par CT Chest 2018:  Scattered aortic calcifications and mural thrombus \par \par ECHO 1/2020:\par 1. Severe LV dysfunction, EF 20-25% with severe inferior/inferolateral hypo\par 2. Normal RV/LA/RA\par 3. Mild TR\par \par AORTIC SCAN\par 1. AAA with stent and mural thrombus\par \par NUCLEAR STRESS TEST 1/2020:\par 1.  INferior/inferoseptal infarct with EF 22%\par 2. No ischemia\par \par LE ARTERIAL DUPLEX\par 1. MOderate plaque\par 2. No hemodynamically significant stenosis\par \par EMILEE/PVR\par 1. R 0.86\par 2. L 0.82\par 3. PVR suggestive of inflow disease\par \par

## 2020-09-18 NOTE — PHYSICAL EXAM
[General Appearance - Well Developed] : well developed [General Appearance - Well Nourished] : well nourished [Normal Conjunctiva] : the conjunctiva exhibited no abnormalities [Normal Oropharynx] : normal oropharynx [Normal Jugular Venous V Waves Present] : normal jugular venous V waves present [] : no respiratory distress [Respiration, Rhythm And Depth] : normal respiratory rhythm and effort [Heart Rate And Rhythm] : heart rate and rhythm were normal [Murmurs] : no murmurs present [Bowel Sounds] : normal bowel sounds [Abdomen Soft] : soft [Abnormal Walk] : normal gait [Nail Clubbing] : no clubbing of the fingernails [Cyanosis, Localized] : no localized cyanosis [FreeTextEntry1] : no edema. DP 1= bilaterally, cap refill = 3 seconds bilaterally [Skin Color & Pigmentation] : normal skin color and pigmentation [Oriented To Time, Place, And Person] : oriented to person, place, and time [Affect] : the affect was normal

## 2020-09-23 ENCOUNTER — NON-APPOINTMENT (OUTPATIENT)
Age: 70
End: 2020-09-23

## 2020-10-02 ENCOUNTER — NON-APPOINTMENT (OUTPATIENT)
Age: 70
End: 2020-10-02

## 2020-10-02 ENCOUNTER — APPOINTMENT (OUTPATIENT)
Dept: CARDIOLOGY | Facility: CLINIC | Age: 70
End: 2020-10-02
Payer: COMMERCIAL

## 2020-10-02 VITALS
SYSTOLIC BLOOD PRESSURE: 98 MMHG | BODY MASS INDEX: 23.98 KG/M2 | HEART RATE: 77 BPM | WEIGHT: 177 LBS | TEMPERATURE: 97.4 F | RESPIRATION RATE: 16 BRPM | HEIGHT: 72 IN | OXYGEN SATURATION: 99 % | DIASTOLIC BLOOD PRESSURE: 67 MMHG

## 2020-10-02 PROCEDURE — 93000 ELECTROCARDIOGRAM COMPLETE: CPT

## 2020-10-02 PROCEDURE — 99214 OFFICE O/P EST MOD 30 MIN: CPT

## 2020-10-02 NOTE — DISCUSSION/SUMMARY
[FreeTextEntry1] : Patient is a 71 yo M with tobacco dependence, HLD, COPD, aortic atherosclerosis, AAA with mural thrombus,  PAD s/p PCI, LBBB, mixed CMP, CAD s/p PCI LAD  here for cardiac follow up. . Testing shows severe CMP that is multifactorial, does have CAD and s/p PCI but no improvement in CMP. Maybe related to prior EtOH and substance abuse years ago, possibly genetic. No recent infection due suggest myocarditis.MEd management regardless. \par \par He is now s/p CRT-D. Feeling well. BP low but stable and no symptoms. \par \par 1. On good med regimen, no changes at this time\par 2. VAscular follow up in december\par 3. DAPT and statin, med management of CAD. \par 4. Unable to add aldactone at this time given low BP. Seems to run on the dry side as well\par 5. Recheck echo at follow up to re-eval LV function s/p CRT\par 6. EP follow up, ultimately can follow device here\par 7. FOllow up 2 months\par \par

## 2020-10-02 NOTE — ASSESSMENT
[FreeTextEntry1] : ECG: SR, BiV paced \par \par  HDL 30 LDL 98  (11/2019) \par \par ECHO 6/2020:\par 1. TDS\par 2. Severe LV dysfunction, EF 25-30%\par 3. Grade I diastolic dysfunction\par 4. Mild TR\par 5. Mild OLLIE\par \par EVENT MONITOR 5/2020:\par 1. 12 days monitored\par 2. SR\par 3. Frequent PVCs, 28% burden\par 4. Short runs NSVT\par \par LHC 2/2020:\par 1. LM normal\par 2. mLAD 80% stenosis, s/p MARÍA 3.5x15mm stent \par 3. LCX normal\par 4. pRCA 30% stenosis\par 5. mRCA 50% stenosis\par \par CT Chest 2018:  Scattered aortic calcifications and mural thrombus \par \par ECHO 1/2020:\par 1. Severe LV dysfunction, EF 20-25% with severe inferior/inferolateral hypo\par 2. Normal RV/LA/RA\par 3. Mild TR\par \par AORTIC SCAN\par 1. AAA with stent and mural thrombus\par \par NUCLEAR STRESS TEST 1/2020:\par 1.  INferior/inferoseptal infarct with EF 22%\par 2. No ischemia\par \par LE ARTERIAL DUPLEX\par 1. MOderate plaque\par 2. No hemodynamically significant stenosis\par \par EMILEE/PVR\par 1. R 0.86\par 2. L 0.82\par 3. PVR suggestive of inflow disease\par \par

## 2020-10-02 NOTE — PHYSICAL EXAM
[General Appearance - Well Developed] : well developed [General Appearance - Well Nourished] : well nourished [Normal Conjunctiva] : the conjunctiva exhibited no abnormalities [Normal Oropharynx] : normal oropharynx [Normal Jugular Venous V Waves Present] : normal jugular venous V waves present [] : no respiratory distress [Respiration, Rhythm And Depth] : normal respiratory rhythm and effort [Heart Rate And Rhythm] : heart rate and rhythm were normal [Murmurs] : no murmurs present [Bowel Sounds] : normal bowel sounds [Abdomen Soft] : soft [Abnormal Walk] : normal gait [Nail Clubbing] : no clubbing of the fingernails [Cyanosis, Localized] : no localized cyanosis [Skin Color & Pigmentation] : normal skin color and pigmentation [Oriented To Time, Place, And Person] : oriented to person, place, and time [Affect] : the affect was normal [FreeTextEntry1] : no edema. DP 1= bilaterally, cap refill = 3 seconds bilaterally

## 2020-10-02 NOTE — HISTORY OF PRESENT ILLNESS
[FreeTextEntry1] : Patient is a 69 yo M with tobacco dependence,  HLD, COPD, PAD s/p PCI, mixed CMP here for follow up. Struggles with being able to quit smoking. On disability for lung cancer, now in remission. Had chemotherapy and under control he states. 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 \par \par Was noted to be having increasing STEVENSON, noted by his wife. Had echo and stress testing, found to have severe CMP and coreg/entresto started. Underwent cath as well and now s/p LAD stent. Still feeling well since. Patient denies PND/orthopnea/edema/palpitations/syncope/claudication. Currently no complaints. Underwent CRT-D and was noted to be hypotensive after, resting BP then in office at EP low as well. Advised increased fluid intake. Rechecked here and better, up to 97/65. Feeling well without complaints. \par  \par \par ROS: GI and  negative

## 2020-10-15 ENCOUNTER — APPOINTMENT (OUTPATIENT)
Dept: CARDIOLOGY | Facility: CLINIC | Age: 70
End: 2020-10-15

## 2020-10-15 NOTE — HISTORY OF PRESENT ILLNESS
Preoperative Assessment Note     Ms. Chana Valle is being seen in consultation at the request of No ref. provider found. A copy of this report has been sent to No ref. provider found.    DATE OF PROCEDURE: 11/4/2020    SURGEON: Dr. Smyth    PROCEDURE: R cataract extraction  Indication for surgery: VISUAL BLURRINESS      HPI   L eye sees better  R eye is blurry she is ready to have R cataract extraction  Had low back pain a while back   She got it from bending over to  something  Now is it gone   not able to do too much exercise due to L sidede weakness   tried to walk as much as she can  Tries to eat less  No chest pain ofrunusual sob with her ususal activities  No ankle edema   rash under breasts come and goes ok with tac cream for occ use  No new neuro deficits   not sad      Prior anesthesia: she has undergone prior anesthesia, and no reaction to anesthesia    Aspirin use: Sheis on aspirin.     The patient's medical history consists of the following:   Obesity: Yes  Asthma/ COPD: No  Angina: No  Arrythmia: No  CAD: No   CHF: No  Chronic liver disease: No  CKD: No  Sleep Apnea: she does not  have a diagnosis of sleep apnea, and  does not not use CPAP/BIPAP.     STOP-BANG Questionnaire:   Do you snore loudly (Louder than talking or loud enough to be heard through closed doors)?  No: 0  Do you often feel tired, fatigued, or sleepy during the daytime?  Yes :  +1   Has anyone observed you stop breathing during sleep?  No: 0  Do you have (or are you being treated for) high blood pressure?  Yes :  +1   BMI  BMI > 35 :  +1   Age  Age >50:  +1   Neck Circumference  >40 cm (~16 in): +1   Gender  female :  0   STOP Bang Questionnaire shows risk of sleep apnea based on symptoms as follows: high risk for sleep apnea >= 3 points   Exercise capacity: she can walk stairs with issues, and can walk 1 flights of stairs with difficulty due to weakness  History of infection: she did not have any recent infections.      Review of Systems   All other systems reviewed and are negative.        ALLERGIES:  No Known Allergies  Current Outpatient Medications   Medication Sig   • valsartan (DIOVAN) 160 MG tablet Take 1 tablet by mouth daily.   • amantadine (SYMMETREL) 100 MG capsule TAKE 1 CAPSULE EVERY DAY   • hydrochlorothiazide (HYDRODIURIL) 25 MG tablet TAKE 1 TABLET EVERY DAY   • tiZANidine (ZANAFLEX) 4 MG tablet Take 1 tablet by mouth every 6 hours as needed (spasm). As above   • interferon beta-1a (AVONEX PEN) 30 MCG/0.5ML injection Inject 0.5 mLs into the muscle every 7 days. Indications: Relapsing, Remitting Multiple Sclerosis   • pravastatin (PRAVACHOL) 80 MG tablet Take 1 tablet by mouth daily.   • triamcinolone (ARISTOCORT) 0.1 % cream Apply topically 2 times daily as needed (rash).   • gabapentin (NEURONTIN) 100 MG capsule Take 1 capsule by mouth 3 times daily.   • aspirin (Aspirin 81) 81 MG EC tablet Take 1 tablet by mouth daily.   • fluconazole (DIFLUCAN) 200 MG tablet Take 1 tablet by mouth daily.   • nystatin-triamcinolone (MYCOLOG II) 953131-5.1 UNIT/GM-% cream Apply topically 2 times daily.     No current facility-administered medications for this visit.        Problem List:   Patient Active Problem List   Diagnosis   • Ataxia   • Chronic left-sided low back pain without sciatica   • Hypercholesterolemia   • Essential hypertension   • Obesity   • Osteoarthritis of knee   • Osteoarthritis of shoulder   • Morbid obesity with BMI of 45.0-49.9, adult (CMS/[facility])   • MS (multiple sclerosis) (CMS/[facility])   • Hemorrhoids   • Constipation   • Screening for breast cancer   • Acquired left foot drop   • Type 2 diabetes mellitus without complication, without long-term current use of insulin (CMS/[facility])   • Left foot drop   • Diastasis recti   • Abscess of right thigh   • Open wound of right thigh   • Age-related nuclear cataract of left eye   • Candidal intertrigo   • Pre-op examination   • Age-related nuclear cataract of right eye          Past Medical History:   Diagnosis Date   • Arthritis    • Essential (primary) hypertension    • Multiple sclerosis (CMS/HCC)        No past surgical history on file.    Social History     Socioeconomic History   • Marital status: Single     Spouse name: Not on file   • Number of children: Not on file   • Years of education: Not on file   • Highest education level: Not on file   Occupational History   • Not on file   Social Needs   • Financial resource strain: Not on file   • Food insecurity     Worry: Not on file     Inability: Not on file   • Transportation needs     Medical: Not on file     Non-medical: Not on file   Tobacco Use   • Smoking status: Never Smoker   • Smokeless tobacco: Never Used   Substance and Sexual Activity   • Alcohol use: Never     Frequency: Never   • Drug use: Never   • Sexual activity: Not on file   Lifestyle   • Physical activity     Days per week: 0 days     Minutes per session: 0 min   • Stress: Rather much   Relationships   • Social connections     Talks on phone: Not on file     Gets together: Not on file     Attends Zoroastrian service: Not on file     Active member of club or organization: Not on file     Attends meetings of clubs or organizations: Not on file     Relationship status: Not on file   • Intimate partner violence     Fear of current or ex partner: Not on file     Emotionally abused: Not on file     Physically abused: Not on file     Forced sexual activity: Not on file   Other Topics Concern   • Not on file   Social History Narrative   • Not on file       Review of patient's family status indicates:    Mother                                           Father                         Alive                       Comment: age 94    Sister                         Alive                         Physical Examination  Blood pressure 130/80, pulse 63, temperature 97.2 °F (36.2 °C), resp. rate 18, height 5' 7.9\" (1.725 m), weight 126.1 kg (278 lb), SpO2 100 %.  [FreeTextEntry1] : Patient is a 68 yo M with tobacco dependence,  HLD, COPD, PAD s/p PCI here for cardiac evaluation. Struggles with being able to quit smoking. On disability for lung cancer, now in remission. Had chemotherapy and under control he states. 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 Wife notes he is short of breath going up stairs. PAtient doesn’t notice it but very limited in activity. NO chest discomfort. Patient denies PND/orthopnea/edema/palpitations/syncope/claudication. Had been vey active prior to chemotherapy in 2014, has been much less since then. Also memory a bit worse since. \par \par ROS: GI negative, all others negative    Physical Exam   Constitutional: She is oriented to person, place, and time. She appears well-developed and well-nourished.   HENT:   Head: Normocephalic and atraumatic.   Neck: Normal range of motion. Neck supple.   Cardiovascular: Normal rate, regular rhythm and intact distal pulses.   No murmur heard.  Pulmonary/Chest: Effort normal and breath sounds normal.   Abdominal: Soft. Bowel sounds are normal.   abd bulging from diastasis   Musculoskeletal:      Comments: Weakness of L leg   Neurological: She is alert and oriented to person, place, and time. She has normal reflexes.   Skin:   Diffuse erythema under breasts and under abd folds   Psychiatric: She has a normal mood and affect. Her behavior is normal. Judgment and thought content normal.   Nursing note and vitals reviewed.      Labs and EKG     EK/15/2020  Sinus bradycardia and LAD    ASSESSMENT:    Patient Active Problem List   Diagnosis   • Ataxia   • Chronic left-sided low back pain without sciatica   • Hypercholesterolemia   • Essential hypertension   • Obesity   • Osteoarthritis of knee   • Osteoarthritis of shoulder   • Morbid obesity with BMI of 45.0-49.9, adult (CMS/HCC)   • MS (multiple sclerosis) (CMS/HCC)   • Hemorrhoids   • Constipation   • Screening for breast cancer   • Acquired left foot drop   • Type 2 diabetes mellitus without complication, without long-term current use of insulin (CMS/HCC)   • Left foot drop   • Diastasis recti   • Abscess of right thigh   • Open wound of right thigh   • Age-related nuclear cataract of left eye   • Candidal intertrigo   • Pre-op examination   • Age-related nuclear cataract of right eye     Essential hypertension  Hypertension is improving with treatment.  Continue current treatment regimen.  Blood pressure will be reassessed in 3 months.    Type 2 diabetes mellitus without complication, without long-term current use of insulin (CMS/HCC)  Diabetes is improving with treatment.   Dietary recommendations  Magdalena Mast MD

## 2020-11-03 ENCOUNTER — APPOINTMENT (OUTPATIENT)
Dept: CARDIOLOGY | Facility: CLINIC | Age: 70
End: 2020-11-03
Payer: COMMERCIAL

## 2020-11-03 PROCEDURE — 99072 ADDL SUPL MATRL&STAF TM PHE: CPT

## 2020-11-03 PROCEDURE — 93306 TTE W/DOPPLER COMPLETE: CPT

## 2020-11-10 ENCOUNTER — TRANSCRIPTION ENCOUNTER (OUTPATIENT)
Age: 70
End: 2020-11-10

## 2020-11-30 ENCOUNTER — APPOINTMENT (OUTPATIENT)
Dept: ELECTROPHYSIOLOGY | Facility: CLINIC | Age: 70
End: 2020-11-30

## 2020-12-02 ENCOUNTER — APPOINTMENT (OUTPATIENT)
Dept: CARDIOLOGY | Facility: CLINIC | Age: 70
End: 2020-12-02
Payer: COMMERCIAL

## 2020-12-02 ENCOUNTER — NON-APPOINTMENT (OUTPATIENT)
Age: 70
End: 2020-12-02

## 2020-12-02 VITALS
RESPIRATION RATE: 16 BRPM | DIASTOLIC BLOOD PRESSURE: 78 MMHG | SYSTOLIC BLOOD PRESSURE: 105 MMHG | TEMPERATURE: 97.3 F | HEART RATE: 76 BPM | OXYGEN SATURATION: 97 % | HEIGHT: 72 IN | BODY MASS INDEX: 24.11 KG/M2 | WEIGHT: 178 LBS

## 2020-12-02 PROCEDURE — 93000 ELECTROCARDIOGRAM COMPLETE: CPT

## 2020-12-02 PROCEDURE — 99072 ADDL SUPL MATRL&STAF TM PHE: CPT

## 2020-12-02 PROCEDURE — 99214 OFFICE O/P EST MOD 30 MIN: CPT

## 2020-12-09 ENCOUNTER — APPOINTMENT (OUTPATIENT)
Dept: VASCULAR SURGERY | Facility: CLINIC | Age: 70
End: 2020-12-09

## 2020-12-14 ENCOUNTER — APPOINTMENT (OUTPATIENT)
Dept: CARDIOLOGY | Facility: CLINIC | Age: 70
End: 2020-12-14
Payer: COMMERCIAL

## 2020-12-14 PROCEDURE — 93289 INTERROG DEVICE EVAL HEART: CPT

## 2020-12-14 PROCEDURE — 99072 ADDL SUPL MATRL&STAF TM PHE: CPT

## 2021-02-09 NOTE — ASSESSMENT
[FreeTextEntry1] : ECG: SR, BiV paced \par \par  HDL 30 LDL 98  (11/2019) \par \par ECHO 11/2020:\par 1. Moderate LV systolic dysfunction, EF 35-40%\par 2. Grade I diastolic dysfunction\par 3. Low normal RV function\par 4. Normal LA/RA\par 5. Mild to moderate TR, RVSP 31mmHg\par \par ECHO 6/2020:\par 1. TDS\par 2. Severe LV dysfunction, EF 25-30%\par 3. Grade I diastolic dysfunction\par 4. Mild TR\par 5. Mild OLLIE\par \par EVENT MONITOR 5/2020:\par 1. 12 days monitored\par 2. SR\par 3. Frequent PVCs, 28% burden\par 4. Short runs NSVT\par \par LHC 2/2020:\par 1. LM normal\par 2. mLAD 80% stenosis, s/p MARÍA 3.5x15mm stent \par 3. LCX normal\par 4. pRCA 30% stenosis\par 5. mRCA 50% stenosis\par \par CT Chest 2018:  Scattered aortic calcifications and mural thrombus \par \par ECHO 1/2020:\par 1. Severe LV dysfunction, EF 20-25% with severe inferior/inferolateral hypo\par 2. Normal RV/LA/RA\par 3. Mild TR\par \par AORTIC SCAN\par 1. AAA with stent and mural thrombus\par \par NUCLEAR STRESS TEST 1/2020:\par 1.  INferior/inferoseptal infarct with EF 22%\par 2. No ischemia\par \par LE ARTERIAL DUPLEX\par 1. MOderate plaque\par 2. No hemodynamically significant stenosis\par \par EMILEE/PVR\par 1. R 0.86\par 2. L 0.82\par 3. PVR suggestive of inflow disease\par \par

## 2021-02-09 NOTE — DISCUSSION/SUMMARY
[FreeTextEntry1] : Patient is a 69 yo M with tobacco dependence, HLD, COPD, aortic atherosclerosis, AAA with mural thrombus,  PAD s/p PCI, LBBB, mixed CMP, CAD s/p PCI LAD  here for cardiac follow up. . Testing shows severe CMP that is multifactorial, does have CAD and s/p PCI but no improvement in CMP. Maybe related to prior EtOH and substance abuse years ago, possibly genetic. No recent infection due suggest myocarditis. continue with med management, he is now s/p CRT as well. Echo does show some improvement in LV function, although TDS. \par  \par \par 1. On good med regimen, no changes at this time, EF has improved\par 2. VAscular follow up, has to reschedule appointment\par 3. DAPT and statin, med management of CAD. \par 4. Unable to add aldactone at this time given low BP. Seems to run on the dry side as well\par 5. Will arrange device interrogation later this month\par 6. Follow up 3 months\par \par

## 2021-02-09 NOTE — ADDENDUM
[FreeTextEntry1] : 12/28/2020: Patient has been stable from CV standpoint, recent increase in EF as well. No new cardiac symptoms. Moderate CV risk for low risk orthopedic surgery. May hold ASA/plavix 5-7 days prior and restart post operatively. \par \par 2/9/2021: No new cardiac symptoms at this time. Remains at moderate CV risk for low risk orthopedic surgery. Hold ASA/plavix 5-7 days prior and restart post-operatively.

## 2021-02-09 NOTE — HISTORY OF PRESENT ILLNESS
[FreeTextEntry1] : Patient is a 71 yo M with tobacco dependence,  HLD, COPD, PAD s/p PCI, mixed CMP here for follow up. Struggles with being able to quit smoking. On disability for lung cancer, now in remission. Had chemotherapy and under control he states. 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 \par \par Was noted to be having increasing STEVENSON, noted by his wife. Had echo and stress testing, found to have severe CMP and coreg/entresto started. Underwent cath as well and  s/p LAD stent 2/2020. Still feeling well since. Patient denies PND/orthopnea/edema/palpitations/syncope/claudication. Currently no complaints. Underwent CRT-D and did have post-procedural hypotension but has improved. Struggling with some knee pain, seen ortho later today. \par  \par ROS: GI and  negative

## 2021-03-01 ENCOUNTER — NON-APPOINTMENT (OUTPATIENT)
Age: 71
End: 2021-03-01

## 2021-03-01 ENCOUNTER — APPOINTMENT (OUTPATIENT)
Dept: ELECTROPHYSIOLOGY | Facility: CLINIC | Age: 71
End: 2021-03-01
Payer: COMMERCIAL

## 2021-03-01 PROCEDURE — 93295 DEV INTERROG REMOTE 1/2/MLT: CPT

## 2021-03-01 PROCEDURE — 93296 REM INTERROG EVL PM/IDS: CPT

## 2021-03-18 ENCOUNTER — NON-APPOINTMENT (OUTPATIENT)
Age: 71
End: 2021-03-18

## 2021-03-25 ENCOUNTER — APPOINTMENT (OUTPATIENT)
Dept: CARDIOLOGY | Facility: CLINIC | Age: 71
End: 2021-03-25
Payer: COMMERCIAL

## 2021-03-25 ENCOUNTER — NON-APPOINTMENT (OUTPATIENT)
Age: 71
End: 2021-03-25

## 2021-03-25 VITALS
HEIGHT: 72 IN | WEIGHT: 170 LBS | OXYGEN SATURATION: 100 % | BODY MASS INDEX: 23.03 KG/M2 | RESPIRATION RATE: 16 BRPM | HEART RATE: 84 BPM | SYSTOLIC BLOOD PRESSURE: 82 MMHG | DIASTOLIC BLOOD PRESSURE: 58 MMHG | TEMPERATURE: 97.1 F

## 2021-03-25 PROCEDURE — 99072 ADDL SUPL MATRL&STAF TM PHE: CPT

## 2021-03-25 PROCEDURE — 99214 OFFICE O/P EST MOD 30 MIN: CPT | Mod: 25

## 2021-03-25 PROCEDURE — 93289 INTERROG DEVICE EVAL HEART: CPT

## 2021-03-25 PROCEDURE — 93000 ELECTROCARDIOGRAM COMPLETE: CPT | Mod: 59

## 2021-03-25 RX ORDER — FLUPHENAZINE HYDROCHLORIDE 2.5 MG/1
2.5 TABLET, FILM COATED ORAL TWICE DAILY
Refills: 0 | Status: DISCONTINUED | COMMUNITY
End: 2021-03-25

## 2021-03-25 NOTE — DISCUSSION/SUMMARY
[FreeTextEntry1] : Patient is a 69 yo M with tobacco dependence, HLD, COPD, aortic atherosclerosis, AAA with mural thrombus,  PAD s/p PCI LAD 2/2020, LBBB, mixed CMP,   here for cardiac follow up.  Testing shows severe CMP that is multifactorial, does have CAD and s/p PCI but no improvement in CMP. Maybe related to prior EtOH and substance abuse years ago, possibly genetic. No recent infection due suggest myocarditis. continue with med management, he is now s/p CRT as well. \par -Echo does show some improvement in LV function fall 2020, although TDS. \par -Recent interrogation shows 1 minute run of PAF, no A/C indicated given short run unless recurs. Monitored remotely by EP as well. Only 82 %BiV paced, will d/w EP \par -BP runs low, continues to tolerate. Needs to increase fluid intake as runs dry. Also lost some weight\par  \par \par 1. On good med regimen, no changes at this time, EF has improved. BP low but will conitnue to monitor, needs to increase fluid intake\par 2. VAscular follow up, has to reschedule appointment\par 3. DAPT and statin, med management of CAD. \par 4. Unable to add aldactone at this time given low BP and runs dry \par 5. Continue to monitor for PAF, if recurrence will need to re-assess A/C\par 6. Follow up 2-3 months\par 7. BW with BMP, Mg, lipids and BNP.\par \par

## 2021-03-25 NOTE — HISTORY OF PRESENT ILLNESS
[FreeTextEntry1] : Patient is a 69 yo M with tobacco dependence,  HLD, COPD, PAD s/p PCI, mixed CMP here for follow up. Struggles with being able to quit smoking. On disability for lung cancer, now in remission. Had chemotherapy and under control he states. 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 \par \par Was noted to be having increasing STEVENSON, noted by his wife. Had echo and stress testing, found to have severe CMP and coreg/entresto started. Underwent cath as well and  s/p LAD stent 2/2020. Underwent CRT-D subsequently due to no improvement in EF.did have post-procedural hypotension but has improved. \par \par More recently has  undergone orthopedic surgery, right hip. Tolerated well, walks with cane now. No new CV complaints. Patient denies PND/orthopnea/edema/palpitations/syncope/claudication. No CP/SOB. Quit smoking 2 months ago. \par  \par ROS: GI and  negative

## 2021-03-25 NOTE — ASSESSMENT
[FreeTextEntry1] : ECG: SR, BiV paced \par \par \par Na 139 K 4.5 Cl 106 CO2 26 BUN 35 Creat 1.9 (1/2021)\par  HDL 30 LDL 98  (11/2019) \par \par \par \par Interrogation 3/25/2021:\par 1. Normal function, BiV pacaed 82%\par 2. 1 minute run PAF\par \par ECHO 11/2020:\par 1. Moderate LV systolic dysfunction, EF 35-40%\par 2. Grade I diastolic dysfunction\par 3. Low normal RV function\par 4. Normal LA/RA\par 5. Mild to moderate TR, RVSP 31mmHg\par \par ECHO 6/2020:\par 1. TDS\par 2. Severe LV dysfunction, EF 25-30%\par 3. Grade I diastolic dysfunction\par 4. Mild TR\par 5. Mild OLLIE\par \par EVENT MONITOR 5/2020:\par 1. 12 days monitored\par 2. SR\par 3. Frequent PVCs, 28% burden\par 4. Short runs NSVT\par \par LHC 2/2020:\par 1. LM normal\par 2. mLAD 80% stenosis, s/p MARÍA 3.5x15mm stent \par 3. LCX normal\par 4. pRCA 30% stenosis\par 5. mRCA 50% stenosis\par \par CT Chest 2018:  Scattered aortic calcifications and mural thrombus \par \par ECHO 1/2020:\par 1. Severe LV dysfunction, EF 20-25% with severe inferior/inferolateral hypo\par 2. Normal RV/LA/RA\par 3. Mild TR\par \par AORTIC SCAN\par 1. AAA with stent and mural thrombus\par \par NUCLEAR STRESS TEST 1/2020:\par 1.  INferior/inferoseptal infarct with EF 22%\par 2. No ischemia\par \par LE ARTERIAL DUPLEX\par 1. MOderate plaque\par 2. No hemodynamically significant stenosis\par \par EMILEE/PVR\par 1. R 0.86\par 2. L 0.82\par 3. PVR suggestive of inflow disease\par \par

## 2021-03-26 ENCOUNTER — NON-APPOINTMENT (OUTPATIENT)
Age: 71
End: 2021-03-26

## 2021-03-31 ENCOUNTER — APPOINTMENT (OUTPATIENT)
Dept: CARDIOLOGY | Facility: CLINIC | Age: 71
End: 2021-03-31
Payer: MEDICARE

## 2021-03-31 PROCEDURE — ZZZZZ: CPT

## 2021-04-05 ENCOUNTER — RX RENEWAL (OUTPATIENT)
Age: 71
End: 2021-04-05

## 2021-04-27 ENCOUNTER — NON-APPOINTMENT (OUTPATIENT)
Age: 71
End: 2021-04-27

## 2021-04-27 ENCOUNTER — APPOINTMENT (OUTPATIENT)
Dept: ELECTROPHYSIOLOGY | Facility: CLINIC | Age: 71
End: 2021-04-27
Payer: COMMERCIAL

## 2021-04-27 VITALS
DIASTOLIC BLOOD PRESSURE: 70 MMHG | WEIGHT: 168 LBS | HEIGHT: 72 IN | SYSTOLIC BLOOD PRESSURE: 107 MMHG | OXYGEN SATURATION: 97 % | TEMPERATURE: 97.5 F | BODY MASS INDEX: 22.75 KG/M2 | HEART RATE: 95 BPM

## 2021-04-27 PROCEDURE — 99215 OFFICE O/P EST HI 40 MIN: CPT | Mod: 25

## 2021-04-27 PROCEDURE — 99072 ADDL SUPL MATRL&STAF TM PHE: CPT

## 2021-04-27 PROCEDURE — 93284 PRGRMG EVAL IMPLANTABLE DFB: CPT | Mod: 59

## 2021-04-27 PROCEDURE — 93000 ELECTROCARDIOGRAM COMPLETE: CPT | Mod: 59

## 2021-04-27 NOTE — HISTORY OF PRESENT ILLNESS
[FreeTextEntry1] : RUSS BUCHANAN is a 71 year old male with hyperlipidemia, COPD, PAD s/p stenting, tobacco use, lung cancer (in remission; s/p chemo + XRT in 2018), coronary artery disease (s/p mLAD SHANTHI), LBBB and mixed cardiomyopathy with NYHA IIC HFrEF (LVEF: 25-30%) now s/p MDT CRT-D (8/27/2020) who presents for follow up.\par \par To summarize his history, he was first evaluated by Dr. Benito in 11/2019 after being found to have a LBBB on his ECG. At the time his wife noted that he had dyspnea when walking up stairs. He underwent TTE and nuclear stress testing which demonstrated depressed LVEF of 20-25%. He underwent cardiac catheterization which revealed 80% mLAD disease which was treated with a SHANTHI. He was initiated on GDMT and despite over 3 months of GDMT, his LVEF has remained low at 25-30%. Given his NYHA IIC HFrEF and wide LBBB (160 msec) he underwent MDT CRT-D implantation on 8/27/2020.\par \par In follow up, his LVEF was found to have improved to moderately reduced function by TTE in 11/2020. Interrogation of device in follow up however showed suboptimal  percentages with effective  of 87-93.3%. He wore a 48 hour monitor during which he had 513 unifocal PVCs, equating to about 12,000 PVCs.\par \par Today, his wife states that his breathing has been mediocre. She feels that it is better when he is not smoking. He does complain of left chest jumping at times, especially in certain positions when he sleeps. He denies having any significant palpitations. Denies having any dizziness, lightheadedness, syncope, chest pain/pressure.

## 2021-04-27 NOTE — CARDIOLOGY SUMMARY
[de-identified] : \par 04/27/2021: NSR with BiV Pacing. PVCs (LBLI axis, aVR/aVL discordance, V3-V4 transition) suggestive of RVOT exit (possibly para-Hisian).\par 07/16/2020: NSR with LBBB (QRSd 160 msec) [de-identified] : \par 03/2021 48 HR Holter: Unifocal PVCs 513/hr (12,312 PVCs).\par 05/2020 Event Monitor for 12 days: NSR. Frequent PVCs of 28%. Short runs of NSVT.\par  [de-identified] : \par 1/2020 Nuclear Stress Test: Inferior/inferoseptal infarct. LVEF: 22%. No ischemia.\par  [de-identified] : \par 11/2020 TTE: LVEF moderately decreased. LA normal (LAd 3.1 cm). RA normal. Mild-moderate TR.\par 06/2020 TTE: Severe LV dysfunction. LVEF: 22%. Grade I diastolic dysfunction. Mild TR. Mild RA enlargement.\par 01/2020 TTE: LVEF: 25-30%. Grade I diastolic dysfunction. Mild TR. Normal RV/LA/RA.\par  [de-identified] : \par 8/27/2020: MDT CRT-D implantation.\par Interrogation 4/27/2021: Phrenic capture with LV. Reprogrammed to LV4-coil which has no LV capture. 2 NSVT episodes. Otherwise normal lead parameters.\par  [de-identified] : \par 2/2020: LM normal. mLAD 80% s/p MARÍA 3.5 x 15 mm stent. LCx normal. pRCA 30%. mRCA 50%.\par

## 2021-04-27 NOTE — PHYSICAL EXAM
[Well Developed] : well developed [Well Nourished] : well nourished [No Acute Distress] : no acute distress [No Edema] : no edema [No Cyanosis] : no cyanosis [No Clubbing] : no clubbing [No Rash] : no rash [No Skin Lesions] : no skin lesions [Alert and Oriented] : alert and oriented [Normal memory] : normal memory

## 2021-05-12 ENCOUNTER — NON-APPOINTMENT (OUTPATIENT)
Age: 71
End: 2021-05-12

## 2021-05-12 ENCOUNTER — APPOINTMENT (OUTPATIENT)
Dept: CARDIOLOGY | Facility: CLINIC | Age: 71
End: 2021-05-12
Payer: COMMERCIAL

## 2021-05-12 VITALS
RESPIRATION RATE: 16 BRPM | HEIGHT: 72 IN | WEIGHT: 177 LBS | TEMPERATURE: 97.3 F | BODY MASS INDEX: 23.98 KG/M2 | HEART RATE: 80 BPM

## 2021-05-12 VITALS — SYSTOLIC BLOOD PRESSURE: 70 MMHG | DIASTOLIC BLOOD PRESSURE: 40 MMHG

## 2021-05-12 DIAGNOSIS — I95.9 HYPOTENSION, UNSPECIFIED: ICD-10-CM

## 2021-05-12 PROCEDURE — 99215 OFFICE O/P EST HI 40 MIN: CPT

## 2021-05-12 PROCEDURE — 93000 ELECTROCARDIOGRAM COMPLETE: CPT

## 2021-05-12 PROCEDURE — 99072 ADDL SUPL MATRL&STAF TM PHE: CPT

## 2021-06-01 ENCOUNTER — APPOINTMENT (OUTPATIENT)
Dept: ELECTROPHYSIOLOGY | Facility: CLINIC | Age: 71
End: 2021-06-01

## 2021-06-18 NOTE — ADDENDUM
[FreeTextEntry1] : 6/18/2021: Patient CV stable at moderate risk for surgery under general anesthesia. Has low baseline BP due to medications and CMP. Recommend holding his entresto morning of the surgery and restarting post-operatively.

## 2021-06-18 NOTE — ASSESSMENT
[FreeTextEntry1] : ECG: SR, atrial sensed, BiV paced (no change from prior) \par \par \par Na 139 K 4.5 Cl 106 CO2 26 BUN 35 Creat 1.9 (1/2021)\par  HDL 30 LDL 98  (11/2019) \par \par \par Interrogation 3/25/2021:\par 1. Normal function, BiV paced 82%\par 2. 1 minute run PAF\par \par ECHO 11/2020:\par 1. Moderate LV systolic dysfunction, EF 35-40%\par 2. Grade I diastolic dysfunction\par 3. Low normal RV function\par 4. Normal LA/RA\par 5. Mild to moderate TR, RVSP 31mmHg\par \par ECHO 6/2020:\par 1. TDS\par 2. Severe LV dysfunction, EF 25-30%\par 3. Grade I diastolic dysfunction\par 4. Mild TR\par 5. Mild OLLIE\par \par EVENT MONITOR 5/2020:\par 1. 12 days monitored\par 2. SR\par 3. Frequent PVCs, 28% burden\par 4. Short runs NSVT\par \par [facility] 2/2020:\par 1. LM normal\par 2. mLAD 80% stenosis, s/p MARÍA 3.5x15mm stent \par 3. LCX normal\par 4. pRCA 30% stenosis\par 5. mRCA 50% stenosis\par \par CT Chest 2018:  Scattered aortic calcifications and mural thrombus \par \par ECHO 1/2020:\par 1. Severe LV dysfunction, EF 20-25% with severe inferior/inferolateral hypo\par 2. Normal RV/LA/RA\par 3. Mild TR\par \par AORTIC SCAN\par 1. AAA with stent and mural thrombus\par \par NUCLEAR STRESS TEST 1/2020:\par 1.  INferior/inferoseptal infarct with EF 22%\par 2. No ischemia\par \par LE ARTERIAL DUPLEX\par 1. MOderate plaque\par 2. No hemodynamically significant stenosis\par \par EMILEE/PVR\par 1. R 0.86\par 2. L 0.82\par 3. PVR suggestive of inflow disease\par \par

## 2021-06-18 NOTE — HISTORY OF PRESENT ILLNESS
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 Struggles with being able to quit smoking. On disability for lung cancer, now in remission. Had chemotherapy and under control he states.  Was noted to be having increasing STEVENSON, noted by his wife in fall 2019.. Had echo and stress testing, found to have severe CMP and coreg/entresto started. Underwent cath as well and  s/p LAD stent 2/2020. Underwent CRT-D subsequently due to no improvement in EF.did have post-procedural hypotension but has improved. \par \par More recently has  undergone orthopedic surgery, right hip. Tolerated well, walks with cane now. No new CV complaints. Patient denies PND/orthopnea/edema/palpitations/syncope/claudication. No CP/SOB. Quit smoking few months ago. Seen by EP recently due to inadequate % BiV pacing and frequent PVCs. Started on Amiodarone by Dr. Peters end of April after discussion regarding meds vs ablation, patient preferred medication. \par \par HAs been walking slowly with cane since hip replacement surgery. No CP/SOB. Patient denies PND/orthopnea/edema/palpitations/syncope/claudication. No dizziness or lightheadedness. 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

## 2021-06-18 NOTE — DISCUSSION/SUMMARY
[FreeTextEntry1] : Patient is a 72 yo M with tobacco dependence, HLD, COPD, aortic atherosclerosis, AAA with mural thrombus,  PAD, CAD s/p PCI LAD 2/2020, LBBB, mixed CMP,  CRT-D fall 2020 here for cardiac follow up.  -Testing shows severe CMP that is multifactorial, does have CAD and s/p PCI but no improvement in CMP. Maybe related to prior EtOH and substance abuse years ago, possibly genetic. No recent infection \par -Echo does show some improvement in LV function fall 2020, although TDS. \par -Oniy 82% BiV pacing and noted to have frequent PVCs (28% burden on monitor)  likely contributing\par -Seen by EP, patient not amenable to ablation and agreed on plan for Amiodarone which he started\par -CRT check 4/2021 by Dr. Peters shows BiV pacing 90.5%\par -Device settings changed by EP as well, reprogrammed coils as LV4 had no phrenic capture\par -BP low but remains asx and chronically low, continue with increased fluid intake \par \par 1. On good med regimen, no changes at this time, EF has improved. BP low but will continue to monitor, \par 2. VAscular follow up, has to reschedule appointment\par 3. DAPT and statin, med management of CAD. \par 4. Remain unable to add aldactone at this time given low BP and runs dry \par 5. Continue to monitor for PAF, if recurrence will need to re-assess A/C\par 6. Continue amio, regular mointoring of TFT/LFT/PFT and annual eye exam. Will re-evaluate pacing in 2 months and echo for LV function. HAs EP follow up as well. BW prior to that visit\par \par

## 2021-06-23 ENCOUNTER — APPOINTMENT (OUTPATIENT)
Dept: CARDIOLOGY | Facility: CLINIC | Age: 71
End: 2021-06-23
Payer: COMMERCIAL

## 2021-06-23 PROCEDURE — 93306 TTE W/DOPPLER COMPLETE: CPT

## 2021-06-23 PROCEDURE — 99072 ADDL SUPL MATRL&STAF TM PHE: CPT

## 2021-06-25 ENCOUNTER — NON-APPOINTMENT (OUTPATIENT)
Age: 71
End: 2021-06-25

## 2021-06-25 ENCOUNTER — APPOINTMENT (OUTPATIENT)
Dept: CARDIOLOGY | Facility: CLINIC | Age: 71
End: 2021-06-25
Payer: COMMERCIAL

## 2021-06-25 VITALS
BODY MASS INDEX: 23.98 KG/M2 | HEART RATE: 84 BPM | WEIGHT: 177 LBS | RESPIRATION RATE: 16 BRPM | OXYGEN SATURATION: 100 % | SYSTOLIC BLOOD PRESSURE: 84 MMHG | HEIGHT: 72 IN | DIASTOLIC BLOOD PRESSURE: 62 MMHG

## 2021-06-25 PROCEDURE — 99214 OFFICE O/P EST MOD 30 MIN: CPT

## 2021-06-25 PROCEDURE — 93000 ELECTROCARDIOGRAM COMPLETE: CPT

## 2021-06-25 PROCEDURE — 99072 ADDL SUPL MATRL&STAF TM PHE: CPT

## 2021-06-25 NOTE — DISCUSSION/SUMMARY
[FreeTextEntry1] : Patient is a 72 yo M with tobacco dependence, HLD, COPD, aortic atherosclerosis, AAA with mural thrombus,  PAD, CAD s/p PCI LAD 2/2020, LBBB, mixed CMP,  CRT-D fall 2020 here for cardiac follow up.  \par \par -Testing shows severe CMP that is multifactorial, does have CAD and s/p PCI but no improvement in CMP. Maybe related to prior EtOH and substance abuse years ago, possibly genetic. No recent infection \par -Echo 6/2021 with significant improvement in LV function, EF 50-55%. Likely responding better to CRT with more frequent pacing \par -Has been on amio to suprress frequent PVCs that was leading to lower % of BiV pacing\par -Device settings changed by EP as well, reprogrammed coils as LV4 had no phrenic capture\par \par -BP low but remains asx and chronically low, continue with increased fluid intake \par \par 1. On good med regimen, no changes at this time, EF has improved. BP low but will continue to monitor, \par 2. Acceptable moderate CV risk for low risk procedure. REcommend he hold his entresto day of surgery to avoid hypotension. \par 3. DAPT and statin, med management of CAD. \par 4.  . Continue to monitor for PAF, if recurrence will need to re-assess A/C\par 5. Continue amio, regular mointoring of TFT/LFT/PFT and annual eye exam. HAd EP follow up next month and will be checking device.  \par 6. Follow up 3 months, TSH/LFTS then \par \par

## 2021-06-25 NOTE — ASSESSMENT
[FreeTextEntry1] : ECG: SR, atrial sensed, BiV paced (no change from prior) \par \par BUN 27 Creat 1.6 (3/2021) \par Na 139 K 4.5 Cl 106 CO2 26 BUN 35 Creat 1.9 (1/2021)\par \par  HDL 34 LDL 61  (3/2021) \par BNP 1961 \par  HDL 30 LDL 98  (11/2019) \par \par \par Interrogation 3/25/2021:\par 1. Normal function, BiV paced 82%\par 2. 1 minute run PAF\par \par ECHO 6/2021:\par 1. TDS/TLS, definity uses \par 2. Low normal LV function, EF 50-55%\par 3. Grade I diastolic dysfunction\par 4. RVE with low normal function\par \par \par ECHO 11/2020:\par 1. Moderate LV systolic dysfunction, EF 35-40%\par 2. Grade I diastolic dysfunction\par 3. Low normal RV function\par 4. Normal LA/RA\par 5. Mild to moderate TR, RVSP 31mmHg\par \par ECHO 6/2020:\par 1. TDS\par 2. Severe LV dysfunction, EF 25-30%\par 3. Grade I diastolic dysfunction\par 4. Mild TR\par 5. Mild OLLIE\par \par EVENT MONITOR 5/2020:\par 1. 12 days monitored\par 2. SR\par 3. Frequent PVCs, 28% burden\par 4. Short runs NSVT\par \par [facility] 2/2020:\par 1. LM normal\par 2. mLAD 80% stenosis, s/p MARÍA 3.5x15mm stent \par 3. LCX normal\par 4. pRCA 30% stenosis\par 5. mRCA 50% stenosis\par \par CT Chest 2018:  Scattered aortic calcifications and mural thrombus \par \par ECHO 1/2020:\par 1. Severe LV dysfunction, EF 20-25% with severe inferior/inferolateral hypo\par 2. Normal RV/LA/RA\par 3. Mild TR\par \par AORTIC SCAN\par 1. AAA with stent and mural thrombus\par \par NUCLEAR STRESS TEST 1/2020:\par 1.  INferior/inferoseptal infarct with EF 22%\par 2. No ischemia\par \par LE ARTERIAL DUPLEX\par 1. MOderate plaque\par 2. No hemodynamically significant stenosis\par \par EMILEE/PVR\par 1. R 0.86\par 2. L 0.82\par 3. PVR suggestive of inflow disease\par \par

## 2021-06-25 NOTE — HISTORY OF PRESENT ILLNESS
[FreeTextEntry1] : Patient is a 70 yo M with tobacco dependence,  HLD, COPD, PAD s/p PCI, mixed CMP s/p CRT-D 8/2020 here for follow up. Struggles with being able to quit smoking. On disability for lung cancer, now in remission. Had chemotherapy and under control he states.  Was noted to be having increasing STEVENSON, noted by his wife in fall 2019.. Had echo and stress testing, found to have severe CMP and coreg/entresto started. Underwent cath as well and  s/p LAD stent 2/2020. Underwent CRT-D subsequently due to no improvement in EF\par \par More recently has  undergone orthopedic surgery, right hip. Tolerated well, walks well now. Now in need of bladder procedure to excise recurrent tumor.  No CP/SOB. Patient denies PND/orthopnea/edema/palpitations/syncope/claudication. No dizziness or lightheadedness. 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

## 2021-07-16 ENCOUNTER — APPOINTMENT (OUTPATIENT)
Dept: CARDIOLOGY | Facility: CLINIC | Age: 71
End: 2021-07-16
Payer: COMMERCIAL

## 2021-07-16 PROCEDURE — 93289 INTERROG DEVICE EVAL HEART: CPT

## 2021-07-16 PROCEDURE — 99072 ADDL SUPL MATRL&STAF TM PHE: CPT

## 2021-07-27 ENCOUNTER — NON-APPOINTMENT (OUTPATIENT)
Age: 71
End: 2021-07-27

## 2021-07-27 ENCOUNTER — APPOINTMENT (OUTPATIENT)
Dept: ELECTROPHYSIOLOGY | Facility: CLINIC | Age: 71
End: 2021-07-27
Payer: COMMERCIAL

## 2021-07-27 VITALS
HEIGHT: 72 IN | SYSTOLIC BLOOD PRESSURE: 84 MMHG | DIASTOLIC BLOOD PRESSURE: 51 MMHG | TEMPERATURE: 97.4 F | WEIGHT: 170 LBS | OXYGEN SATURATION: 98 % | HEART RATE: 65 BPM | BODY MASS INDEX: 23.03 KG/M2

## 2021-07-27 VITALS — SYSTOLIC BLOOD PRESSURE: 84 MMHG | DIASTOLIC BLOOD PRESSURE: 50 MMHG

## 2021-07-27 PROCEDURE — 93000 ELECTROCARDIOGRAM COMPLETE: CPT | Mod: 59

## 2021-07-27 PROCEDURE — 93284 PRGRMG EVAL IMPLANTABLE DFB: CPT | Mod: 59

## 2021-07-27 PROCEDURE — 99215 OFFICE O/P EST HI 40 MIN: CPT | Mod: 25

## 2021-07-27 RX ORDER — AMIODARONE HYDROCHLORIDE 200 MG/1
200 TABLET ORAL DAILY
Qty: 90 | Refills: 2 | Status: DISCONTINUED | COMMUNITY
Start: 2021-04-27 | End: 2021-07-27

## 2021-07-27 RX ORDER — ASPIRIN ENTERIC COATED TABLETS 81 MG 81 MG/1
81 TABLET, DELAYED RELEASE ORAL DAILY
Refills: 0 | Status: ACTIVE | COMMUNITY

## 2021-07-27 NOTE — CARDIOLOGY SUMMARY
[de-identified] : \par 07/27/2021: AS-BiV paced. No PVCs.\par 04/27/2021: NSR with BiV Pacing. PVCs (LBLI axis, aVR/aVL discordance, V3-V4 transition) suggestive of RVOT exit (possibly para-Hisian).\par 07/16/2020: NSR with LBBB (QRSd 160 msec) \par  [de-identified] : \par 03/2021 48 HR Holter: Unifocal PVCs 513/hr (12,312 PVCs).\par 05/2020 Event Monitor for 12 days: NSR. Frequent PVCs of 28%. Short runs of NSVT.\par  [de-identified] : \par 1/2020 Nuclear Stress Test: Inferior/inferoseptal infarct. LVEF: 22%. No ischemia.\par  [de-identified] : \par 06/23/2021 TTE: LVEF: 50-55%. RV mildly enlarged. PASP 32.4 mmHg.\par 11/2020 TTE: LVEF moderately decreased. LA normal (LAd 3.1 cm). RA normal. Mild-moderate TR.\par 06/2020 TTE: Severe LV dysfunction. LVEF: 22%. Grade I diastolic dysfunction. Mild TR. Mild RA enlargement.\par 01/2020 TTE: LVEF: 25-30%. Grade I diastolic dysfunction. Mild TR. Normal RV/LA/RA.\par  [de-identified] : \par 8/27/2020: MDT CRT-D implantation.\par Interrogation 7/27/2021: Normal parameters. Phrenic capture LV1-Coil down to 0.75V@0.4ms\par  [de-identified] : \par 2/2020: LM normal. mLAD 80% s/p MARÍA 3.5 x 15 mm stent. LCx normal. pRCA 30%. mRCA 50%.\par

## 2021-07-27 NOTE — DISCUSSION/SUMMARY
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 His PVCs appeared to have a para-Hisian exit. I feel that given his young age and now balance difficulties, long term amiodarone would be a poor solution. He and his wife agreed with that. I believe that definitive therapy with catheter ablation would benefit him most. I did discuss that there is an increased risk of complete heart block and subsequent need to be dependent on his device after the ablation. We also discussed the risk of stroke and perforation (with potential need for open heart surgery). After our discussion, they agreed with the plan for ablation.\par \par In regards to smoking, I counseled him and encouraged him to pursue smoking cessation.\par \par Regarding his CRT-D settings, I changed his LV vector to LV2-3 with a fixed output of 2.0V@0.5ms (phrenic threshold around 2.25/2.5V) to help extend battery life. ECG shows excellent BiV pacing.\par \par Recommendations:\par - Discontinue amiodarone\par - RTC in 6 weeks\par - PVC ablation if PVCs return\par - Smoking cessation\par - CRT-D \par \par Kaleb Peters MD, FACC, RS\par Clinical Cardiac Electrophysiology

## 2021-07-27 NOTE — HISTORY OF PRESENT ILLNESS
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 He presents with his wife (Anca).\par \par To summarize his history, he was first evaluated by Dr. Benito in 11/2019 after being found to have a LBBB on his ECG. At the time his wife noted that he had dyspnea when walking up stairs. He underwent TTE and nuclear stress testing which demonstrated depressed LVEF of 20-25%. He underwent cardiac catheterization which revealed 80% mLAD disease which was treated with a SHANTHI. He was initiated on GDMT and despite over 3 months of GDMT, his LVEF has remained low at 25-30%. Given his NYHA IIC HFrEF and wide LBBB (160 msec) he underwent MDT CRT-D implantation on 8/27/2020.\par \par In follow up, his LVEF was found to have improved to moderately reduced function by TTE in 11/2020. Interrogation of device in follow up however showed suboptimal  percentages with effective  of 87-93.3%. He wore a 48 hour monitor during which he had 513 unifocal PVCs, equating to about 12,000 PVCs. Options for management were discussed including catheter ablation vs amiodarone therapy. He preferred to avoid invasive procedures so was started on amiodarone.\par \par After initiation of amiodarone, his PVC burden has declined and his effective  hakeem to 98.3%. This was associated with an improvement in LVEF to 50-55%.\par \par He was diagnosed with recurrent bladder cancer in 6/2021 and is now s/p surgical excision (Dr. West). He does not need any additional chemotherapy. He is scheduled for 6 week follow up. \par \par Today, he reports that he continues to have dyspnea on exertion. He continues to smoke (about 1 PPD). He has weak legs and has fallen 3 times this past month. He notes that he has been feeling off balance. He denies having any peripheral edema, orthopnea, PND, palpitations or syncope.

## 2021-07-27 NOTE — REASON FOR VISIT
[Arrhythmia/ECG Abnorrmalities] : arrhythmia/ECG abnormalities [Other: ____] : [unfilled] [FreeTextEntry3] : Dr. Benito

## 2021-07-27 NOTE — PHYSICAL EXAM
[Well Developed] : well developed [Well Nourished] : well nourished [No Acute Distress] : no acute distress [No Respiratory Distress] : no respiratory distress  [No Edema] : no edema [Moves all extremities] : moves all extremities [No Focal Deficits] : no focal deficits [Alert and Oriented] : alert and oriented

## 2021-08-18 ENCOUNTER — RX RENEWAL (OUTPATIENT)
Age: 71
End: 2021-08-18

## 2021-08-31 ENCOUNTER — APPOINTMENT (OUTPATIENT)
Dept: ELECTROPHYSIOLOGY | Facility: CLINIC | Age: 71
End: 2021-08-31

## 2021-09-07 ENCOUNTER — APPOINTMENT (OUTPATIENT)
Dept: ELECTROPHYSIOLOGY | Facility: CLINIC | Age: 71
End: 2021-09-07

## 2021-09-09 NOTE — DISCHARGE NOTE PROVIDER - NSDCQMERRANDS_GEN_ALL_CORE
Chief Complaint   Patient presents with   • Follow-Up     last seen 5/18/21   • Results     CXR 5/19/21         HPI: This patient is a 81 y.o. male whom is followed in our clinic for L sided pleural effusion and ILD changes on CT last seen by me on 5/18/21. The patient's past medical history significant for hypertension currently well controlled, obstructive sleep apnea on CPAP therapy and followed in our sleep medicine clinic, dyslipidemia, hypothyroid, gastroesophageal reflux disease and seasonal allergies for which he uses intranasal steroids.  He is a lifelong non-smoker.The patient establish care with me at the end of January for left pleural effusion that was found to evaluate dry cough that started last fall.  When cough persisted and he began experiencing shortness of breath chest x-ray done on January 5 of this year showed a large left-sided pleural effusion with some loculation.  He was admitted for thoracentesis which showed lymphocytic predominant exudative effusion.  92% lymphocytes.  LDH and total protein were elevated consistent with exudate.  Cytology was negative and there was no growth on bacterial fungal or AFB cultures.  A repeat chest x-ray on January 14 showed recurrence of loculated left pleural effusion although not quite as severe.  He also had some bilateral reticular changes noted on CT chest done during his hospital stay consistent with early ILD.  Repeat thoracentesis  again showed lymphocytic predominant exudative effusion with cytology negative for malignancy.  Lymphocytes were 88%.  He had mild eosinophilia on last thoracentesis at 12% but not on first.    Due to findings of interstitial lung disease we ordered connective tissue disease screening at our last clinic visit which was unremarkable, specifically FAVIO negative, rheumatoid factor negative, CCP negative.  QuantiFERON gold was negative.  A repeat CT chest done with high-resolution protocol shows peripheral reticular thickening  bilateral upper and lower lobes with mild bronchiectasis but no clear honeycombing and only small amt of loculated fluid on L slightly less than prior. He was more SOB at our last visit and although repeat CXR did not appear to show reaccumulation, we planned to consider pleural biopsy but pt ulimately decided against this. He presents today for f/u. PFTs were ordered but not scheduled. He continues to have GARCIA but denies cough, CP, fever, weight loss or night sweats.     Past Medical History:   Diagnosis Date   • Arrhythmia     Atrial fribrillation   • Arthritis    • Bronchitis 2004   • Fibula fracture 1981    right   • High cholesterol    • Hyperlipidemia    • Hypertension    • MEDICAL HOME    • Onychogryposis of toenail 1/21/2021   • Onychomycosis 1/21/2021   • Pain     low back   • Pneumonia 2004   • Sleep apnea        Social History     Socioeconomic History   • Marital status:      Spouse name: Not on file   • Number of children: Not on file   • Years of education: Not on file   • Highest education level: Not on file   Occupational History   • Not on file   Tobacco Use   • Smoking status: Never Smoker   • Smokeless tobacco: Never Used   Vaping Use   • Vaping Use: Never used   Substance and Sexual Activity   • Alcohol use: Yes     Alcohol/week: 1.2 - 1.8 oz     Types: 2 - 3 Standard drinks or equivalent per week     Comment: occ   • Drug use: Never   • Sexual activity: Not Currently   Other Topics Concern   • Not on file   Social History Narrative   • Not on file     Social Determinants of Health     Financial Resource Strain:    • Difficulty of Paying Living Expenses:    Food Insecurity:    • Worried About Running Out of Food in the Last Year:    • Ran Out of Food in the Last Year:    Transportation Needs:    • Lack of Transportation (Medical):    • Lack of Transportation (Non-Medical):    Physical Activity:    • Days of Exercise per Week:    • Minutes of Exercise per Session:    Stress:    • Feeling of  Stress :    Social Connections:    • Frequency of Communication with Friends and Family:    • Frequency of Social Gatherings with Friends and Family:    • Attends Jew Services:    • Active Member of Clubs or Organizations:    • Attends Club or Organization Meetings:    • Marital Status:    Intimate Partner Violence:    • Fear of Current or Ex-Partner:    • Emotionally Abused:    • Physically Abused:    • Sexually Abused:        Family History   Problem Relation Age of Onset   • Cancer Mother         cervical/breast   • Cancer Sister         lung   • Cancer Father         lung cancer   • Cancer Other        Current Outpatient Medications on File Prior to Visit   Medication Sig Dispense Refill   • fluticasone (FLONASE) 50 MCG/ACT nasal spray SHAKE LIQUID AND USE 2 SPRAYS IN EACH NOSTRIL TWICE DAILY 48 g 1   • carvedilol (COREG) 25 MG Tab TAKE 1 TABLET BY MOUTH TWICE DAILY 180 tablet 1   • VITAMIN D PO Take  by mouth.     • lisinopril (PRINIVIL) 10 MG Tab Take 1 Tab by mouth every day. 100 Tab 1   • levothyroxine (SYNTHROID) 75 MCG Tab Take 1 Tab by mouth every morning. ON A EMPTY STOMACH 90 Tab 2   • pravastatin (PRAVACHOL) 40 MG tablet Take 1 Tab by mouth every day. 90 Tab 2   • omeprazole (PRILOSEC) 40 MG delayed-release capsule Take 1 Cap by mouth every day. 90 Cap 2   • aspirin 81 MG tablet Take 81 mg by mouth every bedtime.     • fluticasone (FLONASE) 50 MCG/ACT nasal spray 2 sprays each nostril twice daily. 16 g 2     No current facility-administered medications on file prior to visit.       Other environmental      ROS:   Review of Systems   Constitutional: Negative for chills, diaphoresis, fever, malaise/fatigue and weight loss.   HENT: Negative for congestion, ear discharge, ear pain, hearing loss, nosebleeds, sinus pain, sore throat and tinnitus.    Eyes: Negative for blurred vision, double vision, photophobia, pain, discharge and redness.   Respiratory: Positive for shortness of breath. Negative for  "cough, hemoptysis, sputum production, wheezing and stridor.    Cardiovascular: Negative for chest pain, palpitations, orthopnea, claudication, leg swelling and PND.   Gastrointestinal: Negative for abdominal pain, constipation, diarrhea, heartburn, nausea and vomiting.   Genitourinary: Negative for dysuria and urgency.   Musculoskeletal: Negative for back pain, falls, joint pain, myalgias and neck pain.   Skin: Negative for itching and rash.   Neurological: Negative for dizziness, tremors, speech change, focal weakness, weakness and headaches.   Endo/Heme/Allergies: Negative for environmental allergies.   Psychiatric/Behavioral: Negative for depression.       /60 (BP Location: Right arm, Patient Position: Sitting, BP Cuff Size: Large adult)   Pulse 67   Temp 36.3 °C (97.4 °F) (Temporal)   Resp 16   Ht 1.835 m (6' 0.24\")   Wt 125 kg (275 lb)   SpO2 92%   Physical Exam  Vitals reviewed.   Constitutional:       General: He is not in acute distress.     Appearance: Normal appearance. He is obese.   HENT:      Head: Normocephalic and atraumatic.      Right Ear: External ear normal.      Left Ear: External ear normal.      Nose: Nose normal. No congestion.      Mouth/Throat:      Mouth: Mucous membranes are moist.      Pharynx: Oropharynx is clear. No oropharyngeal exudate.   Eyes:      General: No scleral icterus.     Extraocular Movements: Extraocular movements intact.      Conjunctiva/sclera: Conjunctivae normal.      Pupils: Pupils are equal, round, and reactive to light.   Cardiovascular:      Rate and Rhythm: Normal rate and regular rhythm.      Heart sounds: Normal heart sounds. No murmur heard.   No gallop.    Pulmonary:      Effort: Pulmonary effort is normal. No respiratory distress.      Breath sounds: No wheezing.      Comments: Inspiratory crackles R lung base  Abdominal:      Palpations: Abdomen is soft.      Comments: obese   Skin:     General: Skin is warm and dry.      Findings: No rash. "   Neurological:      Mental Status: He is alert and oriented to person, place, and time.      Cranial Nerves: No cranial nerve deficit.   Psychiatric:         Mood and Affect: Mood normal.         Behavior: Behavior normal.         PFTs as reviewed by me personally: pending    Imagaing as reviewed by me personally:  As per HPI    Assessment:  1. Interstitial pulmonary disease (HCC)  CT-CHEST (THORAX) W/O   2. Pleural effusion on left     3. Class 2 obesity with body mass index (BMI) of 37.0 to 37.9 in adult, unspecified obesity type, unspecified whether serious comorbidity present     4. MARCI (obstructive sleep apnea)         Plan:  1. Age and gender the most likely etiology would be IPF but HRCT was not c/w this and pleural effusion would be rare in the setting of primary fibrotic lung disease. I am curious for sarcoid but hesitant to use high dose steroids. We will repeat CT given new crackles on exam and 6 mos from prior and obtain PFTs. Pending results, consider trial of immunosuppression  2. See above. Pt declined pleuray biopsy and no e/o significant reaccummulation. CT chest now.  3. This does put pt at risk for obesity related pulmonary complications. Encouraged healthy lifestyle habits.  4. Pt on CPAP and compliant with, benefiting from CPAP. Followed by sleep medicine.   Return in about 5 months (around 2/8/2022) for pfts, ct .       No

## 2021-09-28 ENCOUNTER — NON-APPOINTMENT (OUTPATIENT)
Age: 71
End: 2021-09-28

## 2021-09-28 ENCOUNTER — APPOINTMENT (OUTPATIENT)
Dept: CARDIOLOGY | Facility: CLINIC | Age: 71
End: 2021-09-28
Payer: COMMERCIAL

## 2021-09-28 VITALS
BODY MASS INDEX: 23.84 KG/M2 | WEIGHT: 176 LBS | HEART RATE: 79 BPM | HEIGHT: 72 IN | DIASTOLIC BLOOD PRESSURE: 62 MMHG | SYSTOLIC BLOOD PRESSURE: 90 MMHG | RESPIRATION RATE: 10 BRPM

## 2021-09-28 PROCEDURE — 93000 ELECTROCARDIOGRAM COMPLETE: CPT | Mod: 59

## 2021-09-28 PROCEDURE — 99214 OFFICE O/P EST MOD 30 MIN: CPT

## 2021-09-28 PROCEDURE — 93289 INTERROG DEVICE EVAL HEART: CPT

## 2021-09-28 RX ORDER — TAMSULOSIN HYDROCHLORIDE 0.4 MG/1
0.4 CAPSULE ORAL DAILY
Refills: 0 | Status: DISCONTINUED | COMMUNITY
End: 2021-09-28

## 2021-09-28 NOTE — HISTORY OF PRESENT ILLNESS
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 Struggles with being able to quit smoking. On disability for lung cancer, now in remission. Had chemotherapy and under control he states.  Was noted to be having increasing STEVENSON, noted by his wife in fall 2019.. Had echo and stress testing, found to have severe CMP and coreg/entresto started. Underwent cath as well and  s/p LAD stent 2/2020. Underwent CRT-D subsequently due to no improvement in EF\par \par More recently has  undergone orthopedic surgery, right hip. Tolerated well, walks well now. Also excision of recurrent bladder tumor.  No CP/SOB. Patient denies PND/orthopnea/edema/palpitations/syncope/claudication. No dizziness or lightheadedness. Has been only taking the entresto daily, feels less tired on this. Remains sedentary. 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 Lives with his wife\par  \par ROS: GI and  negative

## 2021-09-28 NOTE — ASSESSMENT
[FreeTextEntry1] : ECG: SR, atrial sensed, BiV paced, frequent PVCs \par \par BUN 27 Creat 1.6 (3/2021) \par Na 139 K 4.5 Cl 106 CO2 26 BUN 35 Creat 1.9 (1/2021)\par \par  HDL 34 LDL 61  (3/2021) \par BNP 1961 \par  HDL 30 LDL 98  (11/2019) \par \par \par Interrogation 3/25/2021:\par 1. Normal function, BiV paced 82%\par 2. 1 minute run PAF\par \par ECHO 6/2021:\par 1. TDS/TLS, definity used \par 2. Low normal LV function, EF 50-55%\par 3. Grade I diastolic dysfunction\par 4. RVE with low normal function\par \par \par ECHO 11/2020:\par 1. Moderate LV systolic dysfunction, EF 35-40%\par 2. Grade I diastolic dysfunction\par 3. Low normal RV function\par 4. Normal LA/RA\par 5. Mild to moderate TR, RVSP 31mmHg\par \par ECHO 6/2020:\par 1. TDS\par 2. Severe LV dysfunction, EF 25-30%\par 3. Grade I diastolic dysfunction\par 4. Mild TR\par 5. Mild OLLIE\par \par EVENT MONITOR 5/2020:\par 1. 12 days monitored\par 2. SR\par 3. Frequent PVCs, 28% burden\par 4. Short runs NSVT\par \par [facility] 2/2020:\par 1. LM normal\par 2. mLAD 80% stenosis, s/p MARÍA 3.5x15mm stent \par 3. LCX normal\par 4. pRCA 30% stenosis\par 5. mRCA 50% stenosis\par \par CT Chest 2018:  Scattered aortic calcifications and mural thrombus \par \par ECHO 1/2020:\par 1. Severe LV dysfunction, EF 20-25% with severe inferior/inferolateral hypo\par 2. Normal RV/LA/RA\par 3. Mild TR\par \par AORTIC SCAN\par 1. AAA with stent and mural thrombus\par \par NUCLEAR STRESS TEST 1/2020:\par 1.  INferior/inferoseptal infarct with EF 22%\par 2. No ischemia\par \par LE ARTERIAL DUPLEX\par 1. MOderate plaque\par 2. No hemodynamically significant stenosis\par \par EMILEE/PVR\par 1. R 0.86\par 2. L 0.82\par 3. PVR suggestive of inflow disease\par \par

## 2021-09-28 NOTE — DISCUSSION/SUMMARY
[FreeTextEntry1] : Patient is a 72 yo M with tobacco dependence, HLD, COPD, aortic atherosclerosis, AAA with mural thrombus,  PAD, CAD s/p PCI LAD 2/2020, LBBB, mixed CMP,  CRT-D fall 2020 here for cardiac follow up.  \par \par -Testing shows severe CMP that is multifactorial, does have CAD and s/p PCI but no improvement in CMP. Maybe related to prior EtOH and substance abuse years ago, possibly genetic. No myocardidits. Also significant PVCs and improved with Amio therapy due improvement in  % CRT pacing with PVC supression\par -Echo 6/2021 with significant improvement in LV function, EF 50-55%. \par -Device settings changed by EP as well recently, taken off amio and will consider ablation if PVCs recur\par -CRT check today shows 96% BiV pacing and no events\par -BP low but remains asx and chronically low, continue with increased fluid intake \par \par 1. On good med regimen, no changes at this time, EF has improved. BP low but will continue to monitor, \par 2. Change entresto to 49/51mg po bid, likely will tolerate better with less fatigue\par 3. DAPT and statin, med management of CAD. \par 4. Continue to monitor for PAF, if recurrence will need to re-assess A/C\par 5. EP follow up and ablation if recurrent PVCS, off Amio now. Does have frequent PVCs on ECG today\par 6. Follow up 3 months \par 7. Counselled for more than 3 minutes on smoking cessation \par 8. REcommend pulmonary follow up as well, likely COPD contributing to dyspnea reported by his wife\par 9. Will plan on nuclear stress testing in new year for surveillance of CAD, echo then as well

## 2021-09-28 NOTE — PHYSICAL EXAM
[General Appearance - Well Developed] : well developed [General Appearance - Well Nourished] : well nourished [Normal Conjunctiva] : the conjunctiva exhibited no abnormalities [Normal Oropharynx] : normal oropharynx [Normal Jugular Venous V Waves Present] : normal jugular venous V waves present [] : no respiratory distress [Respiration, Rhythm And Depth] : normal respiratory rhythm and effort [Heart Rate And Rhythm] : heart rate and rhythm were normal [Murmurs] : no murmurs present [Bowel Sounds] : normal bowel sounds [Abdomen Soft] : soft [Abnormal Walk] : normal gait [Nail Clubbing] : no clubbing of the fingernails [Cyanosis, Localized] : no localized cyanosis [Skin Color & Pigmentation] : normal skin color and pigmentation [Oriented To Time, Place, And Person] : oriented to person, place, and time [Affect] : the affect was normal [Edema] : no peripheral edema present [FreeTextEntry1] : no edema. DP 1= bilaterally, cap refill = 3 seconds bilaterally

## 2021-10-05 ENCOUNTER — NON-APPOINTMENT (OUTPATIENT)
Age: 71
End: 2021-10-05

## 2021-10-05 ENCOUNTER — APPOINTMENT (OUTPATIENT)
Dept: ELECTROPHYSIOLOGY | Facility: CLINIC | Age: 71
End: 2021-10-05
Payer: COMMERCIAL

## 2021-10-05 VITALS
SYSTOLIC BLOOD PRESSURE: 104 MMHG | DIASTOLIC BLOOD PRESSURE: 70 MMHG | HEIGHT: 72 IN | HEART RATE: 53 BPM | BODY MASS INDEX: 24.11 KG/M2 | RESPIRATION RATE: 21 BRPM | WEIGHT: 178 LBS | TEMPERATURE: 98 F | OXYGEN SATURATION: 97 %

## 2021-10-05 PROCEDURE — 99215 OFFICE O/P EST HI 40 MIN: CPT

## 2021-10-05 PROCEDURE — 93284 PRGRMG EVAL IMPLANTABLE DFB: CPT

## 2021-10-05 PROCEDURE — 93000 ELECTROCARDIOGRAM COMPLETE: CPT | Mod: 59

## 2021-10-06 ENCOUNTER — RESULT CHARGE (OUTPATIENT)
Age: 71
End: 2021-10-06

## 2021-10-07 NOTE — HISTORY OF PRESENT ILLNESS
[FreeTextEntry1] : RUSS BUCHANAN is a 71 year old male with hyperlipidemia, COPD, PAD s/p stenting, tobacco use, lung cancer (in remission; s/p chemo + XRT in 2018), bladder cancer (2009 s/p BCG instillation with recurrence in 2021), coronary artery disease (s/p mLAD SHANTHI), frequent PVCs (previously on amiodarone), LBBB and mixed cardiomyopathy with NYHA IIC HFrEF (LVEF: 25-30%) s/p MDT CRT-D (8/27/2020) who presents for follow up. He presents with his wife (Anca).\par \par To summarize his history, he was first evaluated by Dr. Benito in 11/2019 after being found to have a LBBB on his ECG. At the time his wife noted that he had dyspnea when walking up stairs. He underwent TTE and nuclear stress testing which demonstrated depressed LVEF of 20-25%. He underwent cardiac catheterization which revealed 80% mLAD disease which was treated with a SHANTHI. He was initiated on GDMT and despite over 3 months of GDMT, his LVEF has remained low at 25-30%. Given his NYHA IIC HFrEF and wide LBBB (160 msec) he underwent MDT CRT-D implantation on 8/27/2020.\par \par In follow up, his LVEF was found to have improved to moderately reduced function by TTE in 11/2020. Interrogation of device in follow up however showed suboptimal  percentages with effective  of 87-93.3%. He wore a 48 hour monitor during which he had 513 unifocal PVCs, equating to about 12,000 PVCs. Options for management were discussed including catheter ablation vs amiodarone therapy. He preferred to avoid invasive procedures so was started on amiodarone.\par \par After initiation of amiodarone, his PVC burden has declined and his effective  hakeem to 98.3%. This was associated with an improvement in LVEF to 50-55%.\par \par He was diagnosed with recurrent bladder cancer in 6/2021 and is now s/p surgical excision (Dr. West). He does not need any additional chemotherapy. He is scheduled for 6 week follow up.\par \par At his follow up on 7/27/2021, he was found to have improvement in EF but he had symptoms of balance difficulties so amiodarone was discontinued. Catheter ablation was discussed but not pursued at the time due to the fact that PVCs had resolved.\par \par Today, his wife states that he continues to be fatigued although the patient feels he is fine. He continues to smoke.

## 2021-10-07 NOTE — DISCUSSION/SUMMARY
[FreeTextEntry1] : RUSS BUCHANAN is a 71 year old male with hyperlipidemia, COPD, PAD s/p stenting, tobacco use, lung cancer (in remission; s/p chemo + XRT in 2018), bladder cancer (s/p BCG therapy in 2009, resection in 7/2021), coronary artery disease (s/p mLAD SHANTHI), frequent PVCs (on amiodarone), LBBB and mixed cardiomyopathy with NYHA IIC HFrEF with reverse remodeling (LVEF: 25-30% > 50-55% 7/2021) s/p MDT CRT-D (8/27/2020) who presents for follow up.\par \par After cessation of amiodarone he has had increasing number of PVCs which have affected his overall BiV pacing percentage. Additionally, with PVC reduction, he had normalization of EF. Therefore, I recommend that we pursue EP study and cahteter ablation. All risks, benefits and alternatives were reviewed including the risk of perforation, stroke, heart attack, valvular injury, lead dislodgement, complete heart block (especially since PVCs appear to be para-Hisian), or death. After our discussion, he agreed to proceed with ablation.\par \par Recommendations:\par - PVC ablation\par - He does not need to hold any medications before ablation\par \par Kaleb Peters MD, FACC, RS\par Clinical Cardiac Electrophysiology

## 2021-10-07 NOTE — PHYSICAL EXAM
[Well Developed] : well developed [Well Nourished] : well nourished [No Acute Distress] : no acute distress [No Respiratory Distress] : no respiratory distress  [No Edema] : no edema [Moves all extremities] : moves all extremities [No Focal Deficits] : no focal deficits

## 2021-10-07 NOTE — CARDIOLOGY SUMMARY
[de-identified] : \par 10/05/2021: AS-BiV paced with PVCs.\par 07/27/2021: AS-BiV paced. No PVCs.\par 04/27/2021: NSR with BiV Pacing. PVCs (LBLI axis, aVR/aVL discordance, V3-V4 transition) suggestive of RVOT exit (possibly para-Hisian).\par 07/16/2020: NSR with LBBB (QRSd 160 msec)\par  [de-identified] : \par 03/2021 48 HR Holter: Unifocal PVCs 513/hr (12,312 PVCs).\par 05/2020 Event Monitor for 12 days: NSR. Frequent PVCs of 28%. Short runs of NSVT.\par  [de-identified] : \par 1/2020 Nuclear Stress Test: Inferior/inferoseptal infarct. LVEF: 22%. No ischemia.\par  [de-identified] : \par 06/23/2021 TTE: LVEF: 50-55%. RV mildly enlarged. PASP 32.4 mmHg.\par 11/2020 TTE: LVEF moderately decreased. LA normal (LAd 3.1 cm). RA normal. Mild-moderate TR.\par 06/2020 TTE: Severe LV dysfunction. LVEF: 22%. Grade I diastolic dysfunction. Mild TR. Mild RA enlargement.\par 01/2020 TTE: LVEF: 25-30%. Grade I diastolic dysfunction. Mild TR. Normal RV/LA/RA.\par  [de-identified] : \par 8/27/2020: MDT CRT-D implantation.\par Interrogation 10/5/2021: Normal device function. 91% CRT pacing, down from higher percentage before.\par  [de-identified] : \par 2/2020: LM normal. mLAD 80% s/p MARÍA 3.5 x 15 mm stent. LCx normal. pRCA 30%. mRCA 50%.\par

## 2021-10-17 ENCOUNTER — APPOINTMENT (OUTPATIENT)
Dept: DISASTER EMERGENCY | Facility: CLINIC | Age: 71
End: 2021-10-17

## 2021-10-18 LAB — SARS-COV-2 N GENE NPH QL NAA+PROBE: NOT DETECTED

## 2021-10-20 ENCOUNTER — TRANSCRIPTION ENCOUNTER (OUTPATIENT)
Age: 71
End: 2021-10-20

## 2021-10-20 ENCOUNTER — OUTPATIENT (OUTPATIENT)
Dept: OUTPATIENT SERVICES | Facility: HOSPITAL | Age: 71
LOS: 1 days | End: 2021-10-20
Payer: MEDICARE

## 2021-10-20 DIAGNOSIS — Z90.89 ACQUIRED ABSENCE OF OTHER ORGANS: Chronic | ICD-10-CM

## 2021-10-20 DIAGNOSIS — I73.9 PERIPHERAL VASCULAR DISEASE, UNSPECIFIED: Chronic | ICD-10-CM

## 2021-10-20 DIAGNOSIS — Z98.890 OTHER SPECIFIED POSTPROCEDURAL STATES: Chronic | ICD-10-CM

## 2021-10-20 DIAGNOSIS — Z95.5 PRESENCE OF CORONARY ANGIOPLASTY IMPLANT AND GRAFT: Chronic | ICD-10-CM

## 2021-10-20 DIAGNOSIS — Z95.810 PRESENCE OF AUTOMATIC (IMPLANTABLE) CARDIAC DEFIBRILLATOR: Chronic | ICD-10-CM

## 2021-10-20 PROCEDURE — 36415 COLL VENOUS BLD VENIPUNCTURE: CPT

## 2021-10-20 PROCEDURE — 86900 BLOOD TYPING SEROLOGIC ABO: CPT

## 2021-10-20 PROCEDURE — 93655 ICAR CATH ABLTJ DSCRT ARRHYT: CPT

## 2021-10-20 PROCEDURE — C1766: CPT

## 2021-10-20 PROCEDURE — 85610 PROTHROMBIN TIME: CPT

## 2021-10-20 PROCEDURE — C1894: CPT

## 2021-10-20 PROCEDURE — C1732: CPT

## 2021-10-20 PROCEDURE — 93654 COMPRE EP EVAL TX VT: CPT

## 2021-10-20 PROCEDURE — 85027 COMPLETE CBC AUTOMATED: CPT

## 2021-10-20 PROCEDURE — 85730 THROMBOPLASTIN TIME PARTIAL: CPT

## 2021-10-20 PROCEDURE — 80048 BASIC METABOLIC PNL TOTAL CA: CPT

## 2021-10-20 PROCEDURE — C1760: CPT

## 2021-10-20 PROCEDURE — 83735 ASSAY OF MAGNESIUM: CPT

## 2021-10-20 PROCEDURE — C1730: CPT

## 2021-10-20 PROCEDURE — 93005 ELECTROCARDIOGRAM TRACING: CPT

## 2021-10-20 PROCEDURE — 86850 RBC ANTIBODY SCREEN: CPT

## 2021-10-20 PROCEDURE — 86901 BLOOD TYPING SEROLOGIC RH(D): CPT

## 2021-10-20 PROCEDURE — 93662 INTRACARDIAC ECG (ICE): CPT

## 2021-10-20 PROCEDURE — 93662 INTRACARDIAC ECG (ICE): CPT | Mod: 26

## 2021-10-20 RX ORDER — ATORVASTATIN CALCIUM 80 MG/1
1 TABLET, FILM COATED ORAL
Qty: 0 | Refills: 0 | DISCHARGE

## 2021-10-20 RX ORDER — CARVEDILOL PHOSPHATE 80 MG/1
1 CAPSULE, EXTENDED RELEASE ORAL
Qty: 0 | Refills: 0 | DISCHARGE

## 2021-10-20 RX ORDER — CLONAZEPAM 1 MG
2 TABLET ORAL
Qty: 0 | Refills: 0 | DISCHARGE

## 2021-10-26 DIAGNOSIS — I42.9 CARDIOMYOPATHY, UNSPECIFIED: ICD-10-CM

## 2021-10-26 PROBLEM — E78.5 HYPERLIPIDEMIA, UNSPECIFIED: Chronic | Status: ACTIVE | Noted: 2021-10-20

## 2021-10-26 PROBLEM — I50.20 UNSPECIFIED SYSTOLIC (CONGESTIVE) HEART FAILURE: Chronic | Status: ACTIVE | Noted: 2020-08-27

## 2021-10-26 PROBLEM — C67.9 MALIGNANT NEOPLASM OF BLADDER, UNSPECIFIED: Chronic | Status: ACTIVE | Noted: 2017-09-11

## 2021-10-26 PROBLEM — I49.3 VENTRICULAR PREMATURE DEPOLARIZATION: Chronic | Status: ACTIVE | Noted: 2021-10-20

## 2021-10-26 PROBLEM — Z86.79 PERSONAL HISTORY OF OTHER DISEASES OF THE CIRCULATORY SYSTEM: Chronic | Status: ACTIVE | Noted: 2021-10-20

## 2021-11-01 NOTE — H&P PST ADULT - MALLAMPATI CLASS
Class I (easy) - visualization of the soft palate, fauces, uvula, and both anterior and posterior pillars yes

## 2021-11-16 ENCOUNTER — APPOINTMENT (OUTPATIENT)
Dept: ELECTROPHYSIOLOGY | Facility: CLINIC | Age: 71
End: 2021-11-16
Payer: COMMERCIAL

## 2021-11-16 VITALS
RESPIRATION RATE: 20 BRPM | BODY MASS INDEX: 23.84 KG/M2 | HEIGHT: 72 IN | TEMPERATURE: 97.4 F | OXYGEN SATURATION: 98 % | HEART RATE: 78 BPM | WEIGHT: 176 LBS | DIASTOLIC BLOOD PRESSURE: 71 MMHG | SYSTOLIC BLOOD PRESSURE: 104 MMHG

## 2021-11-16 PROCEDURE — 93284 PRGRMG EVAL IMPLANTABLE DFB: CPT

## 2021-11-16 PROCEDURE — 93000 ELECTROCARDIOGRAM COMPLETE: CPT | Mod: 59

## 2021-11-16 PROCEDURE — 99215 OFFICE O/P EST HI 40 MIN: CPT

## 2021-11-16 PROCEDURE — 99072 ADDL SUPL MATRL&STAF TM PHE: CPT

## 2021-11-16 NOTE — CARDIOLOGY SUMMARY
[de-identified] : \par 11/16/2021: AS-BiV paced. No PVCs.\par 10/05/2021: AS-BiV paced with PVCs.\par 07/27/2021: AS-BiV paced. No PVCs.\par 04/27/2021: NSR with BiV Pacing. PVCs (LBLI axis, aVR/aVL discordance, V3-V4 transition) suggestive of RVOT exit (possibly para-Hisian).\par 07/16/2020: NSR with LBBB (QRSd 160 msec)\par  [de-identified] : \par 03/2021 48 HR Holter: Unifocal PVCs 513/hr (12,312 PVCs).\par 05/2020 Event Monitor for 12 days: NSR. Frequent PVCs of 28%. Short runs of NSVT.\par  [de-identified] : \par 1/2020 Nuclear Stress Test: Inferior/inferoseptal infarct. LVEF: 22%. No ischemia.\par  [de-identified] : \par 06/23/2021 TTE: LVEF: 50-55%. RV mildly enlarged. PASP 32.4 mmHg.\par 11/2020 TTE: LVEF moderately decreased. LA normal (LAd 3.1 cm). RA normal. Mild-moderate TR.\par 06/2020 TTE: Severe LV dysfunction. LVEF: 22%. Grade I diastolic dysfunction. Mild TR. Mild RA enlargement.\par 01/2020 TTE: LVEF: 25-30%. Grade I diastolic dysfunction. Mild TR. Normal RV/LA/RA.\par  [de-identified] : \par 8/27/2020: MDT CRT-D implantation.\par Interrogation 11/16/2021: Effective CRT% 99.1%. \par  [de-identified] : \par 10/20/2021: PVC RFA. PVC 1 (para-Hisian/Tricuspid Inflow) and PVC 2 (R/LCC commissure). A 3rd PVC appeared to be papillary muscle in nature and was rare so was not targeted for ablation.\par  [de-identified] : \par 2/2020: LM normal. mLAD 80% s/p MARÍA 3.5 x 15 mm stent. LCx normal. pRCA 30%. mRCA 50%.\par

## 2021-11-16 NOTE — HISTORY OF PRESENT ILLNESS
[FreeTextEntry1] : RUSS BUCHANAN is a 71 year old male with hyperlipidemia, COPD, PAD s/p stenting, tobacco use, lung cancer (in remission; s/p chemo + XRT in 2018), bladder cancer (2009 s/p BCG instillation with recurrence in 2021), coronary artery disease (s/p mLAD SHANTHI), LBBB, mixed cardiomyopathy with NYHA IIC HFrEF (LVEF: 25-30%) s/p MDT CRT-D (8/27/2020) and frequent PVCs (s/p PVC RFA 10/20/2021) who presents for follow up. He presents with his wife (Anca).\par \par To summarize his history, he was first evaluated by Dr. Benito in 11/2019 after being found to have a LBBB on his ECG. At the time his wife noted that he had dyspnea when walking up stairs. He underwent TTE and nuclear stress testing which demonstrated depressed LVEF of 20-25%. He underwent cardiac catheterization which revealed 80% mLAD disease which was treated with a SHANTHI. He was initiated on GDMT and despite over 3 months of GDMT, his LVEF has remained low at 25-30%. Given his NYHA IIC HFrEF and wide LBBB (160 msec) he underwent MDT CRT-D implantation on 8/27/2020.\par \par In follow up, his LVEF was found to have improved to moderately reduced function by TTE in 11/2020. Interrogation of device in follow up however showed suboptimal  percentages with effective  of 87-93.3%. He wore a 48 hour monitor during which he had 513 unifocal PVCs, equating to about 12,000 PVCs. Options for management were discussed including catheter ablation vs amiodarone therapy. He preferred to avoid invasive procedures so was started on amiodarone.\par \par After initiation of amiodarone, his PVC burden has declined and his effective  hakeem to 98.3%. This was associated with an improvement in LVEF to 50-55%. However, he had balance difficulties so amiodarone was discontinued after which PVCs returned. He therefore underwent catheter ablation on 10/20/2021 with ablation of 2 clinical PVCs - PVC 1 (para-Hisian/Tricuspid Inflow) and PVC 2 (R/LCC commissure). A 3rd PVC appeared to be papillary muscle in nature and was rare so was not targeted for ablation.\par \par Of note, he was diagnosed with recurrent bladder cancer in 6/2021 and is now s/p surgical excision (Dr. West). He does not need any additional chemotherapy. He is scheduled for 6 week follow up.\par \par Today, he states that he has been feeling better since after the ablation. He reports feeling "normal." He feels his breathing has improved. His wife states that she has not noticed as much of a difference.

## 2021-11-16 NOTE — DISCUSSION/SUMMARY
[FreeTextEntry1] : RUSS BUCHANAN is a 71 year old male with hyperlipidemia, COPD, PAD s/p stenting, tobacco use, lung cancer (in remission; s/p chemo + XRT in 2018), bladder cancer (2009 s/p BCG instillation with recurrence in 2021), coronary artery disease (s/p mLAD SHANTHI), LBBB, mixed cardiomyopathy with NYHA IIC HFrEF (LVEF: 25-30%) s/p MDT CRT-D (8/27/2020) and frequent PVCs (s/p PVC RFA 10/20/2021) who presents for follow up. He presents with his wife (Anca).\par \par He is doing very well following PVC ablation with 99.1% effective CRT. I attempted to optimize his CRT device to extend battery life but unfortunately phrenic capture is strong on poles 1 & 2. Best longevity is as programmed with LV2-LV3.\par \par He'll remain on current medical therapy. We'll have him follow up in 3 months and I'll ask Dr. Benito if he can repeat a TTE at that time. If his LVEF is stable, then he can continue follow up with Dr. Benito and see me as needed.\par \par Recommendations:\par - Repeat TTE in 3 months\par - Remote monitoring (will discuss with Eyal Caba)\par \par Follow up in 3 months.\par \par Kaleb Peters MD, FACC, RS\par Clinical Cardiac Electrophysiology

## 2021-12-22 ENCOUNTER — APPOINTMENT (OUTPATIENT)
Dept: CARDIOLOGY | Facility: CLINIC | Age: 71
End: 2021-12-22
Payer: COMMERCIAL

## 2021-12-22 ENCOUNTER — NON-APPOINTMENT (OUTPATIENT)
Age: 71
End: 2021-12-22

## 2021-12-22 VITALS
HEIGHT: 72 IN | DIASTOLIC BLOOD PRESSURE: 70 MMHG | HEART RATE: 88 BPM | WEIGHT: 180 LBS | BODY MASS INDEX: 24.38 KG/M2 | RESPIRATION RATE: 18 BRPM | OXYGEN SATURATION: 95 % | SYSTOLIC BLOOD PRESSURE: 102 MMHG

## 2021-12-22 PROCEDURE — 93289 INTERROG DEVICE EVAL HEART: CPT

## 2021-12-22 PROCEDURE — 99214 OFFICE O/P EST MOD 30 MIN: CPT

## 2021-12-22 PROCEDURE — 93000 ELECTROCARDIOGRAM COMPLETE: CPT | Mod: 59

## 2021-12-22 NOTE — ASSESSMENT
[FreeTextEntry1] : ECG: SR, atrial sensed, BiV paced\par \par BUN 27 Creat 1.6 (3/2021) \par Na 139 K 4.5 Cl 106 CO2 26 BUN 35 Creat 1.9 (1/2021)\par \par  HDL 34 LDL 61  (3/2021) \par BNP 1961 \par  HDL 30 LDL 98  (11/2019) \par \par Interrogation 12/22/2021:\par 1. Normal BiV function, BiV paced 96%\par 2. No events \par \par Interrogation 3/25/2021:\par 1. Normal function, BiV paced 82%\par 2. 1 minute run PAF\par \par ECHO 6/2021:\par 1. TDS/TLS, definity used \par 2. Low normal LV function, EF 50-55%\par 3. Grade I diastolic dysfunction\par 4. RVE with low normal function\par \par \par ECHO 11/2020:\par 1. Moderate LV systolic dysfunction, EF 35-40%\par 2. Grade I diastolic dysfunction\par 3. Low normal RV function\par 4. Normal LA/RA\par 5. Mild to moderate TR, RVSP 31mmHg\par \par ECHO 6/2020:\par 1. TDS\par 2. Severe LV dysfunction, EF 25-30%\par 3. Grade I diastolic dysfunction\par 4. Mild TR\par 5. Mild OLLIE\par \par EVENT MONITOR 5/2020:\par 1. 12 days monitored\par 2. SR\par 3. Frequent PVCs, 28% burden\par 4. Short runs NSVT\par \par LHC 2/2020:\par 1. LM normal\par 2. mLAD 80% stenosis, s/p MARÍA 3.5x15mm stent \par 3. LCX normal\par 4. pRCA 30% stenosis\par 5. mRCA 50% stenosis\par \par CT Chest 2018:  Scattered aortic calcifications and mural thrombus \par \par ECHO 1/2020:\par 1. Severe LV dysfunction, EF 20-25% with severe inferior/inferolateral hypo\par 2. Normal RV/LA/RA\par 3. Mild TR\par \par AORTIC SCAN\par 1. AAA with stent and mural thrombus\par \par NUCLEAR STRESS TEST 1/2020:\par 1.  INferior/inferoseptal infarct with EF 22%\par 2. No ischemia\par \par LE ARTERIAL DUPLEX\par 1. MOderate plaque\par 2. No hemodynamically significant stenosis\par \par EMILEE/PVR\par 1. R 0.86\par 2. L 0.82\par 3. PVR suggestive of inflow disease\par \par

## 2021-12-22 NOTE — PHYSICAL EXAM
[General Appearance - Well Developed] : well developed [General Appearance - Well Nourished] : well nourished [Normal Conjunctiva] : the conjunctiva exhibited no abnormalities [Normal Oropharynx] : normal oropharynx [Normal Jugular Venous V Waves Present] : normal jugular venous V waves present [] : no respiratory distress [Respiration, Rhythm And Depth] : normal respiratory rhythm and effort [Heart Rate And Rhythm] : heart rate and rhythm were normal [Murmurs] : no murmurs present [Edema] : no peripheral edema present [Bowel Sounds] : normal bowel sounds [Abdomen Soft] : soft [Abnormal Walk] : normal gait [Nail Clubbing] : no clubbing of the fingernails [Cyanosis, Localized] : no localized cyanosis [Skin Color & Pigmentation] : normal skin color and pigmentation [Oriented To Time, Place, And Person] : oriented to person, place, and time [Affect] : the affect was normal [FreeTextEntry1] : no edema. DP 1= bilaterally, cap refill = 3 seconds bilaterally

## 2021-12-22 NOTE — DISCUSSION/SUMMARY
[FreeTextEntry1] : Patient is a 70 yo M with tobacco dependence, HLD, COPD, aortic atherosclerosis, AAA with mural thrombus,  PAD, CAD s/p PCI LAD 2/2020, LBBB, mixed CMP,  CRT-D fall 2020 here for cardiac follow up.  \par \par -Testing shows severe CMP that is multifactorial, does have CAD and s/p PCI but no improvement in CMP. Maybe related to prior EtOH and substance abuse years ago, possibly genetic. No myocardidits. Also significant PVCs and improved with Amio therapy due improvement in  % CRT pacing with PVC suppression\par -Echo 6/2021 with significant improvement in LV function, EF 50-55%. \par -Device settings changed by EP as well recently, taken off amio and will consider ablation if PVCs recur\par -CRT check today shows 96% BiV pacing and no events\par -BP low but improved on lower dose entresto, doing much better \par -Recurrent bladder cancer 6/2021 and s/p excision without need for chemo\par -Significant improvement as well since PVC ablation\par \par 1. On good med regimen, no changes at this time, EF has improved. BP low but will continue to monitor, \par 2. Surveillance of CAD, limited functional capacity and PCI LAD in 2020 (will be 2 years in new year)\par 3. DAPT and statin, med management of CAD. \par 4. Continue to monitor for PAF, if recurrence will need to re-assess A/C but none recently\par 5. Follow up 3 months, CRT check then and stress test prior\par 6. Increase physical activity as tolerated \par \par 7. Counselled for more than 3 minutes on smoking cessation \par 8. REcommend pulmonary follow up as well, likely COPD contributing to dyspnea reported by his wife\par 9. Will plan on nuclear stress testing in new year for surveillance of CAD, echo then as well

## 2021-12-22 NOTE — HISTORY OF PRESENT ILLNESS
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 Struggles with being able to quit smoking. On disability for lung cancer, now in remission. Had chemotherapy and under control he states.  Was noted to be having increasing STEVENSON, noted by his wife in fall 2019.. Had echo and stress testing, found to have severe CMP and coreg/entresto started. Underwent cath as well and  s/p LAD stent 2/2020. Underwent CRT-D subsequently due to no improvement in EF\par \par More recently has  undergone orthopedic surgery, right hip. Tolerated well, walks well now. Also excision of recurrent bladder tumor.  No CP/SOB. Patient denies PND/orthopnea/edema/palpitations/syncope/claudication. No dizziness or lightheadedness. Has been only taking the entresto daily, feels less tired on this. Remains sedentary. Cut back his entresto to 49/51 bid due to side effects last visit.  Feeling quite well  now, especially since PVC ablation. \par \par Patient also underwent catheter ablation of PVCs. Had initially been tried on Amio with improvement in PVC burden, better BiV pacing and improved EF. Stopped due to side effects and now s/p ablation with Dr. Peters. 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 Lives with his wife\par  \par ROS: GI and  negative

## 2022-01-21 ENCOUNTER — APPOINTMENT (OUTPATIENT)
Dept: CARDIOLOGY | Facility: CLINIC | Age: 72
End: 2022-01-21

## 2022-01-24 ENCOUNTER — APPOINTMENT (OUTPATIENT)
Dept: CARDIOLOGY | Facility: CLINIC | Age: 72
End: 2022-01-24
Payer: COMMERCIAL

## 2022-01-24 PROCEDURE — 99072 ADDL SUPL MATRL&STAF TM PHE: CPT

## 2022-01-24 PROCEDURE — 93015 CV STRESS TEST SUPVJ I&R: CPT

## 2022-01-24 PROCEDURE — A9500: CPT

## 2022-01-24 PROCEDURE — 78452 HT MUSCLE IMAGE SPECT MULT: CPT

## 2022-01-24 RX ADMIN — REGADENOSON 0 MG/5ML: 0.08 INJECTION, SOLUTION INTRAVENOUS at 00:00

## 2022-01-25 RX ORDER — REGADENOSON 0.08 MG/ML
0.4 INJECTION, SOLUTION INTRAVENOUS
Qty: 1 | Refills: 0 | Status: COMPLETED | OUTPATIENT
Start: 2022-01-24

## 2022-02-08 ENCOUNTER — RX RENEWAL (OUTPATIENT)
Age: 72
End: 2022-02-08

## 2022-03-14 ENCOUNTER — APPOINTMENT (OUTPATIENT)
Dept: CARDIOLOGY | Facility: CLINIC | Age: 72
End: 2022-03-14
Payer: COMMERCIAL

## 2022-03-14 ENCOUNTER — NON-APPOINTMENT (OUTPATIENT)
Age: 72
End: 2022-03-14

## 2022-03-14 VITALS — OXYGEN SATURATION: 96 % | HEART RATE: 82 BPM | DIASTOLIC BLOOD PRESSURE: 76 MMHG | SYSTOLIC BLOOD PRESSURE: 111 MMHG

## 2022-03-14 PROCEDURE — 93000 ELECTROCARDIOGRAM COMPLETE: CPT

## 2022-03-14 PROCEDURE — 99214 OFFICE O/P EST MOD 30 MIN: CPT

## 2022-03-14 PROCEDURE — 99072 ADDL SUPL MATRL&STAF TM PHE: CPT

## 2022-03-14 RX ORDER — PANTOPRAZOLE 40 MG/1
40 TABLET, DELAYED RELEASE ORAL
Qty: 30 | Refills: 2 | Status: ACTIVE | COMMUNITY
Start: 2018-06-01 | End: 1900-01-01

## 2022-03-14 NOTE — HISTORY OF PRESENT ILLNESS
[FreeTextEntry1] : Patient is a 70 yo M with tobacco dependence,  HLD, COPD, PAD s/p PCI, mixed CMP s/p CRT-D 8/2020 here for follow up. Struggles with being able to quit smoking. On disability for lung cancer, now in remission. Had chemotherapy and under control he states.  Was noted to be having increasing STEVENSON, noted by his wife in fall 2019.. Had echo and stress testing, found to have severe CMP and coreg/entresto started. Underwent cath as well and  s/p LAD stent 2/2020. Underwent CRT-D subsequently due to no improvement in EF\par \par More recently has  undergone orthopedic surgery, right hip. Tolerated well, walks well now. Also excision of recurrent bladder tumor.  No CP/SOB. Patient denies PND/orthopnea/edema/palpitations/syncope/claudication. No dizziness or lightheadedness. . Remains sedentary. Cut back his entresto to 49/51 bid due to side effects.  Feeling quite well  now, especially since PVC ablation. \par \par Patient also underwent catheter ablation of PVCs. Had initially been tried on Amio with improvement in PVC burden, better BiV pacing and improved EF. Stopped due to side effects and now s/p ablation with Dr. Peters. 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

## 2022-03-14 NOTE — DISCUSSION/SUMMARY
[FreeTextEntry1] : Patient is a 71 yo M with tobacco dependence, HLD, COPD, aortic atherosclerosis, AAA with mural thrombus,  PAD, CAD s/p PCI LAD 2/2020, LBBB, mixed CMP,  CRT-D fall 2020 here for cardiac follow up.  \par \par -Testing shows severe CMP that is multifactorial, does have CAD and s/p PCI but no improvement in CMP. Maybe related to prior EtOH and substance abuse years ago, possibly genetic. No myocarditis. Also significant PVCs and improved with Amio therapy with better % CRT pacing from PVC suppression\par -PHARM nuclear stress test 1/2022 without ischemia, infarct inferiorly appears smaller and EF improved. Interestingly CAd was all LAD and not RCA. ? if scar/defect was all non-ischemic and has improved with meds/CRT\par -Echo 6/2021 with significant improvement in LV function, EF 50-55%. \par -s/p PVC ablation 10/2021 and doing well, prior had been on amio\par -BP was low but improved on lower dose entresto, doing much better \par -Recurrent bladder cancer 6/2021 and s/p excision without need for chemo\par \par \par 1. On good med regimen, no changes at this time, EF has improved. BP low but will continue to monitor,\par 2. Surveillance of CAD and continue med management \par 3. DAPT and statin\par 4. Continue to monitor for PAF, if recurrence will need to re-assess A/C but none recently. EP f/u 3/15/22\par 5. Follow up 3 months, CRT check then and echo to evaluate his CMP\par 6. Increase physical activity as tolerated \par 7. Has not been following with vascular, will arrange surveillance of AAA/PAD with duplex and EMILEE, if progression will send back to vascular \par 8. Counselled for more than 3 minutes on smoking cessation \par

## 2022-03-14 NOTE — ASSESSMENT
[FreeTextEntry1] : ECG: SR, atrial sensed, BiV paced\par \par BUN 27 Creat 1.6 (3/2021) \par Na 139 K 4.5 Cl 106 CO2 26 BUN 35 Creat 1.9 (1/2021)\par \par  HDL 34 LDL 61  (3/2021) \par BNP 1961 \par  HDL 30 LDL 98  (11/2019) \par \par Interrogation 12/22/2021:\par 1. Normal BiV function, BiV paced 96%\par 2. No events \par \par Interrogation 3/25/2021:\par 1. Normal function, BiV paced 82%\par 2. 1 minute run PAF\par \par PHARM NUCLEAR STRESS TEST 1/2022:\par 1. Prior mid-baasl inferior wall infarct, no ischemia\par 2. Low normal LV function, EF 54%\par 3. Rare PVCs\par 4. Infarct smaller compared to 2019 study\par \par ECHO 6/2021:\par 1. TDS/TLS, definity used \par 2. Low normal LV function, EF 50-55%\par 3. Grade I diastolic dysfunction\par 4. RVE with low normal function\par \par \par ECHO 11/2020:\par 1. Moderate LV systolic dysfunction, EF 35-40%\par 2. Grade I diastolic dysfunction\par 3. Low normal RV function\par 4. Normal LA/RA\par 5. Mild to moderate TR, RVSP 31mmHg\par \par ECHO 6/2020:\par 1. TDS\par 2. Severe LV dysfunction, EF 25-30%\par 3. Grade I diastolic dysfunction\par 4. Mild TR\par 5. Mild OLLIE\par \par EVENT MONITOR 5/2020:\par 1. 12 days monitored\par 2. SR\par 3. Frequent PVCs, 28% burden\par 4. Short runs NSVT\par \par LHC 2/2020:\par 1. LM normal\par 2. mLAD 80% stenosis, s/p MARÍA 3.5x15mm stent \par 3. LCX normal\par 4. pRCA 30% stenosis\par 5. mRCA 50% stenosis\par \par CT Chest 2018:  Scattered aortic calcifications and mural thrombus \par \par ECHO 1/2020:\par 1. Severe LV dysfunction, EF 20-25% with severe inferior/inferolateral hypo\par 2. Normal RV/LA/RA\par 3. Mild TR\par \par AORTIC SCAN\par 1. AAA with stent and mural thrombus\par \par NUCLEAR STRESS TEST 1/2020:\par 1.  INferior/inferoseptal infarct with EF 22%\par 2. No ischemia\par \par LE ARTERIAL DUPLEX\par 1. MOderate plaque\par 2. No hemodynamically significant stenosis\par \par EMILEE/PVR\par 1. R 0.86\par 2. L 0.82\par 3. PVR suggestive of inflow disease\par \par

## 2022-03-15 ENCOUNTER — APPOINTMENT (OUTPATIENT)
Dept: ELECTROPHYSIOLOGY | Facility: CLINIC | Age: 72
End: 2022-03-15
Payer: COMMERCIAL

## 2022-03-15 VITALS
HEART RATE: 84 BPM | HEIGHT: 72 IN | RESPIRATION RATE: 14 BRPM | BODY MASS INDEX: 24.79 KG/M2 | OXYGEN SATURATION: 95 % | SYSTOLIC BLOOD PRESSURE: 116 MMHG | TEMPERATURE: 97.9 F | WEIGHT: 183 LBS | DIASTOLIC BLOOD PRESSURE: 72 MMHG

## 2022-03-15 PROCEDURE — 93000 ELECTROCARDIOGRAM COMPLETE: CPT

## 2022-03-15 PROCEDURE — 99214 OFFICE O/P EST MOD 30 MIN: CPT

## 2022-03-15 PROCEDURE — 99072 ADDL SUPL MATRL&STAF TM PHE: CPT

## 2022-03-15 RX ORDER — SILDENAFIL 100 MG/1
100 TABLET, FILM COATED ORAL
Qty: 12 | Refills: 0 | Status: ACTIVE | COMMUNITY
Start: 2022-01-25

## 2022-03-15 NOTE — DISCUSSION/SUMMARY
[FreeTextEntry1] : RUSS BUCHANAN is a 72 year old male with hyperlipidemia, COPD, PAD s/p stenting, tobacco use, lung cancer (in remission; s/p chemo + XRT in 2018), bladder cancer (2009 s/p BCG instillation with recurrence in 2021), coronary artery disease (s/p mLAD SHANTHI), LBBB, frequent PVCs (s/p PVC RFA 10/20/2021) and mixed cardiomyopathy with NYHA IIC HFimpEF (LVEF: 25-30% > 54% (1/2022)) s/p MDT CRT-D (8/27/2020) who presents for follow up. He presents with his wife (Anca).\par \par His device was optimized to extend battery life. Although battery life would be extended if we paced from LV poles 1-3 or 1-4, I would be concerned about occasional phrenic capture which he had before. Therefore, LV2-3 was kept and pulse width increased to 0.8msec which extended battery life by a small amount.\par \par In regards to his atrial fibrillation, he only had one episode lasting for ~1 minute in 3/2021. Although his CHADS2-VASc is 2-3 (~CHF, CAD, Agex1), given very low burden of subclinical AF, I do not think he warrants anticoagulation at this time. Will continue to monitor and if he has longer episodes (over 24 hours), then can consider initiation of systemic anticoagulation.\par \par Recommendations:\par - Remote monitoring (will discuss with Eyal Caba)\par \par Follow up with Dr. Benito. See EP as needed.\par \par Kaleb Peters MD, FACC, RS\par Clinical Cardiac Electrophysiology

## 2022-03-15 NOTE — HISTORY OF PRESENT ILLNESS
[FreeTextEntry1] : RUSS BUCHANAN is a 72 year old male with hyperlipidemia, COPD, PAD s/p stenting, tobacco use, lung cancer (in remission; s/p chemo + XRT in 2018), bladder cancer (2009 s/p BCG instillation with recurrence in 2021), coronary artery disease (s/p mLAD SHANTHI), LBBB, frequent PVCs (s/p PVC RFA 10/20/2021) and mixed cardiomyopathy with NYHA IIC HFimpEF (LVEF: 25-30% > 54% (1/2022)) s/p MDT CRT-D (8/27/2020) who presents for follow up. He presents with his wife (Anca).\par \par To summarize his history, he was first evaluated by Dr. Benito in 11/2019 after being found to have a LBBB on his ECG. At the time his wife noted that he had dyspnea when walking up stairs. He underwent TTE and nuclear stress testing which demonstrated depressed LVEF of 20-25%. He underwent cardiac catheterization which revealed 80% mLAD disease which was treated with a SHANTHI. He was initiated on GDMT and despite over 3 months of GDMT, his LVEF has remained low at 25-30%. Given his NYHA IIC HFrEF and wide LBBB (160 msec) he underwent MDT CRT-D implantation on 8/27/2020.\par \par In follow up, his LVEF was found to have improved to moderately reduced function by TTE in 11/2020. Interrogation of device in follow up however showed suboptimal  percentages with effective  of 87-93.3%. He wore a 48 hour monitor during which he had 513 unifocal PVCs, equating to about 12,000 PVCs. Options for management were discussed including catheter ablation vs amiodarone therapy. He preferred to avoid invasive procedures so was started on amiodarone.\par \par After initiation of amiodarone, his PVC burden has declined and his effective  hakeem to 98.3%. This was associated with an improvement in LVEF to 50-55%. However, he had balance difficulties so amiodarone was discontinued after which PVCs returned. He therefore underwent catheter ablation on 10/20/2021 with ablation of 2 clinical PVCs - PVC 1 (para-Hisian/Tricuspid Inflow) and PVC 2 (R/LCC commissure). A 3rd PVC appeared to be papillary muscle in nature and was rare so was not targeted for ablation. Following ablation, he had a stress test in 1/2022 which revealed normalization of LVEF to 54%.\par \par Of note, he was diagnosed with recurrent bladder cancer in 6/2021 and is now s/p surgical excision (Dr. West). He does not need any additional chemotherapy.\par \par Today, he states that he is feeling very well. They just returned from a week in [place] and his wife encouraged him to walk. He reports his breathing is better, his wife feels it is a little bit better.

## 2022-03-15 NOTE — PHYSICAL EXAM
[Well Developed] : well developed [Well Nourished] : well nourished [No Acute Distress] : no acute distress [Normal Conjunctiva] : normal conjunctiva [No Respiratory Distress] : no respiratory distress  [Normal Gait] : normal gait [No Rash] : no rash [Moves all extremities] : moves all extremities [No Focal Deficits] : no focal deficits [Alert and Oriented] : alert and oriented

## 2022-03-15 NOTE — CARDIOLOGY SUMMARY
[de-identified] : \par 03/15/2022: AS-Biv paced. No PVCs.\par 10/05/2021: AS-BiV paced with PVCs.\par 07/27/2021: AS-BiV paced. No PVCs.\par 04/27/2021: NSR with BiV Pacing. PVCs (LBLI axis, aVR/aVL discordance, V3-V4 transition) suggestive of RVOT exit (possibly para-Hisian).\par 07/16/2020: NSR with LBBB (QRSd 160 msec)\par  [de-identified] : \par 03/2021 48 HR Holter: Unifocal PVCs 513/hr (12,312 PVCs).\par 05/2020 Event Monitor for 12 days: NSR. Frequent PVCs of 28%. Short runs of NSVT.\par  [de-identified] : \par 01/24/2022 Regadenoson NST: Small, mild-moderate intensity, fixed defect of basal-mid inferior wall consistent with infarction. LVEF: 54%.\par 1/2020 Nuclear Stress Test: Inferior/inferoseptal infarct. LVEF: 22%. No ischemia.\par  [de-identified] : \par 06/23/2021 TTE: LVEF: 50-55%. RV mildly enlarged. PASP 32.4 mmHg.\par 11/2020 TTE: LVEF moderately decreased. LA normal (LAd 3.1 cm). RA normal. Mild-moderate TR.\par 06/2020 TTE: Severe LV dysfunction. LVEF: 22%. Grade I diastolic dysfunction. Mild TR. Mild RA enlargement.\par 01/2020 TTE: LVEF: 25-30%. Grade I diastolic dysfunction. Mild TR. Normal RV/LA/RA.\par  [de-identified] : \par 8/27/2020: MDT CRT-D implantation.\par Interrogation 03/15/2022: Normal lead parameters. Phrenic capture noted on all poles except for LV 2-3. LV capture low on pole LV1, but had phrenic at higher outputs. LV2-3 settings changed to 0.8 msec to improve battery life.\par  [de-identified] : \par 10/20/2021: PVC RFA. PVC 1 (para-Hisian/Tricuspid Inflow) and PVC 2 (R/LCC commissure). A 3rd PVC appeared to be papillary muscle in nature and was rare so was not targeted for ablation.\par  [de-identified] : \par 2/2020: LM normal. mLAD 80% s/p MARÍA 3.5 x 15 mm stent. LCx normal. pRCA 30%. mRCA 50%.\par

## 2022-03-15 NOTE — REASON FOR VISIT
[Arrhythmia/ECG Abnorrmalities] : arrhythmia/ECG abnormalities [FreeTextEntry3] : Dr. Benito [FreeTextEntry1] : PCP: Dr. Erin Walters

## 2022-03-22 ENCOUNTER — APPOINTMENT (OUTPATIENT)
Dept: CARDIOLOGY | Facility: CLINIC | Age: 72
End: 2022-03-22
Payer: COMMERCIAL

## 2022-03-22 PROCEDURE — 93978 VASCULAR STUDY: CPT

## 2022-04-21 ENCOUNTER — APPOINTMENT (OUTPATIENT)
Dept: CARDIOLOGY | Facility: CLINIC | Age: 72
End: 2022-04-21
Payer: MEDICARE

## 2022-04-21 PROCEDURE — 93306 TTE W/DOPPLER COMPLETE: CPT

## 2022-04-21 PROCEDURE — 93923 UPR/LXTR ART STDY 3+ LVLS: CPT

## 2022-04-28 ENCOUNTER — APPOINTMENT (OUTPATIENT)
Dept: CARDIOLOGY | Facility: CLINIC | Age: 72
End: 2022-04-28
Payer: MEDICARE

## 2022-04-28 PROCEDURE — 93925 LOWER EXTREMITY STUDY: CPT

## 2022-05-11 RX ORDER — KIT FOR THE PREPARATION OF TECHNETIUM TC99M SESTAMIBI 1 MG/5ML
INJECTION, POWDER, LYOPHILIZED, FOR SOLUTION PARENTERAL
Refills: 0 | Status: COMPLETED | OUTPATIENT
Start: 2022-05-11

## 2022-05-11 RX ADMIN — KIT FOR THE PREPARATION OF TECHNETIUM TC99M SESTAMIBI 0: 1 INJECTION, POWDER, LYOPHILIZED, FOR SOLUTION PARENTERAL at 00:00

## 2022-06-08 NOTE — CONSULT NOTE ADULT - ASSESSMENT
Biopsy Photograph Reviewed: Yes Patient is a 70 year old male with a pmhx of hyperlipidemia, COPD, PAD s/p b/l stenting, tobacco use, lung cancer (in remission; s/p chemo + XRT in 2018), coronary artery disease (s/p mLAD SHANTHI), LBBB and mixed cardiomyopathy with NYHA IIC HFrEF (LVEF: 25-30%), presented today for CRT-D placement, found to have:    1. post op hypotension Patient is a 70 year old male with a pmhx of hyperlipidemia, COPD, PAD s/p b/l stenting, tobacco use, lung cancer (in remission; s/p chemo + XRT in 2018), coronary artery disease (s/p mLAD SHANTHI), LBBB and mixed cardiomyopathy with NYHA IIC HFrEF (LVEF: 25-30%), presented today for CRT-D placement, found to have:    1. post op hypotension likely 2/2 anesthesia    Plan  Neuro: pain control with Tylenol, oxycodone PO, cont Klonopin and nicotine patch  Cardio: Hypotension post procedure likely 2/2 anesthesia. Case was ~6 hours long. Start on levo for BP support and titrate to keep MAP above 65. Stat TTE done showing no pericardial effusion. Hold home anti-HTN while on pressors. cont statin  Pulm: Resp status stable   GI: Diet as tolerated, PPI  Renal: Strict I/Os, renally dose meds, replace lytes prn   Id: Ancef x 2 doses  Endo: no active issues   Heme: cont aspirin and plavix. chemical AC per EP    Dispo: admit to MICU, titrate off levo, downgrade in morning

## 2022-06-21 ENCOUNTER — APPOINTMENT (OUTPATIENT)
Dept: CARDIOLOGY | Facility: CLINIC | Age: 72
End: 2022-06-21
Payer: COMMERCIAL

## 2022-06-21 ENCOUNTER — NON-APPOINTMENT (OUTPATIENT)
Age: 72
End: 2022-06-21

## 2022-06-21 VITALS
BODY MASS INDEX: 25.19 KG/M2 | SYSTOLIC BLOOD PRESSURE: 106 MMHG | WEIGHT: 186 LBS | DIASTOLIC BLOOD PRESSURE: 70 MMHG | RESPIRATION RATE: 14 BRPM | OXYGEN SATURATION: 97 % | HEIGHT: 72 IN | HEART RATE: 78 BPM

## 2022-06-21 DIAGNOSIS — F17.200 NICOTINE DEPENDENCE, UNSPECIFIED, UNCOMPLICATED: ICD-10-CM

## 2022-06-21 DIAGNOSIS — R68.89 OTHER GENERAL SYMPTOMS AND SIGNS: ICD-10-CM

## 2022-06-21 DIAGNOSIS — I70.0 ATHEROSCLEROSIS OF AORTA: ICD-10-CM

## 2022-06-21 PROCEDURE — 93000 ELECTROCARDIOGRAM COMPLETE: CPT | Mod: 59

## 2022-06-21 PROCEDURE — 99214 OFFICE O/P EST MOD 30 MIN: CPT

## 2022-06-21 PROCEDURE — 93289 INTERROG DEVICE EVAL HEART: CPT

## 2022-06-21 PROCEDURE — 93000 ELECTROCARDIOGRAM COMPLETE: CPT

## 2022-06-21 NOTE — ASSESSMENT
[FreeTextEntry1] : ECG: SR, atrial sensed, BiV paced\par \par BUN 27 Creat 1.6 (3/2021) \par Na 139 K 4.5 Cl 106 CO2 26 BUN 35 Creat 1.9 (1/2021)\par \par  HDL 34 LDL 61  (3/2021) \par BNP 1961 \par  HDL 30 LDL 98  (11/2019) \par \par CRT-D Check 6/21/2022: NOrmal function, BiV paced 99% (not dependent) \par \par AORTIC DUPLEX 3/2022:\par 1. Large fusiform AAA, distally, 4.2 x 4.5cm\par \par EMILEE/PVR 4/2022:\par 1. R 0.55 L 0.78\par 2. PVR c/w SFA disease\par \par LE ARTERIAL DUPLEX 4/2022: \par 1. MOderate plaque, no obstruction\par \par PHARM NUCLEAR STRESS TEST 1/2022:\par 1. Prior mid-baasl inferior wall infarct, no ischemia\par 2. Low normal LV function, EF 54%\par 3. Rare PVCs\par 4. Infarct smaller compared to 2019 study\par \par ECHO 4/2022:\par 1. NOrmal LV size and function, EF 55%\par 2. Moderate RVE with normal function\par 3. Moderate OLLIE\par 4. Mild TR, RVSP 34mmHg\par \par ECHO 6/2021:\par 1. TDS/TLS, definity used \par 2. Low normal LV function, EF 50-55%\par 3. Grade I diastolic dysfunction\par 4. RVE with low normal function\par \par \par ECHO 11/2020:\par 1. Moderate LV systolic dysfunction, EF 35-40%\par 2. Grade I diastolic dysfunction\par 3. Low normal RV function\par 4. Normal LA/RA\par 5. Mild to moderate TR, RVSP 31mmHg\par \par ECHO 6/2020:\par 1. TDS\par 2. Severe LV dysfunction, EF 25-30%\par 3. Grade I diastolic dysfunction\par 4. Mild TR\par 5. Mild OLLIE\par \par EVENT MONITOR 5/2020:\par 1. 12 days monitored\par 2. SR\par 3. Frequent PVCs, 28% burden\par 4. Short runs NSVT\par \par LHC 2/2020:\par 1. LM normal\par 2. mLAD 80% stenosis, s/p MARÍA 3.5x15mm stent \par 3. LCX normal\par 4. pRCA 30% stenosis\par 5. mRCA 50% stenosis\par \par CT Chest 2018:  Scattered aortic calcifications and mural thrombus \par \par ECHO 1/2020:\par 1. Severe LV dysfunction, EF 20-25% with severe inferior/inferolateral hypo\par 2. Normal RV/LA/RA\par 3. Mild TR\par \par AORTIC SCAN\par 1. AAA with stent and mural thrombus\par \par NUCLEAR STRESS TEST 1/2020:\par 1.  INferior/inferoseptal infarct with EF 22%\par 2. No ischemia\par \par LE ARTERIAL DUPLEX\par 1. MOderate plaque\par 2. No hemodynamically significant stenosis\par \par EMILEE/PVR\par 1. R 0.86\par 2. L 0.82\par 3. PVR suggestive of inflow disease\par \par

## 2022-06-21 NOTE — DISCUSSION/SUMMARY
[FreeTextEntry1] : Patient is a 71 yo M with tobacco dependence, HLD, COPD, aortic atherosclerosis, AAA with mural thrombus,  PAD, CAD s/p PCI LAD 2/2020, LBBB, mixed CMP,  CRT-D fall 2020 here for cardiac follow up.  \par \par -Testing has showed severe CMP that is multifactorial, does have CAD and s/p PCI but no improvement in CMP. Maybe related to prior EtOH and substance abuse years ago, possibly genetic. No myocarditis. Also significant PVCs and improved with Amio therapy with better % CRT pacing from PVC suppression\par -PHARM nuclear stress test 1/2022 without ischemia, infarct inferiorly appears smaller and EF improved. Interestingly CAd was all LAD and not RCA. ? if scar/defect was all non-ischemic and has improved with meds/CRT and PVC ablation. Still with RVE \par -s/p PVC ablation 10/2021 and doing well, prior had been on amio\par \par -Echo 6/2022 with significant improvement in LV function, EF 55%. \par -Did not tolerate higher dose entresto, doing well on current regimen\par \par -Recurrent bladder cancer 6/2021 and s/p excision without need for chemo\par \par \par 1. On good med regimen, no changes at this time, EF has improved. No CHF at this time\par 2. Surveillance of CAD and continue med management \par 3. DAPT and statin\par 4. Continue to monitor for PAF, if recurrence will need to re-assess A/C but none recently. Had EP f/u 3/15/22 and no will follow there only prn\par 5. Vascular evaluation, EMILEE 0.55 on the right but no obstruction on duplex, ? aortoiliac disease. Also with moderate sized AAA\par 6. Increase physical activity as tolerated \par 7. CRT check in 3 months same day as follow up with me \par 8. Counselled for more than 3 minutes on smoking cessation \par

## 2022-06-21 NOTE — HISTORY OF PRESENT ILLNESS
[FreeTextEntry1] : Patient is a 70 yo M with tobacco dependence,  HLD, COPD, PAD s/p PCI, mixed CMP s/p CRT-D 8/2020 here for follow up. Struggles with being able to quit smoking. On disability for lung cancer, now in remission. Had chemotherapy and under control he states.  Was noted to be having increasing STEVENSON, noted by his wife in fall 2019.. Had echo and stress testing, found to have severe CMP and coreg/entresto started. Underwent cath as well and  s/p LAD stent 2/2020. Underwent CRT-D subsequently due to no improvement in EF\par \par More recently has  undergone orthopedic surgery, right hip. Tolerated well, walks well now. Also excision of recurrent bladder tumor.  No CP/SOB. Patient denies PND/orthopnea/edema/palpitations/syncope/claudication. No dizziness or lightheadedness. . Remains sedentary. Cut back his entresto to 49/51 bid due to side effects.  Feeling quite well  now, especially since PVC ablation. \par \par Patient also underwent catheter ablation of PVCs. Had initially been tried on Amio with improvement in PVC burden, better BiV pacing and improved EF. Stopped due to side effects and now s/p ablation with Dr. Peters. NO new complaints at this time. Patient underwent cardiac testing after last visit. \par \par Also had bladder cancer 2021 s/p excision without need for chemo. 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 Lives with his wife\par  \par ROS: GI and  negative

## 2022-07-21 ENCOUNTER — RX RENEWAL (OUTPATIENT)
Age: 72
End: 2022-07-21

## 2022-07-26 ENCOUNTER — RX RENEWAL (OUTPATIENT)
Age: 72
End: 2022-07-26

## 2022-09-07 ENCOUNTER — APPOINTMENT (OUTPATIENT)
Dept: VASCULAR SURGERY | Facility: CLINIC | Age: 72
End: 2022-09-07

## 2022-09-07 VITALS
DIASTOLIC BLOOD PRESSURE: 61 MMHG | WEIGHT: 182 LBS | TEMPERATURE: 97.4 F | SYSTOLIC BLOOD PRESSURE: 89 MMHG | RESPIRATION RATE: 14 BRPM | HEART RATE: 81 BPM | OXYGEN SATURATION: 95 % | HEIGHT: 69 IN | BODY MASS INDEX: 26.96 KG/M2

## 2022-09-07 VITALS — DIASTOLIC BLOOD PRESSURE: 52 MMHG | SYSTOLIC BLOOD PRESSURE: 78 MMHG

## 2022-09-07 PROCEDURE — 99213 OFFICE O/P EST LOW 20 MIN: CPT

## 2022-09-07 NOTE — ASSESSMENT
[FreeTextEntry1] : 73 yo male with asymptomatic 4.2 cm infrarenal AAA\par \par Plan for CTA abdomen and pelvis to better evaluate AAA\par Pt counseled on smoking cessation \par Continue ASA, Plavix and atorvastatin \par RTC after CTA to review results\par \par A total of 30 minutes was spent with patient and coordinating care\par

## 2022-09-07 NOTE — PROCEDURE
[FreeTextEntry1] : 3/22/22 aorta duplex: 4.2 AAA distal aorta with layered thrombus. right iliac artery stent

## 2022-09-07 NOTE — HISTORY OF PRESENT ILLNESS
[FreeTextEntry1] : 71 yo male PMHx arthritis, CAD s/p stenting, pacemaker, lung cancer, presents for evaluation of AAA. Pt had an aortic duplex 3/22/22 and the infrarenal aorta measured 4.2 cm at max diameter. Pt denies any severe chest pain, back pain or abdominal pain. Pt smokes 1 pack of cigarettes per day. Pt takes aspirin, Plavix and Atorvastatin.

## 2022-09-21 ENCOUNTER — APPOINTMENT (OUTPATIENT)
Dept: VASCULAR SURGERY | Facility: CLINIC | Age: 72
End: 2022-09-21

## 2022-09-27 ENCOUNTER — APPOINTMENT (OUTPATIENT)
Dept: CARDIOLOGY | Facility: CLINIC | Age: 72
End: 2022-09-27

## 2022-09-30 ENCOUNTER — APPOINTMENT (OUTPATIENT)
Dept: CARDIOLOGY | Facility: CLINIC | Age: 72
End: 2022-09-30

## 2022-10-18 ENCOUNTER — APPOINTMENT (OUTPATIENT)
Dept: CARDIOLOGY | Facility: CLINIC | Age: 72
End: 2022-10-18

## 2022-10-18 PROCEDURE — 93295 DEV INTERROG REMOTE 1/2/MLT: CPT

## 2022-10-18 PROCEDURE — 93296 REM INTERROG EVL PM/IDS: CPT

## 2022-10-19 ENCOUNTER — APPOINTMENT (OUTPATIENT)
Dept: VASCULAR SURGERY | Facility: CLINIC | Age: 72
End: 2022-10-19

## 2022-10-26 ENCOUNTER — APPOINTMENT (OUTPATIENT)
Dept: VASCULAR SURGERY | Facility: CLINIC | Age: 72
End: 2022-10-26

## 2022-10-26 VITALS
DIASTOLIC BLOOD PRESSURE: 49 MMHG | HEART RATE: 68 BPM | WEIGHT: 174 LBS | HEIGHT: 69 IN | OXYGEN SATURATION: 98 % | SYSTOLIC BLOOD PRESSURE: 89 MMHG | BODY MASS INDEX: 25.77 KG/M2 | TEMPERATURE: 97.3 F | RESPIRATION RATE: 14 BRPM

## 2022-10-26 PROCEDURE — 99213 OFFICE O/P EST LOW 20 MIN: CPT

## 2022-10-26 NOTE — HISTORY OF PRESENT ILLNESS
[FreeTextEntry1] : 9/7/22: 71 yo male PMHx arthritis, CAD s/p stenting, pacemaker, lung cancer, presents for evaluation of AAA. Pt had an aortic duplex 3/22/22 and the infrarenal aorta measured 4.2 cm at max diameter. Pt denies any severe chest pain, back pain or abdominal pain. Pt smokes 1 pack of cigarettes per day. Pt takes aspirin, Plavix and Atorvastatin. \par \par 10/26/22: Pt here today to review his CTA results. He is doing well since last visit. No new symptoms.  Pt continues to smokes 1 pack of cigarettes per day. Pt takes aspirin, Plavix and Atorvastatin.

## 2022-10-26 NOTE — PROCEDURE
[FreeTextEntry1] : 3/22/22 aorta duplex: 4.2 AAA distal aorta with layered thrombus. right iliac artery stent \par \par CTA: 5.1 cm AAA

## 2022-10-26 NOTE — ASSESSMENT
[FreeTextEntry1] : 73 yo male with asymptomatic 5.1 cm infrarenal AAA\par \par Pt counseled on results of duplex and above diagnosis. Pt does not need intervention at this time. \par Pt counseled on smoking cessation \par Continue ASA, Plavix and atorvastatin \par RTC in 3 months for abdominal aorta duplex \par \par A total of 30 minutes was spent with patient and coordinating care\par

## 2022-11-01 ENCOUNTER — APPOINTMENT (OUTPATIENT)
Dept: CARDIOLOGY | Facility: CLINIC | Age: 72
End: 2022-11-01

## 2022-11-01 VITALS
OXYGEN SATURATION: 98 % | BODY MASS INDEX: 25.92 KG/M2 | RESPIRATION RATE: 18 BRPM | HEIGHT: 69 IN | DIASTOLIC BLOOD PRESSURE: 60 MMHG | HEART RATE: 82 BPM | SYSTOLIC BLOOD PRESSURE: 90 MMHG | WEIGHT: 175 LBS

## 2022-11-01 DIAGNOSIS — Z87.891 PERSONAL HISTORY OF NICOTINE DEPENDENCE: ICD-10-CM

## 2022-11-01 PROCEDURE — 93000 ELECTROCARDIOGRAM COMPLETE: CPT

## 2022-11-01 PROCEDURE — 99214 OFFICE O/P EST MOD 30 MIN: CPT

## 2022-11-01 RX ORDER — OXYCODONE 5 MG/1
5 TABLET ORAL
Qty: 28 | Refills: 0 | Status: DISCONTINUED | COMMUNITY
Start: 2022-10-14 | End: 2022-11-01

## 2022-11-01 RX ORDER — SACUBITRIL AND VALSARTAN 49; 51 MG/1; MG/1
49-51 TABLET, FILM COATED ORAL
Qty: 180 | Refills: 3 | Status: DISCONTINUED | COMMUNITY
Start: 2020-06-08 | End: 2022-11-01

## 2022-11-01 RX ORDER — CARVEDILOL 25 MG/1
25 TABLET, FILM COATED ORAL
Qty: 180 | Refills: 3 | Status: DISCONTINUED | COMMUNITY
Start: 2020-01-15 | End: 2022-11-01

## 2022-11-01 NOTE — ADDENDUM
[FreeTextEntry1] : Patient has been taking midodrine 10mg daily, trial of cutting to 5mg daily with goal to wean off

## 2022-11-01 NOTE — PHYSICAL EXAM
[General Appearance - Well Developed] : well developed [General Appearance - Well Nourished] : well nourished [Normal Conjunctiva] : the conjunctiva exhibited no abnormalities [Normal Oropharynx] : normal oropharynx [Normal Jugular Venous V Waves Present] : normal jugular venous V waves present [] : no respiratory distress [Respiration, Rhythm And Depth] : normal respiratory rhythm and effort [Heart Rate And Rhythm] : heart rate and rhythm were normal [Murmurs] : no murmurs present [Edema] : no peripheral edema present [Bowel Sounds] : normal bowel sounds [Abdomen Soft] : soft [Abnormal Walk] : normal gait [Nail Clubbing] : no clubbing of the fingernails [Cyanosis, Localized] : no localized cyanosis [Skin Color & Pigmentation] : normal skin color and pigmentation [Oriented To Time, Place, And Person] : oriented to person, place, and time [Affect] : the affect was normal [FreeTextEntry1] : no edema

## 2022-11-01 NOTE — HISTORY OF PRESENT ILLNESS
[FreeTextEntry1] : Patient is a 71 yo M with tobacco dependence,  HLD, COPD, PAD s/p PCI, mixed CMP s/p CRT-D 8/2020 here for follow up.  On disability for lung cancer, remains in remission. Had chemotherapy years ago. Also had bladder cancer 2021 s/p excision without need for chemo. \par \par  Was noted to be having increasing STEVENSON, noted by his wife in fall 2019.. Had echo and stress testing, found to have severe CMP and coreg/entresto started. Underwent cath as well and  s/p LAD stent 2/2020. Underwent CRT-D subsequently due to no improvement in EF\par \par Patient  underwent catheter ablation of PVCs. Had initially been tried on Amio with improvement in PVC burden, better BiV pacing and improved EF. Stopped due to side effects and now s/p ablation with Dr. Peters. \par \par Was recently in [place] for COVID, BP lower than baseline and meds cut back. Was in [place] now home. Recently seen by vascular for surveillance of AAA\par \par \par States feels at baseline now. Strength good and no CP/SOB. 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

## 2022-11-01 NOTE — DISCUSSION/SUMMARY
[FreeTextEntry1] : Patient is a 71 yo M with tobacco dependence, HLD, COPD, aortic atherosclerosis, AAA with mural thrombus,  PAD, CAD s/p PCI LAD 2/2020, LBBB, mixed CMP,  CRT-D fall 2020 here for cardiac follow up.  \par \par \par -Testing has showed severe CMP that is multifactorial, does have CAD and s/p PCI but no improvement in CMP. Maybe related to prior EtOH and substance abuse years ago, possibly genetic. No myocarditis. Also significant PVCs and improved with Amio therapy with better % CRT pacing from PVC suppression\par \par -PHARM nuclear stress test 1/2022 without ischemia, infarct inferiorly appears smaller and EF improved. Interestingly CAd was all LAD and not RCA. ? if scar/defect was all non-ischemic and has improved with meds/CRT and PVC ablation. Still with RVE \par \par -s/p PVC ablation 10/2021 and doing well, prior had been on amio\par \par -Echo 6/2022 with significant improvement in LV function, EF 55%. \par \par -Recurrent bladder cancer 6/2021 and s/p excision without need for chemo\par \par -Recent COVID has now recovered. BP remains low\par \par \par 1. Cut back carvedilol to 3.125mg bid, will find out dosing of midodrine as goal to titrate off if possilbe\par 2. Surveillance of CAD and continue med management \par 3. DAPT and statin\par 4. Continue to monitor for PAF, if recurrence will need to re-assess A/C but none recently. Device check today with no recurrence\par 5. Regular vascular follow up , EMILEE 0.55 on the right but no obstruction on duplex, ? aortoiliac disease. Also with moderate sized AAA\par 6. Increase physical activity as tolerated \par 7. CRT check in 3 months same day as follow up with me \par 8. Echo at follow up as well to evaluate CMP\par

## 2023-01-18 ENCOUNTER — APPOINTMENT (OUTPATIENT)
Dept: VASCULAR SURGERY | Facility: CLINIC | Age: 73
End: 2023-01-18
Payer: MEDICARE

## 2023-01-18 VITALS
HEART RATE: 80 BPM | DIASTOLIC BLOOD PRESSURE: 58 MMHG | RESPIRATION RATE: 16 BRPM | HEIGHT: 69 IN | OXYGEN SATURATION: 97 % | WEIGHT: 178 LBS | TEMPERATURE: 97.7 F | BODY MASS INDEX: 26.36 KG/M2 | SYSTOLIC BLOOD PRESSURE: 86 MMHG

## 2023-01-18 VITALS — SYSTOLIC BLOOD PRESSURE: 86 MMHG | DIASTOLIC BLOOD PRESSURE: 60 MMHG

## 2023-01-18 PROCEDURE — 93978 VASCULAR STUDY: CPT

## 2023-01-18 PROCEDURE — 99213 OFFICE O/P EST LOW 20 MIN: CPT

## 2023-01-18 NOTE — ASSESSMENT
[FreeTextEntry1] : 73 yo male with asymptomatic 5.4 cm infrarenal AAA and right common iliac artery aneurysm s/p stenting. R ROSALIE 4.2 x 3.6cm with endoleak \par \par Pt counseled on results of duplex and above diagnosis. \par Pt counseled on smoking cessation \par Continue ASA, Plavix and atorvastatin \par Plan for aortogram with embolization of right hypogastric artery and possible right common iliac stent. The risks and benefits of the surgical procedure were discussed with patient, all questions were answered.\par \par \par A total of 20 minutes was spent with patient and coordinating care\par

## 2023-01-18 NOTE — HISTORY OF PRESENT ILLNESS
[FreeTextEntry1] : 9/7/22: 71 yo male PMHx arthritis, CAD s/p stenting, pacemaker, lung cancer, presents for evaluation of AAA. Pt had an aortic duplex 3/22/22 and the infrarenal aorta measured 4.2 cm at max diameter. Pt denies any severe chest pain, back pain or abdominal pain. Pt smokes 1 pack of cigarettes per day. Pt takes aspirin, Plavix and Atorvastatin. \par \par 10/26/22: Pt here today to review his CTA results. He is doing well since last visit. No new symptoms.  Pt continues to smokes 1 pack of cigarettes per day. Pt takes aspirin, Plavix and Atorvastatin. \par \par 1/18/23: Pt here today for AAA duplex to monitor for growth. He is doing well since last visit. No new symptoms.  Pt continues to smokes 1 pack of cigarettes per day. Pt takes aspirin, Plavix and Atorvastatin.

## 2023-01-18 NOTE — PROCEDURE
[FreeTextEntry1] : 3/22/22 aorta duplex: 4.2 AAA distal aorta with layered thrombus. right iliac artery stent \par \par CTA: 5.1 cm AAA \par \par 1/18/23 aorta duplex: 5.4 x 4.5 cm AAA distal aorta with layered thrombus. right iliac artery stent with endoleak. Right common iliac artery aneurysm sac 4.2 cm x 3.6 cm

## 2023-01-27 ENCOUNTER — OUTPATIENT (OUTPATIENT)
Dept: OUTPATIENT SERVICES | Facility: HOSPITAL | Age: 73
LOS: 1 days | End: 2023-01-27
Payer: MEDICARE

## 2023-01-27 ENCOUNTER — RESULT REVIEW (OUTPATIENT)
Age: 73
End: 2023-01-27

## 2023-01-27 VITALS
RESPIRATION RATE: 18 BRPM | HEIGHT: 72 IN | OXYGEN SATURATION: 95 % | DIASTOLIC BLOOD PRESSURE: 60 MMHG | HEART RATE: 95 BPM | TEMPERATURE: 97 F | WEIGHT: 174.17 LBS | SYSTOLIC BLOOD PRESSURE: 90 MMHG

## 2023-01-27 DIAGNOSIS — Z01.818 ENCOUNTER FOR OTHER PREPROCEDURAL EXAMINATION: ICD-10-CM

## 2023-01-27 DIAGNOSIS — Z96.649 PRESENCE OF UNSPECIFIED ARTIFICIAL HIP JOINT: Chronic | ICD-10-CM

## 2023-01-27 DIAGNOSIS — Z95.810 PRESENCE OF AUTOMATIC (IMPLANTABLE) CARDIAC DEFIBRILLATOR: Chronic | ICD-10-CM

## 2023-01-27 DIAGNOSIS — Z98.890 OTHER SPECIFIED POSTPROCEDURAL STATES: Chronic | ICD-10-CM

## 2023-01-27 DIAGNOSIS — I73.9 PERIPHERAL VASCULAR DISEASE, UNSPECIFIED: Chronic | ICD-10-CM

## 2023-01-27 DIAGNOSIS — Z95.5 PRESENCE OF CORONARY ANGIOPLASTY IMPLANT AND GRAFT: Chronic | ICD-10-CM

## 2023-01-27 DIAGNOSIS — Z90.89 ACQUIRED ABSENCE OF OTHER ORGANS: Chronic | ICD-10-CM

## 2023-01-27 LAB
A1C WITH ESTIMATED AVERAGE GLUCOSE RESULT: 5.3 % — SIGNIFICANT CHANGE UP (ref 4–5.6)
ALBUMIN SERPL ELPH-MCNC: 4 G/DL — SIGNIFICANT CHANGE UP (ref 3.3–5.2)
ALP SERPL-CCNC: 144 U/L — HIGH (ref 40–120)
ALT FLD-CCNC: 9 U/L — SIGNIFICANT CHANGE UP
ANION GAP SERPL CALC-SCNC: 12 MMOL/L — SIGNIFICANT CHANGE UP (ref 5–17)
APTT BLD: 42.4 SEC — HIGH (ref 27.5–35.5)
AST SERPL-CCNC: 23 U/L — SIGNIFICANT CHANGE UP
BASOPHILS # BLD AUTO: 0.08 K/UL — SIGNIFICANT CHANGE UP (ref 0–0.2)
BASOPHILS NFR BLD AUTO: 0.8 % — SIGNIFICANT CHANGE UP (ref 0–2)
BILIRUB SERPL-MCNC: 0.4 MG/DL — SIGNIFICANT CHANGE UP (ref 0.4–2)
BLD GP AB SCN SERPL QL: SIGNIFICANT CHANGE UP
BUN SERPL-MCNC: 30 MG/DL — HIGH (ref 8–20)
CALCIUM SERPL-MCNC: 8.8 MG/DL — SIGNIFICANT CHANGE UP (ref 8.4–10.5)
CHLORIDE SERPL-SCNC: 103 MMOL/L — SIGNIFICANT CHANGE UP (ref 96–108)
CO2 SERPL-SCNC: 22 MMOL/L — SIGNIFICANT CHANGE UP (ref 22–29)
CREAT SERPL-MCNC: 1.67 MG/DL — HIGH (ref 0.5–1.3)
EGFR: 43 ML/MIN/1.73M2 — LOW
EOSINOPHIL # BLD AUTO: 0.45 K/UL — SIGNIFICANT CHANGE UP (ref 0–0.5)
EOSINOPHIL NFR BLD AUTO: 4.7 % — SIGNIFICANT CHANGE UP (ref 0–6)
ESTIMATED AVERAGE GLUCOSE: 105 MG/DL — SIGNIFICANT CHANGE UP (ref 68–114)
GLUCOSE SERPL-MCNC: 122 MG/DL — HIGH (ref 70–99)
HCT VFR BLD CALC: 41.9 % — SIGNIFICANT CHANGE UP (ref 39–50)
HGB BLD-MCNC: 13.6 G/DL — SIGNIFICANT CHANGE UP (ref 13–17)
IMM GRANULOCYTES NFR BLD AUTO: 0.3 % — SIGNIFICANT CHANGE UP (ref 0–0.9)
INR BLD: 1.19 RATIO — HIGH (ref 0.88–1.16)
LYMPHOCYTES # BLD AUTO: 2.09 K/UL — SIGNIFICANT CHANGE UP (ref 1–3.3)
LYMPHOCYTES # BLD AUTO: 21.7 % — SIGNIFICANT CHANGE UP (ref 13–44)
MCHC RBC-ENTMCNC: 29 PG — SIGNIFICANT CHANGE UP (ref 27–34)
MCHC RBC-ENTMCNC: 32.5 GM/DL — SIGNIFICANT CHANGE UP (ref 32–36)
MCV RBC AUTO: 89.3 FL — SIGNIFICANT CHANGE UP (ref 80–100)
MONOCYTES # BLD AUTO: 0.5 K/UL — SIGNIFICANT CHANGE UP (ref 0–0.9)
MONOCYTES NFR BLD AUTO: 5.2 % — SIGNIFICANT CHANGE UP (ref 2–14)
NEUTROPHILS # BLD AUTO: 6.49 K/UL — SIGNIFICANT CHANGE UP (ref 1.8–7.4)
NEUTROPHILS NFR BLD AUTO: 67.3 % — SIGNIFICANT CHANGE UP (ref 43–77)
PLATELET # BLD AUTO: 163 K/UL — SIGNIFICANT CHANGE UP (ref 150–400)
POTASSIUM SERPL-MCNC: 5.2 MMOL/L — SIGNIFICANT CHANGE UP (ref 3.5–5.3)
POTASSIUM SERPL-SCNC: 5.2 MMOL/L — SIGNIFICANT CHANGE UP (ref 3.5–5.3)
PROT SERPL-MCNC: 7.8 G/DL — SIGNIFICANT CHANGE UP (ref 6.6–8.7)
PROTHROM AB SERPL-ACNC: 13.8 SEC — HIGH (ref 10.5–13.4)
RBC # BLD: 4.69 M/UL — SIGNIFICANT CHANGE UP (ref 4.2–5.8)
RBC # FLD: 13.2 % — SIGNIFICANT CHANGE UP (ref 10.3–14.5)
SODIUM SERPL-SCNC: 137 MMOL/L — SIGNIFICANT CHANGE UP (ref 135–145)
WBC # BLD: 9.64 K/UL — SIGNIFICANT CHANGE UP (ref 3.8–10.5)
WBC # FLD AUTO: 9.64 K/UL — SIGNIFICANT CHANGE UP (ref 3.8–10.5)

## 2023-01-27 PROCEDURE — 71046 X-RAY EXAM CHEST 2 VIEWS: CPT | Mod: 26

## 2023-01-27 PROCEDURE — 71046 X-RAY EXAM CHEST 2 VIEWS: CPT

## 2023-01-27 PROCEDURE — G0463: CPT

## 2023-01-27 PROCEDURE — 93005 ELECTROCARDIOGRAM TRACING: CPT

## 2023-01-27 PROCEDURE — 93010 ELECTROCARDIOGRAM REPORT: CPT

## 2023-01-27 NOTE — H&P PST ADULT - NEGATIVE RESPIRATORY AND THORAX SYMPTOMS
Pending Prescriptions:                       Disp   Refills    lisinopril (ZESTRIL) 40 MG tablet         90 tab*0            Sig: TAKE 1 TABLET(40MG) BY MOUTH DAILY    Prescription approved per [facility] Refill Protocol.    
no wheezing/no cough/no hemoptysis/no pleuritic chest pain

## 2023-01-27 NOTE — H&P PST ADULT - NSANTHOSAYNRD_GEN_A_CORE
yt/No. DYAN screening performed.  STOP BANG Legend: 0-2 = LOW Risk; 3-4 = INTERMEDIATE Risk; 5-8 = HIGH Risk No. DYAN screening performed.  STOP BANG Legend: 0-2 = LOW Risk; 3-4 = INTERMEDIATE Risk; 5-8 = HIGH Risk

## 2023-01-27 NOTE — H&P PST ADULT - NSICDXPASTSURGICALHX_GEN_ALL_CORE_FT
PAST SURGICAL HISTORY:  Cardiac resynchronization therapy defibrillator (CRT-D) in place     History of bladder surgery     History of tonsillectomy     PVD (peripheral vascular disease) angiogram stents X2    S/P LASIK surgery     Status post THR (total hip replacement) right    Stented coronary artery

## 2023-01-27 NOTE — H&P PST ADULT - NSICDXPASTMEDICALHX_GEN_ALL_CORE_FT
PAST MEDICAL HISTORY:  AAA (abdominal aortic aneurysm) with mural thrombus    Bipolar 1 disorder     Bladder cancer 2009 s/p BCG; reoccurence 2021    CAD (coronary artery disease) s/p mLAD SHANTHI    Cataract     COPD (chronic obstructive pulmonary disease)     Frequent PVCs     H/O cardiomyopathy mixed cardiomyopahty s/p MDT  CRT-D    HFrEF (heart failure with reduced ejection fraction) EF 25-30% (echo 6/2020); EF 50-55% )ecgo 6/2021)    History of ETOH abuse sober x 20 years    History of syphilis     HLD (hyperlipidemia)     Left bundle branch block (LBBB)     Lung cancer in remission s/p chemo and XRT 2018    PVD (peripheral vascular disease) stents X2     PAST MEDICAL HISTORY:  2019 novel coronavirus disease (COVID-19)     AAA (abdominal aortic aneurysm) with mural thrombus    Bipolar 1 disorder     Bladder cancer 2009 s/p BCG; reoccurence 2021    CAD (coronary artery disease) s/p mLAD SHANTHI    Cataract     COPD (chronic obstructive pulmonary disease)     Frequent PVCs     H/O cardiomyopathy mixed cardiomyopahty s/p MDT  CRT-D    HFrEF (heart failure with reduced ejection fraction) EF 25-30% (echo 6/2020); EF 50-55% )ecgo 6/2021)    History of ETOH abuse sober x 20 years    History of syphilis     HLD (hyperlipidemia)     Left bundle branch block (LBBB)     Lung cancer in remission s/p chemo and XRT 2018    PVD (peripheral vascular disease) stents X2

## 2023-01-27 NOTE — H&P PST ADULT - ASSESSMENT
73 yr old male presents with history of arthritis,  COPD, PAD S/P PCI, MIXED CMP S/P CRT-D 2020 CAD s/p stenting ,LBBB  pacemaker ,  lung cancer  HAD CHEMO YEARS AGO IN REMISSION, bladder cancer  s/p excision no chemo, BIPOLAR  and  AAA.  Pt had aortic duplex 3/22/22 and infrarenal aorta measured 4.2 cm  at max diameter. pt denies any severe cp , back pain or abdominal pain  Pt smokes 1ppd , is on asa plavix and atorvastatin.  23  aorta duplex  5.4 x4.5 cm AAA distal aorta with layered thrombus , right iliac artery stent with endoleak , right common iliac artery aneurysm sac 4.2 cm x 3.6 cm  . pt is scheduled for aortogram with embolization of right hypogastric artery and possible right common iliac stent. covid PCR to be obtained 3- 5 days pre op , cardiac clearance with DR. Benito.   OPIOID RISK TOOL    GERARD EACH BOX THAT APPLIES AND ADD TOTALS AT THE END    FAMILY HISTORY OF SUBSTANCE ABUSE                 FEMALE         MALE                                                Alcohol                             [  ]1 pt          [  ]3pts                                               Illegal Durgs                     [  ]2 pts        [  ]3pts                                               Rx Drugs                           [  ]4 pts        [  ]4 pts    PERSONAL HISTORY OF SUBSTANCE ABUSE                                                                                          Alcohol                             [  ]3 pts       [ X ]3 pts                                               Illegal Durgs                     [  ]4 pts        [  ]4 pts                                               Rx Drugs                           [  ]5 pts        [  ]5 pts    AGE BETWEEN 16-45 YEARS                                      [  ]1 pt         [  ]1 pt    HISTORY OF PREADOLESCENT   SEXUAL ABUSE                                                             [  ]3 pts        [  ]0pts    PSYCHOLOGICAL DISEASE                     ADD, OCD, Bipolar, Schizophrenia        [  ]2 pts         [x  ]2 pts                      Depression                                               [  ]1 pt           [  ]1 pt           SCORING TOTAL   (add numbers and type here)              (*5**)                                     A score of 3 or lower indicated LOW risk for future opiod abuse  A score of 4 to 7 indicated moderate risk for future opiod abuse  A score of 8 or higher indicates a high risk for opiod abuse  CAPRINI VTE 2.0 SCORE [CLOT updated 2019]    AGE RELATED RISK FACTORS                                                       MOBILITY RELATED FACTORS  [ ] Age 41-60 years                                            (1 Point)                    [ ] Bed rest                                                        (1 Point)  X[ ] Age: 61-74 years                                           (2 Points)                  [ ] Plaster cast                                                   (2 Points)  [ ] Age= 75 years                                              (3 Points)                    [ ] Bed bound for more than 72 hours                 (2 Points)    DISEASE RELATED RISK FACTORS                                               GENDER SPECIFIC FACTORS  [ ] Edema in the lower extremities                       (1 Point)              [ ] Pregnancy                                                     (1 Point)  [ ] Varicose veins                                               (1 Point)                     [ ] Post-partum < 6 weeks                                   (1 Point)             [ ] BMI > 25 Kg/m2                                            (1 Point)                     [ ] Hormonal therapy  or oral contraception          (1 Point)                 [ ] Sepsis (in the previous month)                        (1 Point)               [ ] History of pregnancy complications                 (1 point)  [ ] Pneumonia or serious lung disease                                               [ ] Unexplained or recurrent                     (1 Point)           (in the previous month)                               (1 Point)  [ ] Abnormal pulmonary function test                     (1 Point)                 SURGERY RELATED RISK FACTORS  [ ] Acute myocardial infarction                              (1 Point)               [ ]  Section                                             (1 Point)  [ ] Congestive heart failure (in the previous month)  (1 Point)      [ ] Minor surgery                                                  (1 Point)   [ ] Inflammatory bowel disease                             (1 Point)               [ ] Arthroscopic surgery                                        (2 Points)  [ ] Central venous access                                      (2 Points)                [X ] General surgery lasting more than 45 minutes (2 points)  [X ] Malignancy- Present or previous                   (2 Points)                [ ] Elective arthroplasty                                         (5 points)    [ ] Stroke (in the previous month)                          (5 Points)                                                                                                                                                           HEMATOLOGY RELATED FACTORS                                                 TRAUMA RELATED RISK FACTORS  [ ] Prior episodes of VTE                                     (3 Points)                [ ] Fracture of the hip, pelvis, or leg                       (5 Points)  [ ] Positive family history for VTE                         (3 Points)             [ ] Acute spinal cord injury (in the previous month)  (5 Points)  [ ] Prothrombin 97261 A                                     (3 Points)               [ ] Paralysis  (less than 1 month)                             (5 Points)  [ ] Factor V Leiden                                             (3 Points)                  [ ] Multiple Trauma within 1 month                        (5 Points)  [ ] Lupus anticoagulants                                     (3 Points)                                                           [ ] Anticardiolipin antibodies                               (3 Points)                                                       [ ] High homocysteine in the blood                      (3 Points)                                             [ ] Other congenital or acquired thrombophilia      (3 Points)                                                [ ] Heparin induced thrombocytopenia                  (3 Points)                                     Total Score [ 6         ]

## 2023-01-27 NOTE — H&P PST ADULT - HISTORY OF PRESENT ILLNESS
73 yr old male presents with history of arthritis,  COPD, PAD S/P PCI, MIXED CMP S/P CRT-D 8/2020 CAD s/p stenting , pacemaker , LBBB, BIPOLAR ,  lung cancer  HAD CHEMO YEARS AGO IN REMISSION, bladder cancer 2021 s/p excision no chemo and  AAA.  Pt had aortic duplex 3/22/22 and infrarenal aorta measured 4.2 cm  at max diameter. pt denies any severe cp , back pain or abdominal pain  Pt smokes 1ppd , is on asa plavix and atorvastatin.  1/18/23  aorta duplex  5.4 x4.5 cm AAA distal aorta with layered thrombus , right iliac artery stent with endoleak , right common iliac artery aneurysm sac 4.2 cm x 3.6 cm  . pt is scheduled for aortogram with embolization of right hypogastric artery and possible right common iliac stent.

## 2023-01-30 DIAGNOSIS — E11.9 TYPE 2 DIABETES MELLITUS WITHOUT COMPLICATIONS: ICD-10-CM

## 2023-01-30 DIAGNOSIS — I73.9 PERIPHERAL VASCULAR DISEASE, UNSPECIFIED: ICD-10-CM

## 2023-01-30 DIAGNOSIS — U07.1 COVID-19: ICD-10-CM

## 2023-01-31 ENCOUNTER — APPOINTMENT (OUTPATIENT)
Dept: CARDIOLOGY | Facility: CLINIC | Age: 73
End: 2023-01-31
Payer: MEDICARE

## 2023-01-31 ENCOUNTER — NON-APPOINTMENT (OUTPATIENT)
Age: 73
End: 2023-01-31

## 2023-01-31 VITALS
OXYGEN SATURATION: 99 % | HEART RATE: 82 BPM | DIASTOLIC BLOOD PRESSURE: 40 MMHG | BODY MASS INDEX: 26.22 KG/M2 | WEIGHT: 177 LBS | RESPIRATION RATE: 16 BRPM | SYSTOLIC BLOOD PRESSURE: 100 MMHG | HEIGHT: 69 IN

## 2023-01-31 DIAGNOSIS — Z95.810 PRESENCE OF AUTOMATIC (IMPLANTABLE) CARDIAC DEFIBRILLATOR: ICD-10-CM

## 2023-01-31 DIAGNOSIS — Z13.89 ENCOUNTER FOR SCREENING FOR OTHER DISORDER: ICD-10-CM

## 2023-01-31 DIAGNOSIS — Z29.9 ENCOUNTER FOR PROPHYLACTIC MEASURES, UNSPECIFIED: ICD-10-CM

## 2023-01-31 DIAGNOSIS — Z98.890 OTHER SPECIFIED POSTPROCEDURAL STATES: ICD-10-CM

## 2023-01-31 PROBLEM — I44.7 LEFT BUNDLE-BRANCH BLOCK, UNSPECIFIED: Chronic | Status: ACTIVE | Noted: 2023-01-30

## 2023-01-31 PROCEDURE — 93000 ELECTROCARDIOGRAM COMPLETE: CPT | Mod: NC

## 2023-01-31 PROCEDURE — 99214 OFFICE O/P EST MOD 30 MIN: CPT

## 2023-01-31 NOTE — DISCUSSION/SUMMARY
[EKG obtained to assist in diagnosis and management of assessed problem(s)] : EKG obtained to assist in diagnosis and management of assessed problem(s) [FreeTextEntry1] : Patient is a 73 yo M with tobacco dependence, HLD, COPD, aortic atherosclerosis, AAA with mural thrombus,  PAD, CAD s/p PCI LAD 2/2020, LBBB, mixed CMP,  CRT-D fall 2020 here for cardiac follow up.  \par \par \par -Testing has showed severe CMP that is multifactorial, does have CAD and s/p PCI but no improvement in CMP. Maybe related to prior EtOH and substance abuse years ago, possibly genetic. No myocarditis. Also significant PVCs and improved with Amio therapy with better % CRT pacing from PVC suppression\par \par -PHARM nuclear stress test 1/2022 without ischemia, infarct inferiorly appears smaller and EF improved. Interestingly CAd was all LAD and not RCA. ? if scar/defect was all non-ischemic and has improved with meds/CRT and PVC ablation. Still with RVE \par \par -s/p PVC ablation 10/2021 and doing well, prior had been on amio\par \par -Echo 6/2022 with significant improvement in LV function, EF 55%. \par \par -Recurrent bladder cancer 6/2021 and s/p excision without need for chemo\par \par -Follows with Dr Crump for significant PAD. Has a 5.4cm infrarenal AAA with layered thrombus and right iliac artery stent with endocleak. Right common iliac artery aneurysm sac 4.2c x 3.6cm. Planned for aortogram with embolization of right hypogastric artery and possible RCIA stent. Also planned for endovascular repair of AAA. \par \par \par 1. CV stable at moderate risk for vascular procedures/surgery \par 2. Surveillance of CAD and continue med management \par 3. DAPT and statin\par 4. Continue to monitor for PAF, if recurrence will need to re-assess A/C but none recently. \par 5. continue current doses carvedilol/entresto, no BP room to add MRA or farxiga at this time and EF better\par 6. Increase physical activity as tolerated \par 7. CRT check at routing follow up same day as OV with me next month\par 8. Echo at follow up as well to evaluate CMP\par 9. NO medication changes. will continue midodrine daily for now and advised take it day of procedure. Consider weaning further at follow up \par 10. Has also cut clonazepam to 1mg at night which has helped

## 2023-01-31 NOTE — HISTORY OF PRESENT ILLNESS
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  On disability for lung cancer, remains in remission. Had chemotherapy years ago. Also had bladder cancer 2021 s/p excision without need for chemo. \par \par  Was noted to be having increasing STEVENSON, noted by his wife in fall 2019.. Had echo and stress testing, found to have severe CMP and coreg/entresto started. Underwent cath as well and  s/p LAD stent 2/2020. Underwent CRT-D subsequently due to no improvement in EF\par \par Patient  underwent catheter ablation of PVCs. Had initially been tried on Amio with improvement in PVC burden, better BiV pacing and improved EF. Stopped due to side effects and now s/p ablation with Dr. Peters. \par \par Was in [place] fall 2022 for COVID, BP lower than baseline and meds cut back. Was in [place] now home. \par \par \par States feels at baseline now. Strength good and no CP/SOB. Patient denies PND/orthopnea/edema/palpitations/syncope/claudication. Carvedilol lowerd last OV and goal to wean off midodrine. 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 Lives with his wife\par  \par ROS: GI and  negative

## 2023-02-02 ENCOUNTER — APPOINTMENT (OUTPATIENT)
Dept: CT IMAGING | Facility: CLINIC | Age: 73
End: 2023-02-02
Payer: MEDICARE

## 2023-02-02 ENCOUNTER — OUTPATIENT (OUTPATIENT)
Dept: OUTPATIENT SERVICES | Facility: HOSPITAL | Age: 73
LOS: 1 days | End: 2023-02-02
Payer: MEDICARE

## 2023-02-02 DIAGNOSIS — I71.40 ABDOMINAL AORTIC ANEURYSM, WITHOUT RUPTURE, UNSPECIFIED: ICD-10-CM

## 2023-02-02 PROCEDURE — 74174 CTA ABD&PLVS W/CONTRAST: CPT

## 2023-02-02 PROCEDURE — 74174 CTA ABD&PLVS W/CONTRAST: CPT | Mod: 26,MH

## 2023-02-07 ENCOUNTER — APPOINTMENT (OUTPATIENT)
Dept: CARDIOLOGY | Facility: CLINIC | Age: 73
End: 2023-02-07
Payer: MEDICARE

## 2023-02-07 PROCEDURE — 93306 TTE W/DOPPLER COMPLETE: CPT

## 2023-02-15 ENCOUNTER — TRANSCRIPTION ENCOUNTER (OUTPATIENT)
Age: 73
End: 2023-02-15

## 2023-02-15 NOTE — ASU PATIENT PROFILE, ADULT - NS PREOP MEDICATION GIVEN
Discussed viral vs bacterial infections  ABX as ordered  Diflucan as ordered for yeast infection  Salt water gargles as directed for sore throat  Call/return with any problems or concerns    Schedule appointment for annual physical yes

## 2023-02-16 ENCOUNTER — INPATIENT (INPATIENT)
Facility: HOSPITAL | Age: 73
LOS: 0 days | Discharge: HOME | DRG: 269 | End: 2023-02-17
Attending: SURGERY | Admitting: SURGERY
Payer: MEDICARE

## 2023-02-16 ENCOUNTER — APPOINTMENT (OUTPATIENT)
Dept: VASCULAR SURGERY | Facility: HOSPITAL | Age: 73
End: 2023-02-16

## 2023-02-16 ENCOUNTER — TRANSCRIPTION ENCOUNTER (OUTPATIENT)
Age: 73
End: 2023-02-16

## 2023-02-16 VITALS
OXYGEN SATURATION: 97 % | HEIGHT: 72 IN | DIASTOLIC BLOOD PRESSURE: 80 MMHG | RESPIRATION RATE: 20 BRPM | WEIGHT: 174.39 LBS | HEART RATE: 82 BPM | SYSTOLIC BLOOD PRESSURE: 114 MMHG | TEMPERATURE: 98 F

## 2023-02-16 DIAGNOSIS — I73.9 PERIPHERAL VASCULAR DISEASE, UNSPECIFIED: Chronic | ICD-10-CM

## 2023-02-16 DIAGNOSIS — Z98.890 OTHER SPECIFIED POSTPROCEDURAL STATES: Chronic | ICD-10-CM

## 2023-02-16 DIAGNOSIS — I71.40 ABDOMINAL AORTIC ANEURYSM, WITHOUT RUPTURE, UNSPECIFIED: ICD-10-CM

## 2023-02-16 DIAGNOSIS — Z95.810 PRESENCE OF AUTOMATIC (IMPLANTABLE) CARDIAC DEFIBRILLATOR: Chronic | ICD-10-CM

## 2023-02-16 DIAGNOSIS — Z95.5 PRESENCE OF CORONARY ANGIOPLASTY IMPLANT AND GRAFT: Chronic | ICD-10-CM

## 2023-02-16 DIAGNOSIS — Z96.649 PRESENCE OF UNSPECIFIED ARTIFICIAL HIP JOINT: Chronic | ICD-10-CM

## 2023-02-16 DIAGNOSIS — Z90.89 ACQUIRED ABSENCE OF OTHER ORGANS: Chronic | ICD-10-CM

## 2023-02-16 LAB
ALBUMIN SERPL ELPH-MCNC: 3.5 G/DL — SIGNIFICANT CHANGE UP (ref 3.3–5.2)
ALP SERPL-CCNC: 107 U/L — SIGNIFICANT CHANGE UP (ref 40–120)
ALT FLD-CCNC: 10 U/L — SIGNIFICANT CHANGE UP
ANION GAP SERPL CALC-SCNC: 11 MMOL/L — SIGNIFICANT CHANGE UP (ref 5–17)
APPEARANCE UR: CLEAR — SIGNIFICANT CHANGE UP
APTT BLD: 40.6 SEC — HIGH (ref 27.5–35.5)
APTT BLD: 47.4 SEC — HIGH (ref 27.5–35.5)
AST SERPL-CCNC: 16 U/L — SIGNIFICANT CHANGE UP
BACTERIA # UR AUTO: ABNORMAL
BASOPHILS # BLD AUTO: 0.05 K/UL — SIGNIFICANT CHANGE UP (ref 0–0.2)
BASOPHILS NFR BLD AUTO: 0.5 % — SIGNIFICANT CHANGE UP (ref 0–2)
BILIRUB SERPL-MCNC: 0.5 MG/DL — SIGNIFICANT CHANGE UP (ref 0.4–2)
BILIRUB UR-MCNC: NEGATIVE — SIGNIFICANT CHANGE UP
BLD GP AB SCN SERPL QL: SIGNIFICANT CHANGE UP
BUN SERPL-MCNC: 18 MG/DL — SIGNIFICANT CHANGE UP (ref 8–20)
CALCIUM SERPL-MCNC: 9.6 MG/DL — SIGNIFICANT CHANGE UP (ref 8.4–10.5)
CHLORIDE SERPL-SCNC: 112 MMOL/L — HIGH (ref 96–108)
CO2 SERPL-SCNC: 18 MMOL/L — LOW (ref 22–29)
COLOR SPEC: YELLOW — SIGNIFICANT CHANGE UP
CREAT SERPL-MCNC: 1.14 MG/DL — SIGNIFICANT CHANGE UP (ref 0.5–1.3)
DIFF PNL FLD: ABNORMAL
EGFR: 67 ML/MIN/1.73M2 — SIGNIFICANT CHANGE UP
EOSINOPHIL # BLD AUTO: 0.27 K/UL — SIGNIFICANT CHANGE UP (ref 0–0.5)
EOSINOPHIL NFR BLD AUTO: 2.7 % — SIGNIFICANT CHANGE UP (ref 0–6)
EPI CELLS # UR: SIGNIFICANT CHANGE UP
GAS PNL BLDA: SIGNIFICANT CHANGE UP
GLUCOSE SERPL-MCNC: 131 MG/DL — HIGH (ref 70–99)
GLUCOSE UR QL: NEGATIVE MG/DL — SIGNIFICANT CHANGE UP
HCT VFR BLD CALC: 36.2 % — LOW (ref 39–50)
HGB BLD-MCNC: 11.9 G/DL — LOW (ref 13–17)
IMM GRANULOCYTES NFR BLD AUTO: 0.6 % — SIGNIFICANT CHANGE UP (ref 0–0.9)
INR BLD: 1.15 RATIO — SIGNIFICANT CHANGE UP (ref 0.88–1.16)
INR BLD: 1.23 RATIO — HIGH (ref 0.88–1.16)
KETONES UR-MCNC: ABNORMAL
LEUKOCYTE ESTERASE UR-ACNC: NEGATIVE — SIGNIFICANT CHANGE UP
LYMPHOCYTES # BLD AUTO: 1.17 K/UL — SIGNIFICANT CHANGE UP (ref 1–3.3)
LYMPHOCYTES # BLD AUTO: 11.8 % — LOW (ref 13–44)
MCHC RBC-ENTMCNC: 29 PG — SIGNIFICANT CHANGE UP (ref 27–34)
MCHC RBC-ENTMCNC: 32.9 GM/DL — SIGNIFICANT CHANGE UP (ref 32–36)
MCV RBC AUTO: 88.1 FL — SIGNIFICANT CHANGE UP (ref 80–100)
MONOCYTES # BLD AUTO: 0.17 K/UL — SIGNIFICANT CHANGE UP (ref 0–0.9)
MONOCYTES NFR BLD AUTO: 1.7 % — LOW (ref 2–14)
NEUTROPHILS # BLD AUTO: 8.17 K/UL — HIGH (ref 1.8–7.4)
NEUTROPHILS NFR BLD AUTO: 82.7 % — HIGH (ref 43–77)
NITRITE UR-MCNC: NEGATIVE — SIGNIFICANT CHANGE UP
PH UR: 6 — SIGNIFICANT CHANGE UP (ref 5–8)
PLATELET # BLD AUTO: 125 K/UL — LOW (ref 150–400)
POTASSIUM SERPL-MCNC: 4.7 MMOL/L — SIGNIFICANT CHANGE UP (ref 3.5–5.3)
POTASSIUM SERPL-SCNC: 4.7 MMOL/L — SIGNIFICANT CHANGE UP (ref 3.5–5.3)
PROT SERPL-MCNC: 6.5 G/DL — LOW (ref 6.6–8.7)
PROT UR-MCNC: NEGATIVE — SIGNIFICANT CHANGE UP
PROTHROM AB SERPL-ACNC: 13.4 SEC — SIGNIFICANT CHANGE UP (ref 10.5–13.4)
PROTHROM AB SERPL-ACNC: 14.3 SEC — HIGH (ref 10.5–13.4)
RBC # BLD: 4.11 M/UL — LOW (ref 4.2–5.8)
RBC # FLD: 13.9 % — SIGNIFICANT CHANGE UP (ref 10.3–14.5)
RBC CASTS # UR COMP ASSIST: ABNORMAL /HPF (ref 0–4)
SODIUM SERPL-SCNC: 141 MMOL/L — SIGNIFICANT CHANGE UP (ref 135–145)
SP GR SPEC: 1.01 — SIGNIFICANT CHANGE UP (ref 1.01–1.02)
UROBILINOGEN FLD QL: NEGATIVE MG/DL — SIGNIFICANT CHANGE UP
WBC # BLD: 9.89 K/UL — SIGNIFICANT CHANGE UP (ref 3.8–10.5)
WBC # FLD AUTO: 9.89 K/UL — SIGNIFICANT CHANGE UP (ref 3.8–10.5)
WBC UR QL: SIGNIFICANT CHANGE UP /HPF (ref 0–5)

## 2023-02-16 PROCEDURE — 93010 ELECTROCARDIOGRAM REPORT: CPT

## 2023-02-16 DEVICE — BLLN MUSTANG 10X80MMX75CM: Type: IMPLANTABLE DEVICE | Status: FUNCTIONAL

## 2023-02-16 DEVICE — SUT PERCLOSE PROGLIDE 6FR: Type: IMPLANTABLE DEVICE | Status: FUNCTIONAL

## 2023-02-16 DEVICE — GWIRE VASC ENTRY MINISTICK MAX 5FR 10CM: Type: IMPLANTABLE DEVICE | Status: FUNCTIONAL

## 2023-02-16 DEVICE — SHEATH INTRODUCER TERUMO PINNACLE PERIPHERAL 9FR X 10CM X 0.035" MINI WIRE: Type: IMPLANTABLE DEVICE | Status: FUNCTIONAL

## 2023-02-16 DEVICE — DRYSEAL FLEX INTRO SHEATH 12FR 33CM: Type: IMPLANTABLE DEVICE | Status: FUNCTIONAL

## 2023-02-16 DEVICE — CATH ANGIO GLIDECATH ANGLE TAPER 5FR X 65CM: Type: IMPLANTABLE DEVICE | Status: FUNCTIONAL

## 2023-02-16 DEVICE — KIT A-LINE 1LUM 20G X 12CM SAFE KIT: Type: IMPLANTABLE DEVICE | Status: FUNCTIONAL

## 2023-02-16 DEVICE — STENT VASC GRAFT ENDURANT II 28MM: Type: IMPLANTABLE DEVICE | Status: FUNCTIONAL

## 2023-02-16 DEVICE — SHEATH INTRODUCER TERUMO PINNACLE PERIPHERAL 11FR X 10CM X 0.035" MINI WIRE: Type: IMPLANTABLE DEVICE | Status: FUNCTIONAL

## 2023-02-16 DEVICE — SHEATH INTRODUCER TERUMO PINNACLE PERIPHERAL 6FR X 10CM X 0.035" MINI WIRE: Type: IMPLANTABLE DEVICE | Status: FUNCTIONAL

## 2023-02-16 DEVICE — CATH 5FR PIGTAIL 70CM LONG: Type: IMPLANTABLE DEVICE | Status: FUNCTIONAL

## 2023-02-16 DEVICE — SHEATH INTRO DRYSEAL FLEX 14FR 33CM: Type: IMPLANTABLE DEVICE | Status: FUNCTIONAL

## 2023-02-16 DEVICE — GUIDEWIRE LUNDERQUIST EXTRA-STIFF DOUBLE CURVED .035" X 260CM: Type: IMPLANTABLE DEVICE | Status: FUNCTIONAL

## 2023-02-16 DEVICE — IMPLANTABLE DEVICE: Type: IMPLANTABLE DEVICE | Status: FUNCTIONAL

## 2023-02-16 DEVICE — CATH BLLN RELIANT STENT 12X10-46MM: Type: IMPLANTABLE DEVICE | Status: FUNCTIONAL

## 2023-02-16 RX ORDER — ONDANSETRON 8 MG/1
4 TABLET, FILM COATED ORAL ONCE
Refills: 0 | Status: DISCONTINUED | OUTPATIENT
Start: 2023-02-16 | End: 2023-02-16

## 2023-02-16 RX ORDER — PANTOPRAZOLE SODIUM 20 MG/1
40 TABLET, DELAYED RELEASE ORAL
Refills: 0 | Status: DISCONTINUED | OUTPATIENT
Start: 2023-02-16 | End: 2023-02-17

## 2023-02-16 RX ORDER — SODIUM CHLORIDE 9 MG/ML
1000 INJECTION, SOLUTION INTRAVENOUS
Refills: 0 | Status: DISCONTINUED | OUTPATIENT
Start: 2023-02-16 | End: 2023-02-16

## 2023-02-16 RX ORDER — SACUBITRIL AND VALSARTAN 24; 26 MG/1; MG/1
1 TABLET, FILM COATED ORAL
Qty: 0 | Refills: 0 | DISCHARGE

## 2023-02-16 RX ORDER — HYDROMORPHONE HYDROCHLORIDE 2 MG/ML
0.5 INJECTION INTRAMUSCULAR; INTRAVENOUS; SUBCUTANEOUS EVERY 4 HOURS
Refills: 0 | Status: DISCONTINUED | OUTPATIENT
Start: 2023-02-16 | End: 2023-02-17

## 2023-02-16 RX ORDER — FLUPHENAZINE HYDROCHLORIDE 1 MG/1
2.5 TABLET, FILM COATED ORAL DAILY
Refills: 0 | Status: DISCONTINUED | OUTPATIENT
Start: 2023-02-16 | End: 2023-02-17

## 2023-02-16 RX ORDER — TRAMADOL HYDROCHLORIDE 50 MG/1
25 TABLET ORAL EVERY 6 HOURS
Refills: 0 | Status: DISCONTINUED | OUTPATIENT
Start: 2023-02-16 | End: 2023-02-16

## 2023-02-16 RX ORDER — CLONAZEPAM 1 MG
2 TABLET ORAL AT BEDTIME
Refills: 0 | Status: DISCONTINUED | OUTPATIENT
Start: 2023-02-16 | End: 2023-02-17

## 2023-02-16 RX ORDER — ATORVASTATIN CALCIUM 80 MG/1
20 TABLET, FILM COATED ORAL AT BEDTIME
Refills: 0 | Status: DISCONTINUED | OUTPATIENT
Start: 2023-02-16 | End: 2023-02-17

## 2023-02-16 RX ORDER — SODIUM CHLORIDE 9 MG/ML
3 INJECTION INTRAMUSCULAR; INTRAVENOUS; SUBCUTANEOUS ONCE
Refills: 0 | Status: DISCONTINUED | OUTPATIENT
Start: 2023-02-16 | End: 2023-02-16

## 2023-02-16 RX ORDER — ENOXAPARIN SODIUM 100 MG/ML
40 INJECTION SUBCUTANEOUS EVERY 24 HOURS
Refills: 0 | Status: DISCONTINUED | OUTPATIENT
Start: 2023-02-16 | End: 2023-02-16

## 2023-02-16 RX ORDER — CEFAZOLIN SODIUM 1 G
2000 VIAL (EA) INJECTION ONCE
Refills: 0 | Status: DISCONTINUED | OUTPATIENT
Start: 2023-02-16 | End: 2023-02-16

## 2023-02-16 RX ORDER — HEPARIN SODIUM 5000 [USP'U]/ML
5000 INJECTION INTRAVENOUS; SUBCUTANEOUS EVERY 8 HOURS
Refills: 0 | Status: DISCONTINUED | OUTPATIENT
Start: 2023-02-16 | End: 2023-02-17

## 2023-02-16 RX ORDER — SACUBITRIL AND VALSARTAN 24; 26 MG/1; MG/1
1 TABLET, FILM COATED ORAL
Refills: 0 | Status: DISCONTINUED | OUTPATIENT
Start: 2023-02-16 | End: 2023-02-17

## 2023-02-16 RX ORDER — HYDROMORPHONE HYDROCHLORIDE 2 MG/ML
0.5 INJECTION INTRAMUSCULAR; INTRAVENOUS; SUBCUTANEOUS
Refills: 0 | Status: DISCONTINUED | OUTPATIENT
Start: 2023-02-16 | End: 2023-02-16

## 2023-02-16 RX ORDER — CLOPIDOGREL BISULFATE 75 MG/1
75 TABLET, FILM COATED ORAL DAILY
Refills: 0 | Status: DISCONTINUED | OUTPATIENT
Start: 2023-02-16 | End: 2023-02-17

## 2023-02-16 RX ORDER — ACETAMINOPHEN 500 MG
975 TABLET ORAL EVERY 6 HOURS
Refills: 0 | Status: DISCONTINUED | OUTPATIENT
Start: 2023-02-16 | End: 2023-02-17

## 2023-02-16 RX ORDER — CARVEDILOL PHOSPHATE 80 MG/1
25 CAPSULE, EXTENDED RELEASE ORAL EVERY 12 HOURS
Refills: 0 | Status: DISCONTINUED | OUTPATIENT
Start: 2023-02-16 | End: 2023-02-17

## 2023-02-16 RX ORDER — ASPIRIN/CALCIUM CARB/MAGNESIUM 324 MG
81 TABLET ORAL DAILY
Refills: 0 | Status: DISCONTINUED | OUTPATIENT
Start: 2023-02-16 | End: 2023-02-17

## 2023-02-16 RX ADMIN — CARVEDILOL PHOSPHATE 25 MILLIGRAM(S): 80 CAPSULE, EXTENDED RELEASE ORAL at 22:29

## 2023-02-16 RX ADMIN — HEPARIN SODIUM 5000 UNIT(S): 5000 INJECTION INTRAVENOUS; SUBCUTANEOUS at 22:30

## 2023-02-16 RX ADMIN — ATORVASTATIN CALCIUM 20 MILLIGRAM(S): 80 TABLET, FILM COATED ORAL at 22:29

## 2023-02-16 RX ADMIN — Medication 975 MILLIGRAM(S): at 23:32

## 2023-02-16 RX ADMIN — Medication 2 MILLIGRAM(S): at 22:28

## 2023-02-16 RX ADMIN — SACUBITRIL AND VALSARTAN 1 TABLET(S): 24; 26 TABLET, FILM COATED ORAL at 22:28

## 2023-02-16 NOTE — BRIEF OPERATIVE NOTE - OPERATION/FINDINGS
EVAR , 10 min pressure applied on both groin , skin closed by Monocryl , no hematoma/bleeding postop     RLE doppler DP   LLE doppler DP/PT

## 2023-02-16 NOTE — BRIEF OPERATIVE NOTE - NSICDXBRIEFPROCEDURE_GEN_ALL_CORE_FT
PROCEDURES:  Endovascular repair of abdominal aortic aneurysm with visceral vessel 16-Feb-2023 16:59:10  Venus Boss

## 2023-02-16 NOTE — CHART NOTE - NSCHARTNOTEFT_GEN_A_CORE
Post-op Check    Subjective:  Pt offers no acute complaints at this time. Pain well controlled on current regiment. Denies nausea, vomiting, chest pain, SOB, palpitations.     MEDICATIONS  (STANDING):  acetaminophen     Tablet .. 975 milliGRAM(s) Oral every 6 hours  aspirin  chewable 81 milliGRAM(s) Oral daily  atorvastatin 20 milliGRAM(s) Oral at bedtime  carvedilol 25 milliGRAM(s) Oral every 12 hours  clonazePAM  Tablet 2 milliGRAM(s) Oral at bedtime  clopidogrel Tablet 75 milliGRAM(s) Oral daily  fluPHENAZine 2.5 milliGRAM(s) Oral daily  heparin   Injectable 5000 Unit(s) SubCutaneous every 8 hours  pantoprazole    Tablet 40 milliGRAM(s) Oral before breakfast  sacubitril 24 mG/valsartan 26 mG 1 Tablet(s) Oral two times a day    MEDICATIONS  (PRN):  HYDROmorphone  Injectable 0.5 milliGRAM(s) IV Push every 4 hours PRN Breakthrough  traMADol 25 milliGRAM(s) Oral every 6 hours PRN Severe Pain (7 - 10)      Vital Signs Last 24 Hrs  T(C): 36.3 (17 Feb 2023 00:19), Max: 36.7 (16 Feb 2023 12:31)  T(F): 97.4 (17 Feb 2023 00:19), Max: 98 (16 Feb 2023 12:31)  HR: 74 (17 Feb 2023 00:19) (72 - 84)  BP: 122/75 (17 Feb 2023 00:19) (114/80 - 152/92)  BP(mean): 95 (16 Feb 2023 18:15) (87 - 95)  RR: 17 (17 Feb 2023 00:19) (10 - 20)  SpO2: 96% (17 Feb 2023 00:19) (95% - 100%)    Parameters below as of 16 Feb 2023 20:20  Patient On (Oxygen Delivery Method): room air        Physical Exam:    General: Laying comfortably in bed, NAD  HEENT: PERRL, EOMI  Neck: No JVD, FROM without pain  Respiratory: no accessory muscle use, respirations non-labored  Abdomen: groin is soft, dressing are intact with no strikethrough  Neurological: A&O x 3; without gross deficit    A: Armata s/p EVAR doing well post op.     P:  Continue current care  Pain control  OOB as tolerated  Encourage IS  DVT ppx  carrera removal tomorrow   potential dc tomorrow  monitor vital

## 2023-02-16 NOTE — ASU PREOP CHECKLIST - IV STARTED
labs sent, anesthesia to obtain, pt was on chemo, sclerotic veins/no labs sent, anesthesia to obtain, pt was on chemo, sclerotic veins/yes

## 2023-02-17 ENCOUNTER — TRANSCRIPTION ENCOUNTER (OUTPATIENT)
Age: 73
End: 2023-02-17

## 2023-02-17 VITALS
DIASTOLIC BLOOD PRESSURE: 62 MMHG | RESPIRATION RATE: 18 BRPM | TEMPERATURE: 98 F | HEART RATE: 86 BPM | SYSTOLIC BLOOD PRESSURE: 94 MMHG | OXYGEN SATURATION: 92 %

## 2023-02-17 LAB
ANION GAP SERPL CALC-SCNC: 10 MMOL/L — SIGNIFICANT CHANGE UP (ref 5–17)
BUN SERPL-MCNC: 24.5 MG/DL — HIGH (ref 8–20)
CALCIUM SERPL-MCNC: 9.1 MG/DL — SIGNIFICANT CHANGE UP (ref 8.4–10.5)
CHLORIDE SERPL-SCNC: 109 MMOL/L — HIGH (ref 96–108)
CO2 SERPL-SCNC: 19 MMOL/L — LOW (ref 22–29)
CREAT SERPL-MCNC: 1.5 MG/DL — HIGH (ref 0.5–1.3)
EGFR: 49 ML/MIN/1.73M2 — LOW
GLUCOSE SERPL-MCNC: 162 MG/DL — HIGH (ref 70–99)
HCT VFR BLD CALC: 39.4 % — SIGNIFICANT CHANGE UP (ref 39–50)
HGB BLD-MCNC: 12.9 G/DL — LOW (ref 13–17)
MCHC RBC-ENTMCNC: 29.1 PG — SIGNIFICANT CHANGE UP (ref 27–34)
MCHC RBC-ENTMCNC: 32.7 GM/DL — SIGNIFICANT CHANGE UP (ref 32–36)
MCV RBC AUTO: 88.7 FL — SIGNIFICANT CHANGE UP (ref 80–100)
PLATELET # BLD AUTO: 130 K/UL — LOW (ref 150–400)
POTASSIUM SERPL-MCNC: 5.3 MMOL/L — SIGNIFICANT CHANGE UP (ref 3.5–5.3)
POTASSIUM SERPL-SCNC: 5.3 MMOL/L — SIGNIFICANT CHANGE UP (ref 3.5–5.3)
RBC # BLD: 4.44 M/UL — SIGNIFICANT CHANGE UP (ref 4.2–5.8)
RBC # FLD: 13.5 % — SIGNIFICANT CHANGE UP (ref 10.3–14.5)
SODIUM SERPL-SCNC: 138 MMOL/L — SIGNIFICANT CHANGE UP (ref 135–145)
WBC # BLD: 9.56 K/UL — SIGNIFICANT CHANGE UP (ref 3.8–10.5)
WBC # FLD AUTO: 9.56 K/UL — SIGNIFICANT CHANGE UP (ref 3.8–10.5)

## 2023-02-17 PROCEDURE — 84295 ASSAY OF SERUM SODIUM: CPT

## 2023-02-17 PROCEDURE — 84132 ASSAY OF SERUM POTASSIUM: CPT

## 2023-02-17 PROCEDURE — 86901 BLOOD TYPING SEROLOGIC RH(D): CPT

## 2023-02-17 PROCEDURE — 86900 BLOOD TYPING SEROLOGIC ABO: CPT

## 2023-02-17 PROCEDURE — 85027 COMPLETE CBC AUTOMATED: CPT

## 2023-02-17 PROCEDURE — 85610 PROTHROMBIN TIME: CPT

## 2023-02-17 PROCEDURE — C1760: CPT

## 2023-02-17 PROCEDURE — 86850 RBC ANTIBODY SCREEN: CPT

## 2023-02-17 PROCEDURE — C1725: CPT

## 2023-02-17 PROCEDURE — 82330 ASSAY OF CALCIUM: CPT

## 2023-02-17 PROCEDURE — 80048 BASIC METABOLIC PNL TOTAL CA: CPT

## 2023-02-17 PROCEDURE — 82435 ASSAY OF BLOOD CHLORIDE: CPT

## 2023-02-17 PROCEDURE — 82947 ASSAY GLUCOSE BLOOD QUANT: CPT

## 2023-02-17 PROCEDURE — 82803 BLOOD GASES ANY COMBINATION: CPT

## 2023-02-17 PROCEDURE — 85025 COMPLETE CBC W/AUTO DIFF WBC: CPT

## 2023-02-17 PROCEDURE — C1874: CPT

## 2023-02-17 PROCEDURE — 36415 COLL VENOUS BLD VENIPUNCTURE: CPT

## 2023-02-17 PROCEDURE — 83605 ASSAY OF LACTIC ACID: CPT

## 2023-02-17 PROCEDURE — 81001 URINALYSIS AUTO W/SCOPE: CPT

## 2023-02-17 PROCEDURE — C1887: CPT

## 2023-02-17 PROCEDURE — C1894: CPT

## 2023-02-17 PROCEDURE — C1769: CPT

## 2023-02-17 PROCEDURE — 93005 ELECTROCARDIOGRAM TRACING: CPT

## 2023-02-17 PROCEDURE — 85018 HEMOGLOBIN: CPT

## 2023-02-17 PROCEDURE — 85014 HEMATOCRIT: CPT

## 2023-02-17 PROCEDURE — 85730 THROMBOPLASTIN TIME PARTIAL: CPT

## 2023-02-17 PROCEDURE — 80053 COMPREHEN METABOLIC PANEL: CPT

## 2023-02-17 RX ORDER — SODIUM CHLORIDE 9 MG/ML
1000 INJECTION, SOLUTION INTRAVENOUS
Refills: 0 | Status: DISCONTINUED | OUTPATIENT
Start: 2023-02-17 | End: 2023-02-17

## 2023-02-17 RX ADMIN — CARVEDILOL PHOSPHATE 25 MILLIGRAM(S): 80 CAPSULE, EXTENDED RELEASE ORAL at 05:33

## 2023-02-17 RX ADMIN — FLUPHENAZINE HYDROCHLORIDE 2.5 MILLIGRAM(S): 1 TABLET, FILM COATED ORAL at 12:36

## 2023-02-17 RX ADMIN — SACUBITRIL AND VALSARTAN 1 TABLET(S): 24; 26 TABLET, FILM COATED ORAL at 05:32

## 2023-02-17 RX ADMIN — Medication 975 MILLIGRAM(S): at 00:00

## 2023-02-17 RX ADMIN — PANTOPRAZOLE SODIUM 40 MILLIGRAM(S): 20 TABLET, DELAYED RELEASE ORAL at 05:33

## 2023-02-17 RX ADMIN — Medication 81 MILLIGRAM(S): at 12:36

## 2023-02-17 RX ADMIN — Medication 975 MILLIGRAM(S): at 05:33

## 2023-02-17 RX ADMIN — HEPARIN SODIUM 5000 UNIT(S): 5000 INJECTION INTRAVENOUS; SUBCUTANEOUS at 12:36

## 2023-02-17 RX ADMIN — Medication 975 MILLIGRAM(S): at 12:36

## 2023-02-17 RX ADMIN — Medication 975 MILLIGRAM(S): at 13:21

## 2023-02-17 RX ADMIN — CLOPIDOGREL BISULFATE 75 MILLIGRAM(S): 75 TABLET, FILM COATED ORAL at 12:37

## 2023-02-17 RX ADMIN — HEPARIN SODIUM 5000 UNIT(S): 5000 INJECTION INTRAVENOUS; SUBCUTANEOUS at 05:33

## 2023-02-17 RX ADMIN — SODIUM CHLORIDE 110 MILLILITER(S): 9 INJECTION, SOLUTION INTRAVENOUS at 07:10

## 2023-02-17 RX ADMIN — Medication 975 MILLIGRAM(S): at 06:00

## 2023-02-17 NOTE — DISCHARGE NOTE PROVIDER - HOSPITAL COURSE
73 yr old male presents with history of arthritis,  COPD, PAD S/P PCI, MIXED CMP S/P CRT-D 8/2020 CAD s/p stenting , pacemaker , LBBB, BIPOLAR ,  lung cancer, bladder cancer 2021 s/p excision no chemo and  AAA.  Pt had aortic duplex 3/22/22 and infrarenal aorta measured 4.2 cm at max diameter. Pt denies any severe cp , back pain or abdominal pain  Pt smokes 1 ppd , is on asa plavix and atorvastatin.  1/18/23  aorta duplex  5.4 x4.5 cm AAA distal aorta with layered thrombus , right iliac artery stent with endoleak , right common iliac artery aneurysm sac 4.2 cm x 3.6 cm. Patient is now s/p EVAR and is recovering well.

## 2023-02-17 NOTE — DISCHARGE NOTE NURSING/CASE MANAGEMENT/SOCIAL WORK - PATIENT PORTAL LINK FT
You can access the FollowMyHealth Patient Portal offered by [facility] by registering at the following website: [email or web address]. By joining [facility]’s FollowMyHealth portal, you will also be able to view your health information using other applications (apps) compatible with our system.

## 2023-02-17 NOTE — DISCHARGE NOTE PROVIDER - NSDCFUADDINST_GEN_ALL_CORE_FT
After discharge, if you have any signs of bleeding, increased incisional pain, swelling, redness, purulent drainage or fevers please return to the ED immediately.  Please follow up  in the office in 2 weeks  No heavy lifting for 2 weeks

## 2023-02-17 NOTE — DISCHARGE NOTE NURSING/CASE MANAGEMENT/SOCIAL WORK - NSDCPEFALRISK_GEN_ALL_CORE
For information on Fall & Injury Prevention, visit: [email or web address] OR  [email or web address] OR  https://www.cdc.gov/steadi/patient.html

## 2023-02-17 NOTE — DISCHARGE NOTE PROVIDER - NSDCCPCAREPLAN_GEN_ALL_CORE_FT
PRINCIPAL DISCHARGE DIAGNOSIS  Diagnosis: S/P AAA (abdominal aortic aneurysm) repair  Assessment and Plan of Treatment:

## 2023-02-17 NOTE — DISCHARGE NOTE PROVIDER - NSDCCPTREATMENT_GEN_ALL_CORE_FT
PRINCIPAL PROCEDURE  Procedure: Endovascular repair of abdominal aortic aneurysm with visceral vessel  Findings and Treatment:

## 2023-02-17 NOTE — DISCHARGE NOTE PROVIDER - CARE PROVIDER_API CALL
Giovanny Cheatham)  Surgery; Vascular Surgery  [address], [address]st [place] [address]  Phone: [phone]  Fax: [phone]  Follow Up Time: 2 weeks

## 2023-02-17 NOTE — DISCHARGE NOTE PROVIDER - NSDCMRMEDTOKEN_GEN_ALL_CORE_FT
aspirin 81 mg oral tablet: 1 tab(s) orally once a day  atorvastatin 20 mg oral tablet: 1 tab(s) orally once a day  carvedilol 25 mg oral tablet: 1 tab(s) orally 2 times a day  clonazePAM 2 mg oral tablet: 1 tab(s) orally once a day (at bedtime)  clopidogrel 75 mg oral tablet: 1 tab(s) orally once a day  Entresto 24 mg-26 mg oral tablet: 1 tab(s) orally 2 times a day  fluPHENAZine 2.5 mg oral tablet: 1 tab(s) orally once a day  pantoprazole 40 mg oral delayed release tablet: 1 tab(s) orally once a day

## 2023-02-17 NOTE — DISCHARGE NOTE PROVIDER - NSDCFUSCHEDAPPT_GEN_ALL_CORE_FT
[facility]  CARDIOLOGY [address]  Scheduled Appointment: 02/28/2023    Jose Benito  [facility]  CARDIOLOGY [address]  Scheduled Appointment: 02/28/2023

## 2023-02-23 PROBLEM — U07.1 COVID-19: Chronic | Status: ACTIVE | Noted: 2023-01-31

## 2023-02-28 ENCOUNTER — APPOINTMENT (OUTPATIENT)
Dept: CARDIOLOGY | Facility: CLINIC | Age: 73
End: 2023-02-28
Payer: MEDICARE

## 2023-02-28 ENCOUNTER — NON-APPOINTMENT (OUTPATIENT)
Age: 73
End: 2023-02-28

## 2023-02-28 VITALS
WEIGHT: 177 LBS | RESPIRATION RATE: 16 BRPM | HEART RATE: 86 BPM | HEIGHT: 69 IN | OXYGEN SATURATION: 95 % | DIASTOLIC BLOOD PRESSURE: 60 MMHG | BODY MASS INDEX: 26.22 KG/M2 | SYSTOLIC BLOOD PRESSURE: 84 MMHG

## 2023-02-28 DIAGNOSIS — Z79.899 OTHER LONG TERM (CURRENT) DRUG THERAPY: ICD-10-CM

## 2023-02-28 PROCEDURE — 99214 OFFICE O/P EST MOD 30 MIN: CPT

## 2023-02-28 PROCEDURE — 93289 INTERROG DEVICE EVAL HEART: CPT

## 2023-02-28 PROCEDURE — 93000 ELECTROCARDIOGRAM COMPLETE: CPT

## 2023-02-28 PROCEDURE — 93000 ELECTROCARDIOGRAM COMPLETE: CPT | Mod: 59

## 2023-02-28 RX ORDER — MIDODRINE HYDROCHLORIDE 5 MG/1
5 TABLET ORAL
Qty: 90 | Refills: 1 | Status: DISCONTINUED | COMMUNITY
Start: 2022-10-13 | End: 2023-02-28

## 2023-02-28 NOTE — ASSESSMENT
[FreeTextEntry1] : ECG: SR, atrial sensed, BiV paced\par \par BUN 27 Creat 1.6 (3/2021) \par Na 139 K 4.5 Cl 106 CO2 26 BUN 35 Creat 1.9 (1/2021)\par \par  HDL 34 LDL 61  (3/2021) \par BNP 1961 \par  HDL 30 LDL 98  (11/2019) \par \par ECHO 2/2023:\par 1. Normal LV size, low normal function. EF 50-55%\par 2. Mild RVE with normal function\par 3. Moderate OLLIE, normal LA\par 4. Mild MR and TR,m RVSP 35.8mmHg\par \par CRT-D check 2/28/2023: Normal Function, BiV paced 99%\par CRT-D Check 6/21/2022: NOrmal function, BiV paced 99% (not dependent) \par \par AORTIC DUPLEX 3/2022:\par 1. Large fusiform AAA, distally, 4.2 x 4.5cm\par \par EMILEE/PVR 4/2022:\par 1. R 0.55 L 0.78\par 2. PVR c/w SFA disease\par \par LE ARTERIAL DUPLEX 4/2022: \par 1. MOderate plaque, no obstruction\par \par PHARM NUCLEAR STRESS TEST 1/2022:\par 1. Prior mid-baasl inferior wall infarct, no ischemia\par 2. Low normal LV function, EF 54%\par 3. Rare PVCs\par 4. Infarct smaller compared to 2019 study\par \par ECHO 4/2022:\par 1. NOrmal LV size and function, EF 55%\par 2. Moderate RVE with normal function\par 3. Moderate OLLIE\par 4. Mild TR, RVSP 34mmHg\par \par ECHO 6/2021:\par 1. TDS/TLS, definity used \par 2. Low normal LV function, EF 50-55%\par 3. Grade I diastolic dysfunction\par 4. RVE with low normal function\par \par \par ECHO 11/2020:\par 1. Moderate LV systolic dysfunction, EF 35-40%\par 2. Grade I diastolic dysfunction\par 3. Low normal RV function\par 4. Normal LA/RA\par 5. Mild to moderate TR, RVSP 31mmHg\par \par ECHO 6/2020:\par 1. TDS\par 2. Severe LV dysfunction, EF 25-30%\par 3. Grade I diastolic dysfunction\par 4. Mild TR\par 5. Mild OLLIE\par \par EVENT MONITOR 5/2020:\par 1. 12 days monitored\par 2. SR\par 3. Frequent PVCs, 28% burden\par 4. Short runs NSVT\par \par LHC 2/2020:\par 1. LM normal\par 2. mLAD 80% stenosis, s/p MARÍA 3.5x15mm stent \par 3. LCX normal\par 4. pRCA 30% stenosis\par 5. mRCA 50% stenosis\par \par CT Chest 2018:  Scattered aortic calcifications and mural thrombus \par \par ECHO 1/2020:\par 1. Severe LV dysfunction, EF 20-25% with severe inferior/inferolateral hypo\par 2. Normal RV/LA/RA\par 3. Mild TR\par \par AORTIC SCAN\par 1. AAA with stent and mural thrombus\par \par NUCLEAR STRESS TEST 1/2020:\par 1.  INferior/inferoseptal infarct with EF 22%\par 2. No ischemia\par \par LE ARTERIAL DUPLEX\par 1. MOderate plaque\par 2. No hemodynamically significant stenosis\par \par EMILEE/PVR\par 1. R 0.86\par 2. L 0.82\par 3. PVR suggestive of inflow disease\par \par

## 2023-02-28 NOTE — DISCUSSION/SUMMARY
[FreeTextEntry1] : Patient is a 71 yo M with tobacco dependence, HLD, COPD, aortic atherosclerosis, AAA with mural thrombus,  PAD, CAD s/p PCI LAD 2/2020, LBBB, mixed CMP,  CRT-D fall 2020 here for cardiac follow up.  \par \par \par -Testing has showed severe CMP that is multifactorial, does have CAD and s/p PCI but no improvement in CMP. Maybe related to prior EtOH and substance abuse years ago, possibly genetic. No myocarditis. Also significant PVCs and improved with Amio therapy with better % CRT pacing from PVC suppression\par \par -PHARM nuclear stress test 1/2022 without ischemia, infarct inferiorly appears smaller and EF improved. Interestingly CAd was all LAD and not RCA. ? if scar/defect was all non-ischemic and has improved with meds/CRT and PVC ablation. Still with RVE \par \par -s/p PVC ablation 10/2021 and doing well, prior had been on amio\par \par -Echo 2/2023 with significant improvement in LV function, EF 50-55%. Continued right sided chamber enlargement. Mild MR not significant, surveillance only \par \par -Recurrent bladder cancer 6/2021 and s/p excision without need for chemo\par \par -Follows with Dr Crump for significant PAD. Has a 5.4cm infrarenal AAA with layered thrombus and right iliac artery stent with endocleak. Right common iliac artery aneurysm sac 4.2c x 3.6cm. HAd for aortogram with embolization of right hypogastric artery and  endovascular repair of AAA. Tolerated well\par \par -BP at home recently 110-120s systolic. Will be getting new cuff and monitor at home then bring in for check in office in both arms and calibrate with ours. IF persistently low at home will add back low dose midodrine, ? component autonomic dysfunction. EF improved so less concern on midodrine but prefer off if can tolerate\par \par 1. CV stable, continue med management of CMP\par 2. Surveillance of CAD and continue med management \par 3. DAPT and statin\par 4. Continue to monitor for PAF, if recurrence will need to re-assess A/C but none recently. \par 5. continue current doses carvedilol/entresto, no BP room to add MRA or farxiga at this time and EF better\par 6. Increase physical activity as tolerated \par 7. Monitor BP at home with new cuff, bring in for calibration and check in both arms. \par 8. Follow up 3 months, CRT check then as well \par  [EKG obtained to assist in diagnosis and management of assessed problem(s)] : EKG obtained to assist in diagnosis and management of assessed problem(s)

## 2023-02-28 NOTE — HISTORY OF PRESENT ILLNESS
[FreeTextEntry1] : Patient is a 74 yo M with tobacco dependence,  HLD, COPD, PAD s/p PCI, mixed CMP s/p CRT-D 8/2020 here for follow up.  On disability for lung cancer, remains in remission. Had chemotherapy years ago. Also had bladder cancer 2021 s/p excision without need for chemo. \par \par  Was noted to be having increasing STEVENSON, noted by his wife in fall 2019.. Had echo and stress testing, found to have severe CMP and coreg/entresto started. Underwent cath as well and  s/p LAD stent 2/2020. Underwent CRT-D subsequently due to no improvement in EF\par \par Patient  underwent catheter ablation of PVCs. Had initially been tried on Amio with improvement in PVC burden, better BiV pacing and improved EF. Stopped due to side effects and now s/p ablation with Dr. Peters. \par \par Was in [place] fall 2022 for COVID, BP lower than baseline and meds cut back. Was in [place] now home. \par \par \par States feels at baseline now. Strength good and no CP/SOB. Patient denies PND/orthopnea/edema/palpitations/syncope/claudication. Carvedilol lowerd last OV and goal to wean off midodrine. Cut to 5mg after last OV and then stopped after his recent surgery. \par \par Tolerated procedure for endovascular repair of AAA recently. 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

## 2023-03-15 ENCOUNTER — APPOINTMENT (OUTPATIENT)
Dept: VASCULAR SURGERY | Facility: CLINIC | Age: 73
End: 2023-03-15
Payer: MEDICARE

## 2023-03-15 VITALS
RESPIRATION RATE: 16 BRPM | OXYGEN SATURATION: 97 % | DIASTOLIC BLOOD PRESSURE: 71 MMHG | TEMPERATURE: 96.5 F | WEIGHT: 174 LBS | HEART RATE: 97 BPM | BODY MASS INDEX: 25.77 KG/M2 | SYSTOLIC BLOOD PRESSURE: 106 MMHG | HEIGHT: 69 IN

## 2023-03-15 PROCEDURE — 99024 POSTOP FOLLOW-UP VISIT: CPT

## 2023-03-15 NOTE — ASSESSMENT
[FreeTextEntry1] : 74 yo male s/p EVAR for 5.4 cm infrarenal AAA and right common iliac artery aneurysm s/p stenting.\par \par Pt counseled on surgical procedure performed and why it was necessary \par Pt counseled on smoking cessation \par Continue ASA, Plavix and atorvastatin \par RTC in 4 weeks for aorto iliac duplex \par \par \par A total of 20 minutes was spent with patient and coordinating care\par

## 2023-03-15 NOTE — HISTORY OF PRESENT ILLNESS
[FreeTextEntry1] : 9/7/22: 71 yo male PMHx arthritis, CAD s/p stenting, pacemaker, lung cancer, presents for evaluation of AAA. Pt had an aortic duplex 3/22/22 and the infrarenal aorta measured 4.2 cm at max diameter. Pt denies any severe chest pain, back pain or abdominal pain. Pt smokes 1 pack of cigarettes per day. Pt takes aspirin, Plavix and Atorvastatin. \par \par 10/26/22: Pt here today to review his CTA results. He is doing well since last visit. No new symptoms.  Pt continues to smokes 1 pack of cigarettes per day. Pt takes aspirin, Plavix and Atorvastatin. \par \par 1/18/23: Pt here today for AAA duplex to monitor for growth. He is doing well since last visit. No new symptoms.  Pt continues to smokes 1 pack of cigarettes per day. Pt takes aspirin, Plavix and Atorvastatin. \par \par 3/15/23: Pt doing well s/p EVAR. He denies any chest pain, back pain or abdominal pain. He is walking without any claudication. He has been compliant with aspirin, Plavix and Atorvastatin. \par \par PSHx: \par 2/16/23 EVAR

## 2023-03-22 ENCOUNTER — APPOINTMENT (OUTPATIENT)
Dept: CARDIOLOGY | Facility: CLINIC | Age: 73
End: 2023-03-22
Payer: MEDICARE

## 2023-03-22 PROCEDURE — ZZZZZ: CPT

## 2023-04-07 ENCOUNTER — RX RENEWAL (OUTPATIENT)
Age: 73
End: 2023-04-07

## 2023-04-12 ENCOUNTER — APPOINTMENT (OUTPATIENT)
Dept: VASCULAR SURGERY | Facility: CLINIC | Age: 73
End: 2023-04-12
Payer: MEDICARE

## 2023-04-12 VITALS
OXYGEN SATURATION: 97 % | WEIGHT: 172 LBS | HEART RATE: 94 BPM | HEIGHT: 69 IN | SYSTOLIC BLOOD PRESSURE: 97 MMHG | TEMPERATURE: 97.9 F | BODY MASS INDEX: 25.48 KG/M2 | RESPIRATION RATE: 16 BRPM | DIASTOLIC BLOOD PRESSURE: 65 MMHG

## 2023-04-12 PROCEDURE — 93979 VASCULAR STUDY: CPT

## 2023-04-12 PROCEDURE — 99024 POSTOP FOLLOW-UP VISIT: CPT

## 2023-04-12 NOTE — PROCEDURE
[FreeTextEntry1] : 3/22/22 aorta duplex: 4.2 AAA distal aorta with layered thrombus. right iliac artery stent \par \par CTA: 5.1 cm AAA \par \par 1/18/23 aorta duplex: 5.4 x 4.5 cm AAA distal aorta with layered thrombus. right iliac artery stent with endoleak. Right common iliac artery aneurysm sac 4.2 cm x 3.6 cm \par \par 4/12/23 aorta duplex: 5.3 x 4.6 cm AAA distal aorta, no endoleak

## 2023-04-12 NOTE — HISTORY OF PRESENT ILLNESS
[FreeTextEntry1] : 9/7/22: 71 yo male PMHx arthritis, CAD s/p stenting, pacemaker, lung cancer, presents for evaluation of AAA. Pt had an aortic duplex 3/22/22 and the infrarenal aorta measured 4.2 cm at max diameter. Pt denies any severe chest pain, back pain or abdominal pain. Pt smokes 1 pack of cigarettes per day. Pt takes aspirin, Plavix and Atorvastatin. \par \par 10/26/22: Pt here today to review his CTA results. He is doing well since last visit. No new symptoms.  Pt continues to smokes 1 pack of cigarettes per day. Pt takes aspirin, Plavix and Atorvastatin. \par \par 1/18/23: Pt here today for AAA duplex to monitor for growth. He is doing well since last visit. No new symptoms.  Pt continues to smokes 1 pack of cigarettes per day. Pt takes aspirin, Plavix and Atorvastatin. \par \par 3/15/23: Pt doing well s/p EVAR. He denies any chest pain, back pain or abdominal pain. He is walking without any claudication. He has been compliant with aspirin, Plavix and Atorvastatin. \par \par 4/12/23: Pt doing well s/p EVAR. He denies any chest pain, back pain or abdominal pain. He is walking without any claudication. He has been compliant with aspirin, Plavix and Atorvastatin. \par \par PSHx: \par 2/16/23 EVAR

## 2023-04-12 NOTE — ASSESSMENT
[FreeTextEntry1] : 74 yo male s/p EVAR for 5.4 cm infrarenal AAA and right common iliac artery aneurysm s/p stenting. Stent patent on duplex today without evidence of endoleak \par \par Pt counseled on results of duplex and above diagnosis.\par Pt counseled on smoking cessation \par Continue ASA, Plavix and atorvastatin \par RTC in 6 months for repeat aorto iliac duplex \par \par \par A total of 20 minutes was spent with patient and coordinating care\par

## 2023-05-02 ENCOUNTER — EMERGENCY (EMERGENCY)
Facility: HOSPITAL | Age: 73
LOS: 1 days | Discharge: HOME | End: 2023-05-02
Attending: EMERGENCY MEDICINE
Payer: MEDICARE

## 2023-05-02 VITALS
TEMPERATURE: 97 F | OXYGEN SATURATION: 97 % | RESPIRATION RATE: 18 BRPM | DIASTOLIC BLOOD PRESSURE: 58 MMHG | HEART RATE: 94 BPM | SYSTOLIC BLOOD PRESSURE: 87 MMHG

## 2023-05-02 VITALS
OXYGEN SATURATION: 95 % | TEMPERATURE: 98 F | DIASTOLIC BLOOD PRESSURE: 72 MMHG | SYSTOLIC BLOOD PRESSURE: 112 MMHG | HEART RATE: 80 BPM | RESPIRATION RATE: 18 BRPM

## 2023-05-02 DIAGNOSIS — Z98.890 OTHER SPECIFIED POSTPROCEDURAL STATES: Chronic | ICD-10-CM

## 2023-05-02 DIAGNOSIS — Z96.649 PRESENCE OF UNSPECIFIED ARTIFICIAL HIP JOINT: Chronic | ICD-10-CM

## 2023-05-02 DIAGNOSIS — Z95.5 PRESENCE OF CORONARY ANGIOPLASTY IMPLANT AND GRAFT: Chronic | ICD-10-CM

## 2023-05-02 DIAGNOSIS — Z90.89 ACQUIRED ABSENCE OF OTHER ORGANS: Chronic | ICD-10-CM

## 2023-05-02 DIAGNOSIS — Z95.810 PRESENCE OF AUTOMATIC (IMPLANTABLE) CARDIAC DEFIBRILLATOR: Chronic | ICD-10-CM

## 2023-05-02 DIAGNOSIS — I73.9 PERIPHERAL VASCULAR DISEASE, UNSPECIFIED: Chronic | ICD-10-CM

## 2023-05-02 LAB
ALBUMIN SERPL ELPH-MCNC: 3.8 G/DL — SIGNIFICANT CHANGE UP (ref 3.3–5.2)
ALP SERPL-CCNC: 141 U/L — HIGH (ref 40–120)
ALT FLD-CCNC: 7 U/L — SIGNIFICANT CHANGE UP
ANION GAP SERPL CALC-SCNC: 14 MMOL/L — SIGNIFICANT CHANGE UP (ref 5–17)
APTT BLD: 44.7 SEC — HIGH (ref 27.5–35.5)
AST SERPL-CCNC: 12 U/L — SIGNIFICANT CHANGE UP
BASE EXCESS BLDV CALC-SCNC: -2.4 MMOL/L — LOW (ref -2–3)
BASOPHILS # BLD AUTO: 0.06 K/UL — SIGNIFICANT CHANGE UP (ref 0–0.2)
BASOPHILS NFR BLD AUTO: 0.5 % — SIGNIFICANT CHANGE UP (ref 0–2)
BILIRUB SERPL-MCNC: 0.9 MG/DL — SIGNIFICANT CHANGE UP (ref 0.4–2)
BUN SERPL-MCNC: 25.5 MG/DL — HIGH (ref 8–20)
CA-I SERPL-SCNC: 1.15 MMOL/L — SIGNIFICANT CHANGE UP (ref 1.15–1.33)
CALCIUM SERPL-MCNC: 9.2 MG/DL — SIGNIFICANT CHANGE UP (ref 8.4–10.5)
CHLORIDE BLDV-SCNC: 108 MMOL/L — SIGNIFICANT CHANGE UP (ref 96–108)
CHLORIDE SERPL-SCNC: 103 MMOL/L — SIGNIFICANT CHANGE UP (ref 96–108)
CO2 SERPL-SCNC: 21 MMOL/L — LOW (ref 22–29)
CREAT SERPL-MCNC: 1.54 MG/DL — HIGH (ref 0.5–1.3)
EGFR: 47 ML/MIN/1.73M2 — LOW
EOSINOPHIL # BLD AUTO: 0.35 K/UL — SIGNIFICANT CHANGE UP (ref 0–0.5)
EOSINOPHIL NFR BLD AUTO: 3.2 % — SIGNIFICANT CHANGE UP (ref 0–6)
FLUAV AG NPH QL: SIGNIFICANT CHANGE UP
FLUBV AG NPH QL: SIGNIFICANT CHANGE UP
GAS PNL BLDV: 134 MMOL/L — LOW (ref 136–145)
GAS PNL BLDV: SIGNIFICANT CHANGE UP
GAS PNL BLDV: SIGNIFICANT CHANGE UP
GLUCOSE BLDV-MCNC: 171 MG/DL — HIGH (ref 70–99)
GLUCOSE SERPL-MCNC: 162 MG/DL — HIGH (ref 70–99)
HCO3 BLDV-SCNC: 23 MMOL/L — SIGNIFICANT CHANGE UP (ref 22–29)
HCT VFR BLD CALC: 38.6 % — LOW (ref 39–50)
HCT VFR BLDA CALC: 38 % — SIGNIFICANT CHANGE UP
HGB BLD CALC-MCNC: 12.7 G/DL — SIGNIFICANT CHANGE UP (ref 12.6–17.4)
HGB BLD-MCNC: 12.7 G/DL — LOW (ref 13–17)
IMM GRANULOCYTES NFR BLD AUTO: 1.6 % — HIGH (ref 0–0.9)
INR BLD: 1.26 RATIO — HIGH (ref 0.88–1.16)
LACTATE BLDV-MCNC: 1.3 MMOL/L — SIGNIFICANT CHANGE UP (ref 0.5–2)
LIDOCAIN IGE QN: 22 U/L — SIGNIFICANT CHANGE UP (ref 22–51)
LYMPHOCYTES # BLD AUTO: 1.67 K/UL — SIGNIFICANT CHANGE UP (ref 1–3.3)
LYMPHOCYTES # BLD AUTO: 15.3 % — SIGNIFICANT CHANGE UP (ref 13–44)
MAGNESIUM SERPL-MCNC: 2 MG/DL — SIGNIFICANT CHANGE UP (ref 1.8–2.6)
MCHC RBC-ENTMCNC: 28.6 PG — SIGNIFICANT CHANGE UP (ref 27–34)
MCHC RBC-ENTMCNC: 32.9 GM/DL — SIGNIFICANT CHANGE UP (ref 32–36)
MCV RBC AUTO: 86.9 FL — SIGNIFICANT CHANGE UP (ref 80–100)
MONOCYTES # BLD AUTO: 0.88 K/UL — SIGNIFICANT CHANGE UP (ref 0–0.9)
MONOCYTES NFR BLD AUTO: 8 % — SIGNIFICANT CHANGE UP (ref 2–14)
NEUTROPHILS # BLD AUTO: 7.8 K/UL — HIGH (ref 1.8–7.4)
NEUTROPHILS NFR BLD AUTO: 71.4 % — SIGNIFICANT CHANGE UP (ref 43–77)
NT-PROBNP SERPL-SCNC: 498 PG/ML — HIGH (ref 0–300)
PCO2 BLDV: 42 MMHG — SIGNIFICANT CHANGE UP (ref 42–55)
PH BLDV: 7.35 — SIGNIFICANT CHANGE UP (ref 7.32–7.43)
PLATELET # BLD AUTO: 156 K/UL — SIGNIFICANT CHANGE UP (ref 150–400)
PO2 BLDV: 42 MMHG — SIGNIFICANT CHANGE UP (ref 25–45)
POTASSIUM BLDV-SCNC: 4.2 MMOL/L — SIGNIFICANT CHANGE UP (ref 3.5–5.1)
POTASSIUM SERPL-MCNC: 4.3 MMOL/L — SIGNIFICANT CHANGE UP (ref 3.5–5.3)
POTASSIUM SERPL-SCNC: 4.3 MMOL/L — SIGNIFICANT CHANGE UP (ref 3.5–5.3)
PROT SERPL-MCNC: 7.8 G/DL — SIGNIFICANT CHANGE UP (ref 6.6–8.7)
PROTHROM AB SERPL-ACNC: 14.6 SEC — HIGH (ref 10.5–13.4)
RBC # BLD: 4.44 M/UL — SIGNIFICANT CHANGE UP (ref 4.2–5.8)
RBC # FLD: 13.2 % — SIGNIFICANT CHANGE UP (ref 10.3–14.5)
RSV RNA NPH QL NAA+NON-PROBE: SIGNIFICANT CHANGE UP
SAO2 % BLDV: 69.3 % — SIGNIFICANT CHANGE UP
SARS-COV-2 RNA SPEC QL NAA+PROBE: SIGNIFICANT CHANGE UP
SODIUM SERPL-SCNC: 138 MMOL/L — SIGNIFICANT CHANGE UP (ref 135–145)
TROPONIN T SERPL-MCNC: <0.01 NG/ML — SIGNIFICANT CHANGE UP (ref 0–0.06)
TROPONIN T SERPL-MCNC: <0.01 NG/ML — SIGNIFICANT CHANGE UP (ref 0–0.06)
WBC # BLD: 10.94 K/UL — HIGH (ref 3.8–10.5)
WBC # FLD AUTO: 10.94 K/UL — HIGH (ref 3.8–10.5)

## 2023-05-02 PROCEDURE — 82330 ASSAY OF CALCIUM: CPT

## 2023-05-02 PROCEDURE — 85730 THROMBOPLASTIN TIME PARTIAL: CPT

## 2023-05-02 PROCEDURE — 80053 COMPREHEN METABOLIC PANEL: CPT

## 2023-05-02 PROCEDURE — 82962 GLUCOSE BLOOD TEST: CPT

## 2023-05-02 PROCEDURE — 99284 EMERGENCY DEPT VISIT MOD MDM: CPT

## 2023-05-02 PROCEDURE — 83880 ASSAY OF NATRIURETIC PEPTIDE: CPT

## 2023-05-02 PROCEDURE — 82947 ASSAY GLUCOSE BLOOD QUANT: CPT

## 2023-05-02 PROCEDURE — 85014 HEMATOCRIT: CPT

## 2023-05-02 PROCEDURE — 71045 X-RAY EXAM CHEST 1 VIEW: CPT | Mod: 26

## 2023-05-02 PROCEDURE — 99285 EMERGENCY DEPT VISIT HI MDM: CPT

## 2023-05-02 PROCEDURE — 85610 PROTHROMBIN TIME: CPT

## 2023-05-02 PROCEDURE — 83605 ASSAY OF LACTIC ACID: CPT

## 2023-05-02 PROCEDURE — 36415 COLL VENOUS BLD VENIPUNCTURE: CPT

## 2023-05-02 PROCEDURE — 83690 ASSAY OF LIPASE: CPT

## 2023-05-02 PROCEDURE — 71045 X-RAY EXAM CHEST 1 VIEW: CPT

## 2023-05-02 PROCEDURE — 85018 HEMOGLOBIN: CPT

## 2023-05-02 PROCEDURE — 82803 BLOOD GASES ANY COMBINATION: CPT

## 2023-05-02 PROCEDURE — 87637 SARSCOV2&INF A&B&RSV AMP PRB: CPT

## 2023-05-02 PROCEDURE — 96360 HYDRATION IV INFUSION INIT: CPT

## 2023-05-02 PROCEDURE — 93005 ELECTROCARDIOGRAM TRACING: CPT

## 2023-05-02 PROCEDURE — 85025 COMPLETE CBC W/AUTO DIFF WBC: CPT

## 2023-05-02 PROCEDURE — 99285 EMERGENCY DEPT VISIT HI MDM: CPT | Mod: 25

## 2023-05-02 PROCEDURE — 84295 ASSAY OF SERUM SODIUM: CPT

## 2023-05-02 PROCEDURE — 83735 ASSAY OF MAGNESIUM: CPT

## 2023-05-02 PROCEDURE — 82435 ASSAY OF BLOOD CHLORIDE: CPT

## 2023-05-02 PROCEDURE — 84132 ASSAY OF SERUM POTASSIUM: CPT

## 2023-05-02 PROCEDURE — 84484 ASSAY OF TROPONIN QUANT: CPT

## 2023-05-02 PROCEDURE — 93010 ELECTROCARDIOGRAM REPORT: CPT | Mod: 76

## 2023-05-02 RX ORDER — ASPIRIN/CALCIUM CARB/MAGNESIUM 324 MG
324 TABLET ORAL ONCE
Refills: 0 | Status: COMPLETED | OUTPATIENT
Start: 2023-05-02 | End: 2023-05-02

## 2023-05-02 RX ORDER — SODIUM CHLORIDE 9 MG/ML
500 INJECTION INTRAMUSCULAR; INTRAVENOUS; SUBCUTANEOUS ONCE
Refills: 0 | Status: COMPLETED | OUTPATIENT
Start: 2023-05-02 | End: 2023-05-02

## 2023-05-02 RX ADMIN — Medication 324 MILLIGRAM(S): at 07:18

## 2023-05-02 RX ADMIN — SODIUM CHLORIDE 500 MILLILITER(S): 9 INJECTION INTRAMUSCULAR; INTRAVENOUS; SUBCUTANEOUS at 07:00

## 2023-05-02 NOTE — CONSULT NOTE ADULT - SUBJECTIVE AND OBJECTIVE BOX
CHIEF COMPLAINT: chest pain     HPI: Patient is a 73 yo M with tobacco dependence, HLD, COPD, aortic atherosclerosis, AAA with mural thrombus s/p EVAR, PAD, CAD s/p PCI LAD 2/2020, LBBB, mixed CMP, CRT-D fall 2020 with signifciant improvement in EF to 50-55%, PVCs here with chest pain.     PAST MEDICAL & SURGICAL HISTORY:  Lung cancer  in remission s/p chemo and XRT 2018      Bipolar 1 disorder      Bladder cancer  2009 s/p BCG; reoccurence 2021      Cataract      PVD (peripheral vascular disease)  stents X2      COPD (chronic obstructive pulmonary disease)      History of syphilis      History of ETOH abuse  sober x 20 years      AAA (abdominal aortic aneurysm)  with mural thrombus      HFrEF (heart failure with reduced ejection fraction)  EF 25-30% (echo 6/2020); EF 50-55% )ecgo 6/2021)      CAD (coronary artery disease)  s/p mLAD SHANTHI      Frequent PVCs      HLD (hyperlipidemia)      H/O cardiomyopathy  mixed cardiomyopahty s/p MDT  CRT-D      Left bundle branch block (LBBB)      2019 novel coronavirus disease (COVID-19)      PVD (peripheral vascular disease)  angiogram stents X2      S/P LASIK surgery      History of tonsillectomy      History of bladder surgery      Stented coronary artery      Cardiac resynchronization therapy defibrillator (CRT-D) in place      Status post THR (total hip replacement)  right      HOME MEDS:  Current Meds   · Aspirin 81 MG Oral Tablet Delayed Release; TAKE 1 TABLET DAILY AS DIRECTED   · Atorvastatin Calcium 20 MG Oral Tablet; Take 1 tablet by mouth at  bedtime   · Carvedilol 3.125 MG Oral Tablet; 1 tablet twice daily   · Clopidogrel Bisulfate 75 MG Oral Tablet; TAKE 1 TABLET BY MOUTH  DAILY   · Entresto 24-26 MG Oral Tablet; TAKE 1 TABLET BY MOUTH TWICE A DAY   · KlonoPIN 1 MG Oral Tablet; Take 1 tablet daily   · Pantoprazole Sodium 40 MG Oral Tablet Delayed Release; TAKE 1 TABLET BY MOUTH  EVERY DAY   · Sildenafil Citrate 100 MG Oral Tablet; TAKE ONE TABLET BY MOUTH EVERY DAY AS  NEEDED        FAMILY HISTORY:  FAMILY HISTORY:  Family history of other heart disease (Sibling)  cardiomyopathy    Family history of lung cancer    Family history of diabetes mellitus (Sibling)        SOCIAL HISTORY: no EtOH, former substance use, positive tobacco    ROS: GI negative, All others negative    PHYSCIAL EXAM:  Vital Signs Last 24 Hrs  T(C): 36.7 (02 May 2023 09:00), Max: 37.1 (02 May 2023 07:31)  T(F): 98.1 (02 May 2023 09:00), Max: 98.8 (02 May 2023 07:31)  HR: 82 (02 May 2023 09:00) (82 - 94)  BP: 107/72 (02 May 2023 09:00) (87/58 - 117/75)  BP(mean): --  RR: 18 (02 May 2023 09:00) (18 - 18)  SpO2: 97% (02 May 2023 09:00) (96% - 98%)    Parameters below as of 02 May 2023 09:00  Patient On (Oxygen Delivery Method): room air      I&O's Summary    GEN: NAD  HEENT: MMM, sclera anicteric  RESP: CTA bilaterally  CVS: S1S2, RRR, no JVD, no M/R/G  GI: Soft, NT, ND, BS+  EXT: no C/C/E  NEURO: AAOX3, slowed mentation  PSYCH: Normal affect        LABS:                        12.7   10.94 )-----------( 156      ( 02 May 2023 06:55 )             38.6     05-02    138  |  103  |  25.5<H>  ----------------------------<  162<H>  4.3   |  21.0<L>  |  1.54<H>    Ca    9.2      02 May 2023 06:55  Mg     2.0     05-02    TPro  7.8  /  Alb  3.8  /  TBili  0.9  /  DBili  x   /  AST  12  /  ALT  7   /  AlkPhos  141<H>  05-02    CARDIAC MARKERS ( 02 May 2023 06:55 )  x     / <0.01 ng/mL / x     / x     / x          PT/INR - ( 02 May 2023 06:55 )   PT: 14.6 sec;   INR: 1.26 ratio         PTT - ( 02 May 2023 06:55 )  PTT:44.7 sec      ECHO 2/2023:  1. Normal LV size, low normal function. EF 50-55%  2. Mild RVE with normal function  3. Moderate OLLIE, normal LA  4. Mild MR and TR,m RVSP 35.8mmHg    CRT-D check 2/28/2023: Normal Function, BiV paced 99%      EMILEE/PVR 4/2022:  1. R 0.55 L 0.78  2. PVR c/w SFA disease    LE ARTERIAL DUPLEX 4/2022:   1. MOderate plaque, no obstruction    PHARM NUCLEAR STRESS TEST 1/2022:  1. Prior mid-baasl inferior wall infarct, no ischemia  2. Low normal LV function, EF 54%  3. Rare PVCs  4. Infarct smaller compared to 2019 study          ECG: SR, atrial sensed and BiV paced    Assessment:    Patient is a 73 yo M with tobacco dependence, HLD, COPD, aortic atherosclerosis, AAA with mural thrombus s/p EVAR, PAD, CAD s/p PCI LAD 2/2020, LBBB, mixed CMP, CRT-D fall 2020 with signifciant improvement in EF to 50-55%, PVCs here with chest pain.   -ECG with known  CRT pacing  -Trops negative, last nuclear stress 1/2022 without ischemia  -Pain atypical     Plan: (TO BE SEEN)   1.       Jose Benito MD CHIEF COMPLAINT: chest pain     HPI: Patient is a 73 yo M with tobacco dependence, HLD, COPD, aortic atherosclerosis, AAA with mural thrombus s/p EVAR, PAD, CAD s/p PCI LAD 2/2020, LBBB, mixed CMP, CRT-D fall 2020 with signifciant improvement in EF to 50-55%, PVCs here with chest pain.   Noting cough and nasal congestion past few days, worse at night. No fevers however. No exertional CP/SOB. Woke up last night with severe left sided chest pain, worse with breathing. Here in ED pain much better, only feels when takes deep breath. No PND/orthopnea/palps/syncope.     PAST MEDICAL & SURGICAL HISTORY:  Lung cancer  in remission s/p chemo and XRT 2018      Bipolar 1 disorder      Bladder cancer  2009 s/p BCG; reoccurence 2021      Cataract      PVD (peripheral vascular disease)  stents X2      COPD (chronic obstructive pulmonary disease)      History of syphilis      History of ETOH abuse  sober x 20 years      AAA (abdominal aortic aneurysm)  with mural thrombus      HFrEF (heart failure with reduced ejection fraction)  EF 25-30% (echo 6/2020); EF 50-55% )ecgo 6/2021)      CAD (coronary artery disease)  s/p mLAD SHANTHI      Frequent PVCs      HLD (hyperlipidemia)      H/O cardiomyopathy  mixed cardiomyopahty s/p MDT  CRT-D      Left bundle branch block (LBBB)      2019 novel coronavirus disease (COVID-19)      PVD (peripheral vascular disease)  angiogram stents X2      S/P LASIK surgery      History of tonsillectomy      History of bladder surgery      Stented coronary artery      Cardiac resynchronization therapy defibrillator (CRT-D) in place      Status post THR (total hip replacement)  right      HOME MEDS:  Current Meds   · Aspirin 81 MG Oral Tablet Delayed Release; TAKE 1 TABLET DAILY AS DIRECTED   · Atorvastatin Calcium 20 MG Oral Tablet; Take 1 tablet by mouth at  bedtime   · Carvedilol 3.125 MG Oral Tablet; 1 tablet twice daily   · Clopidogrel Bisulfate 75 MG Oral Tablet; TAKE 1 TABLET BY MOUTH  DAILY   · Entresto 24-26 MG Oral Tablet; TAKE 1 TABLET BY MOUTH TWICE A DAY   · KlonoPIN 1 MG Oral Tablet; Take 1 tablet daily   · Pantoprazole Sodium 40 MG Oral Tablet Delayed Release; TAKE 1 TABLET BY MOUTH  EVERY DAY   · Sildenafil Citrate 100 MG Oral Tablet; TAKE ONE TABLET BY MOUTH EVERY DAY AS  NEEDED        FAMILY HISTORY:  FAMILY HISTORY:  Family history of other heart disease (Sibling)  cardiomyopathy    Family history of lung cancer    Family history of diabetes mellitus (Sibling)        SOCIAL HISTORY: no EtOH, former substance use, positive tobacco    ROS: GI negative, All others negative    PHYSCIAL EXAM:  Vital Signs Last 24 Hrs  T(C): 36.7 (02 May 2023 09:00), Max: 37.1 (02 May 2023 07:31)  T(F): 98.1 (02 May 2023 09:00), Max: 98.8 (02 May 2023 07:31)  HR: 82 (02 May 2023 09:00) (82 - 94)  BP: 107/72 (02 May 2023 09:00) (87/58 - 117/75)  BP(mean): --  RR: 18 (02 May 2023 09:00) (18 - 18)  SpO2: 97% (02 May 2023 09:00) (96% - 98%)    Parameters below as of 02 May 2023 09:00  Patient On (Oxygen Delivery Method): room air      I&O's Summary    GEN: NAD  HEENT: MMM, sclera anicteric  RESP: CTA bilaterally  CVS: S1S2, RRR, no JVD, no M/R/G  GI: Soft, NT, ND, BS+  EXT: no C/C/E  NEURO: AAOX3, slowed mentation  PSYCH: Normal affect        LABS:                        12.7   10.94 )-----------( 156      ( 02 May 2023 06:55 )             38.6     05-02    138  |  103  |  25.5<H>  ----------------------------<  162<H>  4.3   |  21.0<L>  |  1.54<H>    Ca    9.2      02 May 2023 06:55  Mg     2.0     05-02    TPro  7.8  /  Alb  3.8  /  TBili  0.9  /  DBili  x   /  AST  12  /  ALT  7   /  AlkPhos  141<H>  05-02    CARDIAC MARKERS ( 02 May 2023 06:55 )  x     / <0.01 ng/mL / x     / x     / x          PT/INR - ( 02 May 2023 06:55 )   PT: 14.6 sec;   INR: 1.26 ratio         PTT - ( 02 May 2023 06:55 )  PTT:44.7 sec      ECHO 2/2023:  1. Normal LV size, low normal function. EF 50-55%  2. Mild RVE with normal function  3. Moderate OLLIE, normal LA  4. Mild MR and TR,m RVSP 35.8mmHg    CRT-D check 2/28/2023: Normal Function, BiV paced 99%      EMILEE/PVR 4/2022:  1. R 0.55 L 0.78  2. PVR c/w SFA disease    LE ARTERIAL DUPLEX 4/2022:   1. MOderate plaque, no obstruction    PHARM NUCLEAR STRESS TEST 1/2022:  1. Prior mid-baasl inferior wall infarct, no ischemia  2. Low normal LV function, EF 54%  3. Rare PVCs  4. Infarct smaller compared to 2019 study          ECG: SR, atrial sensed and BiV paced    Assessment:    Patient is a 73 yo M with tobacco dependence, HLD, COPD, aortic atherosclerosis, AAA with mural thrombus s/p EVAR, PAD, CAD s/p PCI LAD 2/2020, LBBB, mixed CMP, CRT-D fall 2020 with signifciant improvement in EF to 50-55%, PVCs here with chest pain.   -ECG with known  CRT pacing  -Trops negative, last nuclear stress 1/2022 without ischemia  -Pain pleuritic and due to URI and recent coughing, non-cardiac     Plan:   1. CV stable at this time, no further inpatient work up  2. continue current home CV medications  3. Symptomatic support for URI  4. Outpatient follow up  5. thankS!       Jose Benito MD

## 2023-05-02 NOTE — ED PROVIDER NOTE - CLINICAL SUMMARY MEDICAL DECISION MAKING FREE TEXT BOX
Pt with CP, cough, initial hypotensive reading but improved on repeat without intervention, likely error/wrong cuff size. patient given 500cc of fluid. ASA. trop/ekg x2 negative seen by Thong agree not cardiac in nature. mild leukocytosis with shift no obvious infilrtate on CXR. RVP negative. will tx as clinical PNA start doxycycline. outpt Follow up

## 2023-05-02 NOTE — ED PROVIDER NOTE - PHYSICAL EXAMINATION
Gen: NAD, AOx3  Head: NCAT  HEENT: EOMI, oral mucosa moist, normal conjunctiva, neck supple  Lung: faint crackles at bases Lt > Rt, no respiratory distress  CV: rrr, no murmur, Normal perfusion  Abd: soft, NTND  MSK: No edema, no visible deformities  Neuro: No focal neurologic deficits  Skin: No rash   Psych: normal affect

## 2023-05-02 NOTE — ED ADULT TRIAGE NOTE - CHIEF COMPLAINT QUOTE
Ambulatory reporting L sided chest pain that started last night, PMH of AICD/pacemaker implant, denies defibrillator firing, N/V/D. Reports exertional dyspnea. EKG completed prior to triage.

## 2023-05-02 NOTE — ED PROVIDER NOTE - PATIENT PORTAL LINK FT
You can access the FollowMyHealth Patient Portal offered by [facility] by registering at the following website: [email or web address]. By joining [employer]’s FollowMyHealth portal, you will also be able to view your health information using other applications (apps) compatible with our system.

## 2023-05-02 NOTE — ED ADULT NURSE REASSESSMENT NOTE - NS ED NURSE REASSESS COMMENT FT1
Cardiology at bedside for pt evaluation/consult
Patient received from critical care. Assumed care of patient at this time.  A&Ox3. Patient reports having sharp left sided chest pain since 4 am, rates the pain a two out of ten at the moment. States the pain increases with taking a deep breath. No JVD. Denies radiation of pain. Denies SOB, denies falls. Reports having few stents placed a few months ago here. Patient states he "smokes five packs of cigarettes a day". Has a pacemaker. Waiting lab results.

## 2023-05-02 NOTE — ED PROVIDER NOTE - OBJECTIVE STATEMENT
73 yr old male presents with history of arthritis, COPD, PAD, CAD s/p stenting , PPM/AICD, LBBB, BIPOLAR, lung cancer, bladder cancer 2021 s/p excision no chemo and AAA EVAR 2/16/23. 73 yr old male presents with history of arthritis, COPD, PAD, CAD s/p stenting , PPM/AICD, LBBB, BIPOLAR, lung cancer, bladder cancer 2021 s/p excision no chemo and AAA EVAR 2/16/23, presenting with CP since last night constrant, nonradiating, unable to elaborate more maybe sharp. +cough. did not eat dinner last night, just feeling tired. no leg swelling. no nausea/vomiting/SOB/dizziness. no bleeding. no fever.

## 2023-05-02 NOTE — ED ADULT NURSE NOTE - OBJECTIVE STATEMENT
Pt report c/o of left side chest pain that started last night. Report the pain hasn't gone away. Pt with pacemaker/defib reports it did not fire. c/o of coughing. Denies any fever, dizziness nausea,. Pt is currently a smoker. Pt was found to be hypotensive in triage pt sent to CC. Pt assessed by MD. AV paced on monitor.  vitals stable, blood work and meds given.

## 2023-05-02 NOTE — ED PROVIDER NOTE - NSFOLLOWUPINSTRUCTIONS_ED_ALL_ED_FT
1. Return to ED for worsening, progressive or any other concerning symptoms   2. Follow up with your primary care doctor in 2-3days   3. Take Tylenol up to 1000 mg every 6 hours as needed for pain.   4. Take 100mg of Doxycycline twice a day for 7 days. Avoid sun exposure.     Pneumonia    Pneumonia is an infection of the lungs. Pneumonia may be caused by bacteria, viruses, or funguses. Symptoms include coughing, fever, chest pain when breathing deeply or coughing, shortness of breath, fatigue, or muscle aches. Pneumonia can be diagnosed with a medical history and physical exam, as well as other tests which may include a chest X-ray. If you were prescribed an antibiotic medicine, take it as told by your health care provider and do not stop taking the antibiotic even if you start to feel better. Do not use tobacco products, including cigarettes, chewing tobacco, and e-cigarettes.    SEEK IMMEDIATE MEDICAL CARE IF YOU HAVE ANY OF THE FOLLOWING SYMPTOMS: worsening shortness of breath, worsening chest pain, coughing up blood, change in mental status, lightheadedness/dizziness.

## 2023-05-26 ENCOUNTER — RX RENEWAL (OUTPATIENT)
Age: 73
End: 2023-05-26

## 2023-05-30 NOTE — HISTORY OF PRESENT ILLNESS
[FreeTextEntry1] : RUSS BUCHANAN is a 70 year old male with hyperlipidemia, COPD, PAD s/p stenting, tobacco use, lung cancer (in remission; s/p chemo + XRT in 2018), coronary artery disease (s/p mLAD SHANTHI), LBBB and mixed cardiomyopathy with NYHA IIC HFrEF (LVEF: 25-30%) who is referred by Dr. Benito in consultation for consideration of primary prevention ICD implantation.\par \par To summarize his history, he was first evaluated by Dr. Benito in 11/2019 after being found to have a LBBB on his ECG. At the time his wife noted that he had dyspnea when walking up stairs. He underwent TTE and nuclear stress testing which demonstrated depressed LVEF of 20-25%. He underwent cardiac catheterization which revealed 80% mLAD disease which was treated with a SHANTHI. He was initiated on GDMT and despite over 3 months of GDMT, his LVEF has remained low at 25-30%.\par \par Today, he states that he feels fine. He reports that he is not active at all but that is because he reports that there is "nothing to do." By his account, he feels well without any dyspnea on exertion, fatigue, chest pain, palpitations, fevers, chills, cough, sweats, orthopnea, paroxysmal nocturnal dyspnea, peripheral edema, lightheadedness, dizziness, syncope, nausea, vomiting, melena, hematochezia, or hematemesis.\par \par His wife however reports that he does snore and stops breathing at times. She also feels that he has had significant fatigue and gets dyspneic after going up a flight of stairs.
temporal

## 2023-06-08 NOTE — ASSESSMENT
Dc sent for review, cm to wait for report information.   [FreeTextEntry1] : ECG: SR, LBBB , PVC s\par \par  HDL 30 LDL 98  (11/2019) \par \par ECHO 6/2020:\par 1. TDS\par 2. Severe LV dysfunction, EF 25-30%\par 3. Grade I diastolic dysfunction\par 4. Mild TR\par 5. Mild OLLIE\par \par EVENT MONITOR 5/2020:\par 1. 12 days monitored\par 2. SR\par 3. Frequent PVCs, 28% burden\par 4. Short runs NSVT\par \par LHC 2/2020:\par 1. LM normal\par 2. mLAD 80% stenosis, s/p MARÍA 3.5x15mm stent \par 3. LCX normal\par 4. pRCA 30% stenosis\par 5. mRCA 50% stenosis\par \par CT Chest 2018:  Scattered aortic calcifications and mural thrombus \par \par ECHO 1/2020:\par 1. Severe LV dysfunction, EF 20-25% with severe inferior/inferolateral hypo\par 2. Normal RV/LA/RA\par 3. Mild TR\par \par AORTIC SCAN\par 1. AAA with stent and mural thrombus\par \par NUCLEAR STRESS TEST 1/2020:\par 1.  INferior/inferoseptal infarct with EF 22%\par 2. No ischemia\par \par LE ARTERIAL DUPLEX\par 1. MOderate plaque\par 2. No hemodynamically significant stenosis\par \par EMILEE/PVR\par 1. R 0.86\par 2. L 0.82\par 3. PVR suggestive of inflow disease\par \par

## 2023-07-03 ENCOUNTER — APPOINTMENT (OUTPATIENT)
Dept: CARDIOLOGY | Facility: CLINIC | Age: 73
End: 2023-07-03
Payer: MEDICARE

## 2023-07-03 VITALS
HEIGHT: 69 IN | SYSTOLIC BLOOD PRESSURE: 98 MMHG | OXYGEN SATURATION: 99 % | RESPIRATION RATE: 16 BRPM | HEART RATE: 79 BPM | DIASTOLIC BLOOD PRESSURE: 68 MMHG

## 2023-07-03 DIAGNOSIS — I44.7 LEFT BUNDLE-BRANCH BLOCK, UNSPECIFIED: ICD-10-CM

## 2023-07-03 PROCEDURE — 93000 ELECTROCARDIOGRAM COMPLETE: CPT | Mod: 59

## 2023-07-03 PROCEDURE — 93289 INTERROG DEVICE EVAL HEART: CPT

## 2023-07-03 PROCEDURE — 99214 OFFICE O/P EST MOD 30 MIN: CPT

## 2023-07-03 RX ORDER — FINASTERIDE 5 MG/1
5 TABLET, FILM COATED ORAL
Qty: 90 | Refills: 0 | Status: ACTIVE | COMMUNITY
Start: 2023-03-08

## 2023-07-03 NOTE — ASSESSMENT
[FreeTextEntry1] : ECG: SR, atrial sensed, BiV paced\par \par BUN 27 Creat 1.6 (3/2021) \par Na 139 K 4.5 Cl 106 CO2 26 BUN 35 Creat 1.9 (1/2021)\par \par  HDL 34 LDL 61  (3/2021) \par BNP 1961 \par  HDL 30 LDL 98  (11/2019) \par \par ECHO 2/2023:\par 1. Normal LV size, low normal function. EF 50-55%\par 2. Mild RVE with normal function\par 3. Moderate OLLIE, normal LA\par 4. Mild MR and TR,m RVSP 35.8mmHg\par \par CRT-D check 7/3/2023: Normal function, 2 briefn runsw  NSVT, BiV paced 98%, 18 months remaining \par CRT-D check 2/28/2023: Normal Function, BiV paced 99%\par CRT-D Check 6/21/2022: NOrmal function, BiV paced 99% (not dependent) \par \par AORTIC DUPLEX 3/2022:\par 1. Large fusiform AAA, distally, 4.2 x 4.5cm\par \par EMILEE/PVR 4/2022:\par 1. R 0.55 L 0.78\par 2. PVR c/w SFA disease\par \par LE ARTERIAL DUPLEX 4/2022: \par 1. MOderate plaque, no obstruction\par \par PHARM NUCLEAR STRESS TEST 1/2022:\par 1. Prior mid-baasl inferior wall infarct, no ischemia\par 2. Low normal LV function, EF 54%\par 3. Rare PVCs\par 4. Infarct smaller compared to 2019 study\par \par ECHO 4/2022:\par 1. NOrmal LV size and function, EF 55%\par 2. Moderate RVE with normal function\par 3. Moderate OLLIE\par 4. Mild TR, RVSP 34mmHg\par \par ECHO 6/2021:\par 1. TDS/TLS, definity used \par 2. Low normal LV function, EF 50-55%\par 3. Grade I diastolic dysfunction\par 4. RVE with low normal function\par \par \par ECHO 11/2020:\par 1. Moderate LV systolic dysfunction, EF 35-40%\par 2. Grade I diastolic dysfunction\par 3. Low normal RV function\par 4. Normal LA/RA\par 5. Mild to moderate TR, RVSP 31mmHg\par \par ECHO 6/2020:\par 1. TDS\par 2. Severe LV dysfunction, EF 25-30%\par 3. Grade I diastolic dysfunction\par 4. Mild TR\par 5. Mild OLLIE\par \par EVENT MONITOR 5/2020:\par 1. 12 days monitored\par 2. SR\par 3. Frequent PVCs, 28% burden\par 4. Short runs NSVT\par \par LHC 2/2020:\par 1. LM normal\par 2. mLAD 80% stenosis, s/p MARÍA 3.5x15mm stent \par 3. LCX normal\par 4. pRCA 30% stenosis\par 5. mRCA 50% stenosis\par \par CT Chest 2018:  Scattered aortic calcifications and mural thrombus \par \par ECHO 1/2020:\par 1. Severe LV dysfunction, EF 20-25% with severe inferior/inferolateral hypo\par 2. Normal RV/LA/RA\par 3. Mild TR\par \par AORTIC SCAN\par 1. AAA with stent and mural thrombus\par \par NUCLEAR STRESS TEST 1/2020:\par 1.  INferior/inferoseptal infarct with EF 22%\par 2. No ischemia\par \par LE ARTERIAL DUPLEX\par 1. MOderate plaque\par 2. No hemodynamically significant stenosis\par \par EMILEE/PVR\par 1. R 0.86\par 2. L 0.82\par 3. PVR suggestive of inflow disease\par \par

## 2023-07-03 NOTE — HISTORY OF PRESENT ILLNESS
[FreeTextEntry1] : Patient is a 72 yo M with tobacco dependence,  HLD, COPD, PAD s/p PCI, mixed CMP s/p CRT-D 8/2020, AAA s/p EVAR 2/2023 here for follow up.  On disability for lung cancer, remains in remission. Had chemotherapy years ago. Also had bladder cancer 2021 s/p excision without need for chemo. \par \par  Was noted to be having increasing STEVENSON, noted by his wife in fall 2019.. Had echo and stress testing, found to have severe CMP and coreg/entresto started. Underwent cath as well and  s/p LAD stent 2/2020. Underwent CRT-D subsequently due to no improvement in EF\par \par Patient  underwent catheter ablation of PVCs 10/2021. Had initially been tried on Amio with improvement in PVC burden, better BiV pacing and improved EF. Stopped due to side effects and now s/p ablation with Dr. Peters. \par \par Was in [place] fall 2022 for COVID, BP lower than baseline and meds cut back. Was in [place] then home\par \par \par States feels at baseline now. Strength good and no CP/SOB. Patient denies PND/orthopnea/edema/palpitations/syncope/claudication. Monitoring BP at home after last OV and had cuff calibrated 3/2023. No new symptoms and feels well. 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 Lives with his wife. Has 3 kids and couple grandkids. \par  \par ROS: GI and  negative

## 2023-07-03 NOTE — DISCUSSION/SUMMARY
[EKG obtained to assist in diagnosis and management of assessed problem(s)] : EKG obtained to assist in diagnosis and management of assessed problem(s) [FreeTextEntry1] : Patient is a 73 yo M with tobacco dependence, HLD, COPD, aortic atherosclerosis, AAA with mural thrombus s/p EVAR 2/2023,  PAD, CAD s/p PCI LAD 2/2020, LBBB, mixed CMP,  CRT-D fall 2020 here for cardiac follow up.  \par \par \par -Testing has showed severe CMP that is multifactorial, does have CAD and s/p PCI but no improvement in CMP. Maybe related to prior EtOH and substance abuse years ago, possibly genetic. No myocarditis. Also significant PVCs and improved with Amio therapy with better % CRT pacing from PVC suppression\par \par -PHARM nuclear stress test 1/2022 without ischemia, infarct inferiorly appears smaller and EF improved. Interestingly CAD was all LAD and not RCA. ? if scar/defect was all non-ischemic and has improved with meds/CRT and PVC ablation. Still with RVE \par \par -s/p PVC ablation 10/2021 and doing well, prior had been on amio\par \par -Echo 2/2023 with significant improvement in LV function, EF 50-55%. Continued right sided chamber enlargement. Mild MR not significant, surveillance only \par \par -Recurrent bladder cancer 6/2021 and s/p excision without need for chemo\par \par -Follows with Dr Crump for significant PAD. Has a 5.4cm infrarenal AAA with layered thrombus and right iliac artery stent with endocleak. Went for aortogram with embolization of right hypogastric artery and  endovascular repair of AAA 2/2023. Tolerated well. Seen by vascular 4/2023 and stable \par \par -BP at home recently 110-120s systolic. Will be getting new cuff and monitor at home then bring in for check in office in both arms and calibrate with ours. IF persistently low at home will add back low dose midodrine, ? component autonomic dysfunction. EF improved so less concern on midodrine but prefer off if can tolerate\par \par 1. CV stable, continue med management of CMP\par 2. Surveillance of CAD and continue med management \par 3. DAPT and statin\par 4. Continue to monitor for PAF, if recurrence will need to re-assess A/C but none recently. \par 5. continue current doses carvedilol/entresto, no BP room to add MRA or farxiga at this time and EF better\par 6. Increase physical activity as tolerated \par 7. Follow up 3 months, CRT check then as well \par 8. BW prior to follow up

## 2023-07-12 ENCOUNTER — APPOINTMENT (OUTPATIENT)
Dept: VASCULAR SURGERY | Facility: CLINIC | Age: 73
End: 2023-07-12

## 2023-08-01 ENCOUNTER — APPOINTMENT (OUTPATIENT)
Dept: VASCULAR SURGERY | Facility: CLINIC | Age: 73
End: 2023-08-01

## 2023-08-17 ENCOUNTER — EMERGENCY (EMERGENCY)
Facility: HOSPITAL | Age: 73
LOS: 1 days | Discharge: HOME | End: 2023-08-17
Attending: EMERGENCY MEDICINE
Payer: MEDICARE

## 2023-08-17 VITALS
RESPIRATION RATE: 20 BRPM | OXYGEN SATURATION: 97 % | HEART RATE: 81 BPM | DIASTOLIC BLOOD PRESSURE: 89 MMHG | SYSTOLIC BLOOD PRESSURE: 133 MMHG | TEMPERATURE: 98 F

## 2023-08-17 VITALS
HEART RATE: 82 BPM | RESPIRATION RATE: 20 BRPM | DIASTOLIC BLOOD PRESSURE: 81 MMHG | TEMPERATURE: 98 F | SYSTOLIC BLOOD PRESSURE: 115 MMHG | WEIGHT: 174.83 LBS | OXYGEN SATURATION: 98 %

## 2023-08-17 DIAGNOSIS — Z90.89 ACQUIRED ABSENCE OF OTHER ORGANS: Chronic | ICD-10-CM

## 2023-08-17 DIAGNOSIS — Z95.5 PRESENCE OF CORONARY ANGIOPLASTY IMPLANT AND GRAFT: Chronic | ICD-10-CM

## 2023-08-17 DIAGNOSIS — Z98.890 OTHER SPECIFIED POSTPROCEDURAL STATES: Chronic | ICD-10-CM

## 2023-08-17 DIAGNOSIS — I73.9 PERIPHERAL VASCULAR DISEASE, UNSPECIFIED: Chronic | ICD-10-CM

## 2023-08-17 DIAGNOSIS — Z96.649 PRESENCE OF UNSPECIFIED ARTIFICIAL HIP JOINT: Chronic | ICD-10-CM

## 2023-08-17 DIAGNOSIS — Z95.810 PRESENCE OF AUTOMATIC (IMPLANTABLE) CARDIAC DEFIBRILLATOR: Chronic | ICD-10-CM

## 2023-08-17 LAB
ALBUMIN SERPL ELPH-MCNC: 3.8 G/DL — SIGNIFICANT CHANGE UP (ref 3.3–5.2)
ALP SERPL-CCNC: 141 U/L — HIGH (ref 40–120)
ALT FLD-CCNC: 10 U/L — SIGNIFICANT CHANGE UP
ANION GAP SERPL CALC-SCNC: 13 MMOL/L — SIGNIFICANT CHANGE UP (ref 5–17)
APTT BLD: 44.6 SEC — HIGH (ref 24.5–35.6)
AST SERPL-CCNC: 16 U/L — SIGNIFICANT CHANGE UP
BASOPHILS # BLD AUTO: 0.07 K/UL — SIGNIFICANT CHANGE UP (ref 0–0.2)
BASOPHILS NFR BLD AUTO: 0.9 % — SIGNIFICANT CHANGE UP (ref 0–2)
BILIRUB SERPL-MCNC: 0.4 MG/DL — SIGNIFICANT CHANGE UP (ref 0.4–2)
BUN SERPL-MCNC: 20.5 MG/DL — HIGH (ref 8–20)
CALCIUM SERPL-MCNC: 8.6 MG/DL — SIGNIFICANT CHANGE UP (ref 8.4–10.5)
CHLORIDE SERPL-SCNC: 101 MMOL/L — SIGNIFICANT CHANGE UP (ref 96–108)
CO2 SERPL-SCNC: 20 MMOL/L — LOW (ref 22–29)
CREAT SERPL-MCNC: 1.2 MG/DL — SIGNIFICANT CHANGE UP (ref 0.5–1.3)
EGFR: 63 ML/MIN/1.73M2 — SIGNIFICANT CHANGE UP
EOSINOPHIL # BLD AUTO: 0.45 K/UL — SIGNIFICANT CHANGE UP (ref 0–0.5)
EOSINOPHIL NFR BLD AUTO: 5.9 % — SIGNIFICANT CHANGE UP (ref 0–6)
GLUCOSE SERPL-MCNC: 112 MG/DL — HIGH (ref 70–99)
HCT VFR BLD CALC: 38.9 % — LOW (ref 39–50)
HGB BLD-MCNC: 13.2 G/DL — SIGNIFICANT CHANGE UP (ref 13–17)
IMM GRANULOCYTES NFR BLD AUTO: 0.3 % — SIGNIFICANT CHANGE UP (ref 0–0.9)
INR BLD: 1.12 RATIO — SIGNIFICANT CHANGE UP (ref 0.85–1.18)
LYMPHOCYTES # BLD AUTO: 2.47 K/UL — SIGNIFICANT CHANGE UP (ref 1–3.3)
LYMPHOCYTES # BLD AUTO: 32.5 % — SIGNIFICANT CHANGE UP (ref 13–44)
MCHC RBC-ENTMCNC: 29.4 PG — SIGNIFICANT CHANGE UP (ref 27–34)
MCHC RBC-ENTMCNC: 33.9 GM/DL — SIGNIFICANT CHANGE UP (ref 32–36)
MCV RBC AUTO: 86.6 FL — SIGNIFICANT CHANGE UP (ref 80–100)
MONOCYTES # BLD AUTO: 0.51 K/UL — SIGNIFICANT CHANGE UP (ref 0–0.9)
MONOCYTES NFR BLD AUTO: 6.7 % — SIGNIFICANT CHANGE UP (ref 2–14)
NEUTROPHILS # BLD AUTO: 4.08 K/UL — SIGNIFICANT CHANGE UP (ref 1.8–7.4)
NEUTROPHILS NFR BLD AUTO: 53.7 % — SIGNIFICANT CHANGE UP (ref 43–77)
PLATELET # BLD AUTO: 145 K/UL — LOW (ref 150–400)
POTASSIUM SERPL-MCNC: 4.3 MMOL/L — SIGNIFICANT CHANGE UP (ref 3.5–5.3)
POTASSIUM SERPL-SCNC: 4.3 MMOL/L — SIGNIFICANT CHANGE UP (ref 3.5–5.3)
PROT SERPL-MCNC: 7.3 G/DL — SIGNIFICANT CHANGE UP (ref 6.6–8.7)
PROTHROM AB SERPL-ACNC: 12.4 SEC — SIGNIFICANT CHANGE UP (ref 9.5–13)
RBC # BLD: 4.49 M/UL — SIGNIFICANT CHANGE UP (ref 4.2–5.8)
RBC # FLD: 15.1 % — HIGH (ref 10.3–14.5)
SODIUM SERPL-SCNC: 134 MMOL/L — LOW (ref 135–145)
TROPONIN T SERPL-MCNC: <0.01 NG/ML — SIGNIFICANT CHANGE UP (ref 0–0.06)
WBC # BLD: 7.6 K/UL — SIGNIFICANT CHANGE UP (ref 3.8–10.5)
WBC # FLD AUTO: 7.6 K/UL — SIGNIFICANT CHANGE UP (ref 3.8–10.5)

## 2023-08-17 PROCEDURE — 70450 CT HEAD/BRAIN W/O DYE: CPT | Mod: MA

## 2023-08-17 PROCEDURE — 70498 CT ANGIOGRAPHY NECK: CPT | Mod: 26,MA

## 2023-08-17 PROCEDURE — 99284 EMERGENCY DEPT VISIT MOD MDM: CPT | Mod: 25

## 2023-08-17 PROCEDURE — 99285 EMERGENCY DEPT VISIT HI MDM: CPT | Mod: GC

## 2023-08-17 PROCEDURE — 0042T: CPT | Mod: MA

## 2023-08-17 PROCEDURE — 85025 COMPLETE CBC W/AUTO DIFF WBC: CPT

## 2023-08-17 PROCEDURE — 70496 CT ANGIOGRAPHY HEAD: CPT | Mod: 26,MA

## 2023-08-17 PROCEDURE — 80053 COMPREHEN METABOLIC PANEL: CPT

## 2023-08-17 PROCEDURE — 70498 CT ANGIOGRAPHY NECK: CPT | Mod: MA

## 2023-08-17 PROCEDURE — 70450 CT HEAD/BRAIN W/O DYE: CPT | Mod: 26,MA,59

## 2023-08-17 PROCEDURE — 70496 CT ANGIOGRAPHY HEAD: CPT | Mod: MA

## 2023-08-17 PROCEDURE — 82962 GLUCOSE BLOOD TEST: CPT

## 2023-08-17 PROCEDURE — 85730 THROMBOPLASTIN TIME PARTIAL: CPT

## 2023-08-17 PROCEDURE — 96374 THER/PROPH/DIAG INJ IV PUSH: CPT | Mod: XU

## 2023-08-17 PROCEDURE — 85610 PROTHROMBIN TIME: CPT

## 2023-08-17 PROCEDURE — 84484 ASSAY OF TROPONIN QUANT: CPT

## 2023-08-17 PROCEDURE — 36415 COLL VENOUS BLD VENIPUNCTURE: CPT

## 2023-08-17 RX ORDER — ONDANSETRON 8 MG/1
4 TABLET, FILM COATED ORAL ONCE
Refills: 0 | Status: COMPLETED | OUTPATIENT
Start: 2023-08-17 | End: 2023-08-17

## 2023-08-17 RX ORDER — SODIUM CHLORIDE 9 MG/ML
1000 INJECTION INTRAMUSCULAR; INTRAVENOUS; SUBCUTANEOUS ONCE
Refills: 0 | Status: COMPLETED | OUTPATIENT
Start: 2023-08-17 | End: 2023-08-17

## 2023-08-17 RX ORDER — MECLIZINE HCL 12.5 MG
1 TABLET ORAL
Qty: 14 | Refills: 0
Start: 2023-08-17 | End: 2023-08-30

## 2023-08-17 RX ORDER — MECLIZINE HCL 12.5 MG
50 TABLET ORAL ONCE
Refills: 0 | Status: COMPLETED | OUTPATIENT
Start: 2023-08-17 | End: 2023-08-17

## 2023-08-17 RX ADMIN — SODIUM CHLORIDE 1000 MILLILITER(S): 9 INJECTION INTRAMUSCULAR; INTRAVENOUS; SUBCUTANEOUS at 15:17

## 2023-08-17 RX ADMIN — ONDANSETRON 4 MILLIGRAM(S): 8 TABLET, FILM COATED ORAL at 15:17

## 2023-08-17 RX ADMIN — Medication 50 MILLIGRAM(S): at 15:17

## 2023-08-17 NOTE — ED PROVIDER NOTE - NSICDXPASTMEDICALHX_GEN_ALL_CORE_FT
PAST MEDICAL HISTORY:  2019 novel coronavirus disease (COVID-19)     AAA (abdominal aortic aneurysm) with mural thrombus    Bipolar 1 disorder     Bladder cancer 2009 s/p BCG; reoccurence 2021    CAD (coronary artery disease) s/p mLAD SHANTHI    Cataract     COPD (chronic obstructive pulmonary disease)     Frequent PVCs     H/O cardiomyopathy mixed cardiomyopahty s/p MDT  CRT-D    HFrEF (heart failure with reduced ejection fraction) EF 25-30% (echo 6/2020); EF 50-55% )ecgo 6/2021)    History of ETOH abuse sober x 20 years    History of syphilis     HLD (hyperlipidemia)     Left bundle branch block (LBBB)     Lung cancer in remission s/p chemo and XRT 2018    PVD (peripheral vascular disease) stents X2

## 2023-08-17 NOTE — ED PROVIDER NOTE - NSFOLLOWUPINSTRUCTIONS_ED_ALL_ED_FT
-Please follow-up with your primary care doctor in the next 2 days.  Please call tomorrow for an appointment.  If you cannot follow-up with your primary care doctor please return to the ED for any urgent issues.  - You were given a copy of the tests performed today.  Please bring the results with you and review them with your primary care doctor.  - If you have any worsening of symptoms or any other concerns please return to the ED immediately.  - Please continue taking your home medications as directed.      Vertigo      Vertigo is the feeling that you or your surroundings are moving when they are not. This feeling can come and go at any time. Vertigo often goes away on its own. Vertigo can be dangerous if it occurs while you are doing something that could endanger yourself or others, such as driving or operating machinery.    Your health care provider will do tests to try to determine the cause of your vertigo. Tests will also help your health care provider decide how best to treat your condition.      Follow these instructions at home:      Eating and drinking       A comparison of three sample cups showing dark yellow, yellow, and pale yellow urine.       A sign showing that a person should not drink beer, wine, or liquor.     •Dehydration can make vertigo worse. Drink enough fluid to keep your urine pale yellow.      • Do not drink alcohol.      Activity     •Return to your normal activities as told by your health care provider. Ask your health care provider what activities are safe for you.      •In the morning, first sit up on the side of the bed. When you feel okay, stand slowly while you hold onto something until you know that your balance is fine.      •Move slowly. Avoid sudden body or head movements or certain positions, as told by your health care provider.      •If you have trouble walking or keeping your balance, try using a cane for stability. If you feel dizzy or unstable, sit down right away.      •Avoid doing any tasks that would cause danger to you or others if vertigo occurs.      •Avoid bending down if you feel dizzy. Place items in your home so that they are easy for you to reach without bending or leaning over.      • Do not drive or use machinery if you feel dizzy.      General instructions     •Take over-the-counter and prescription medicines only as told by your health care provider.      •Keep all follow-up visits. This is important.        Contact a health care provider if:    •Your medicines do not relieve your vertigo or they make it worse.      •Your condition gets worse or you develop new symptoms.      •You have a fever.      •You develop nausea or vomiting, or if nausea gets worse.      •Your family or friends notice any behavioral changes.      •You have numbness or a prickling and tingling sensation in part of your body.        Get help right away if you:    •Are always dizzy or you faint.      •Develop severe headaches.      •Develop a stiff neck.      •Develop sensitivity to light.      •Have difficulty moving or speaking.      •Have weakness in your hands, arms, or legs.      •Have changes in your hearing or vision.      These symptoms may represent a serious problem that is an emergency. Do not wait to see if the symptoms will go away. Get medical help right away. Call your local emergency services (911 in the [place] Do not drive yourself to the hospital.       Summary    •Vertigo is the feeling that you or your surroundings are moving when they are not.      •Your health care provider will do tests to try to determine the cause of your vertigo.      •Follow instructions for home care. You may be told to avoid certain tasks, positions, or movements.      •Contact a health care provider if your medicines do not relieve your symptoms, or if you have a fever, nausea, vomiting, or changes in behavior.      •Get help right away if you have severe headaches or difficulty speaking, or you develop hearing or vision problems.      This information is not intended to replace advice given to you by your health care provider. Make sure you discuss any questions you have with your health care provider.

## 2023-08-17 NOTE — ED PROVIDER NOTE - PHYSICAL EXAMINATION
Gen: Well appearing in NAD  Head: NC/AT  Neck: trachea midline  Card: regular rate and rhythm  Resp:  CTAB  Abd: soft, non-distended, non-tender  Ext: no deformities above reported baseline  Neuro:  A&Ox4, CN 2-12 intact, No tremors. No fasciculations. No nystagmus. Normal finger to nose and heel to shin b/l. No dysmetria.  Normal gait. Normal sensation. Normal strength.   Skin:  Warm and dry as visualized  Psych:  Normal affect and mood

## 2023-08-17 NOTE — ED PROVIDER NOTE - ATTENDING CONTRIBUTION TO CARE
I, Tay Lane, performed a face to face bedside interview with this patient regarding history of present illness, review of symptoms and relevant past medical, social and family history.  I completed an independent physical examination. I have communicated the patient’s plan of care and disposition with the resident.  73 year old male presents with dizziness. Pt reports room spinning dizziness that started upon waking up this am. LKW 6 pm last night. Sx worse with positional changes  Gen: NAD, well appearing  CV: RRR  Pul: CTA b/l  Abd: Soft, non-distended, non-tender  Neuro: no focal deficits, NIH 0  Presentation consistent with BPPV, pt states he feels funmi rand ambulates with non-ataxic gait, stable for dc

## 2023-08-17 NOTE — ED PROVIDER NOTE - CLINICAL SUMMARY MEDICAL DECISION MAKING FREE TEXT BOX
pt presenting w/ dizziness and headache. last known well 1800 yesterday. code stroke activated. Pt treated w/ meclizine and zofran as well as NS. pt presenting w/ dizziness and headache. last known well 1800 yesterday. code stroke activated. Pt treated w/ meclizine and zofran as well as NS. Workup is unremarkable for any emergent process.   Patient is now feeling improved and wishes to leave.  Patient / family members have capacity.    Patient/family was given full return precautions, counseled on red flag symptoms such as LOC, fever, severe pain, or focal deficits and advised to return to the ED for these reasons or any reason that was concerning to them. Patient/family was informed of all significant and incidental findings found on this workup today and all results were reviewed.   All questions were answered, advised to make close follow up with their primary care provider and specialty clinics (as applicable) to follow up with this visit and continue investigation/treatment.   Patient/family has shown adequate understanding and is agreeable to the plan.

## 2023-08-17 NOTE — ED PROVIDER NOTE - CARE PROVIDER_API CALL
[facility]  Neurology  [address] [place] [address], [facility] [address]  [place] [address]  Phone: [phone]  Fax: [phone]  Follow Up Time:

## 2023-08-17 NOTE — ED PROVIDER NOTE - PATIENT PORTAL LINK FT
You can access the FollowMyHealth Patient Portal offered by [facility] by registering at the following website: [email or web address]. By joining [employer]’s FollowMyHealth portal, you will also be able to view your health information using other applications (apps) compatible with our system.

## 2023-08-17 NOTE — ED PROVIDER NOTE - OBJECTIVE STATEMENT
73-year-old male patient presenting with room spinning dizziness that he noticed when he woke up this morning at 5 AM.  Patient went to bed at 6 PM yesterday and was feeling well.  He reports he went to urgent care today because of his dizziness and was feeling generally unwell w/ headache.  He denies chest pain, shortness of breath, numbness, tingling, focal weakness, vomiting, diarrhea, abdominal pain, back pain, dysuria, hematuria, or any other complaints.

## 2023-08-17 NOTE — ED ADULT TRIAGE NOTE - CHIEF COMPLAINT QUOTE
C/O dizziness as if the room is spinning associated with nausea and headache since 5am this morning. Pt states he feels "off". No facial droop, numbness or weakness. Has pacemaker and defibrillator. Code stroke activated. Fs 122.

## 2023-08-17 NOTE — ED ADULT NURSE NOTE - OBJECTIVE STATEMENT
pt comes to ED with reports of dizziness that began yesterday, states he felt sick so went to bed at 6pm and was dizzy when he woke up. pt awake alert and oreinted, even and unlabored resps no chest pain no SOB skin warm dry and intact, PULLIAM with strength and purpose, denies fevers or recent illness. code stroke activated by triage RN.

## 2023-08-22 ENCOUNTER — NON-APPOINTMENT (OUTPATIENT)
Age: 73
End: 2023-08-22

## 2023-09-26 NOTE — H&P PST ADULT - I HAVE PERSONALLY SEEN AND EXAMINED THE PATIENT. THERE HAVE NOT BEEN ANY CHANGES IN THE PATIENT'S HISTORY OR EXAM UNLESS COMMENTED BELOW
Statement Selected
Neri Pearce)  Otolaryngology  [address], [address]  [place], [place] [address]  Phone: [phone]  Fax: [phone]  Established Patient  Follow Up Time: 1 week

## 2023-10-11 ENCOUNTER — APPOINTMENT (OUTPATIENT)
Dept: CARDIOLOGY | Facility: CLINIC | Age: 73
End: 2023-10-11
Payer: MEDICARE

## 2023-10-11 VITALS
BODY MASS INDEX: 25.62 KG/M2 | WEIGHT: 173 LBS | HEIGHT: 69 IN | RESPIRATION RATE: 16 BRPM | HEART RATE: 89 BPM | SYSTOLIC BLOOD PRESSURE: 102 MMHG | DIASTOLIC BLOOD PRESSURE: 68 MMHG

## 2023-10-11 PROCEDURE — 93000 ELECTROCARDIOGRAM COMPLETE: CPT

## 2023-10-11 PROCEDURE — 99214 OFFICE O/P EST MOD 30 MIN: CPT

## 2023-10-11 PROCEDURE — 93289 INTERROG DEVICE EVAL HEART: CPT

## 2023-10-11 RX ORDER — DOXYCYCLINE HYCLATE 100 MG/1
100 TABLET ORAL
Qty: 14 | Refills: 0 | Status: DISCONTINUED | COMMUNITY
Start: 2023-05-02 | End: 2023-10-11

## 2023-10-11 RX ORDER — CLONAZEPAM 1 MG/1
1 TABLET ORAL DAILY
Refills: 0 | Status: DISCONTINUED | COMMUNITY
End: 2023-10-11

## 2023-12-01 ENCOUNTER — RX RENEWAL (OUTPATIENT)
Age: 73
End: 2023-12-01

## 2023-12-01 RX ORDER — SACUBITRIL AND VALSARTAN 24; 26 MG/1; MG/1
24-26 TABLET, FILM COATED ORAL
Qty: 180 | Refills: 3 | Status: ACTIVE | COMMUNITY
Start: 2022-10-13 | End: 1900-01-01

## 2024-01-12 ENCOUNTER — APPOINTMENT (OUTPATIENT)
Dept: CARDIOLOGY | Facility: CLINIC | Age: 74
End: 2024-01-12
Payer: MEDICARE

## 2024-01-12 ENCOUNTER — NON-APPOINTMENT (OUTPATIENT)
Age: 74
End: 2024-01-12

## 2024-01-12 VITALS
BODY MASS INDEX: 25.18 KG/M2 | DIASTOLIC BLOOD PRESSURE: 65 MMHG | WEIGHT: 170 LBS | SYSTOLIC BLOOD PRESSURE: 96 MMHG | RESPIRATION RATE: 16 BRPM | HEART RATE: 83 BPM | HEIGHT: 69 IN | OXYGEN SATURATION: 99 %

## 2024-01-12 DIAGNOSIS — I25.10 ATHEROSCLEROTIC HEART DISEASE OF NATIVE CORONARY ARTERY W/OUT ANGINA PECTORIS: ICD-10-CM

## 2024-01-12 DIAGNOSIS — Z01.810 ENCOUNTER FOR PREPROCEDURAL CARDIOVASCULAR EXAMINATION: ICD-10-CM

## 2024-01-12 DIAGNOSIS — E78.5 HYPERLIPIDEMIA, UNSPECIFIED: ICD-10-CM

## 2024-01-12 DIAGNOSIS — F17.200 NICOTINE DEPENDENCE, UNSPECIFIED, UNCOMPLICATED: ICD-10-CM

## 2024-01-12 PROCEDURE — 93306 TTE W/DOPPLER COMPLETE: CPT

## 2024-01-12 PROCEDURE — 99214 OFFICE O/P EST MOD 30 MIN: CPT

## 2024-01-12 PROCEDURE — 93000 ELECTROCARDIOGRAM COMPLETE: CPT | Mod: NC

## 2024-01-12 PROCEDURE — 93289 INTERROG DEVICE EVAL HEART: CPT

## 2024-01-12 NOTE — DISCUSSION/SUMMARY
[EKG obtained to assist in diagnosis and management of assessed problem(s)] : EKG obtained to assist in diagnosis and management of assessed problem(s) [FreeTextEntry1] : Patient is a 75 yo M with tobacco dependence, HLD, COPD, aortic atherosclerosis, AAA with mural thrombus s/p EVAR 2/2023,  PAD, CAD s/p PCI LAD 2/2020, LBBB, mixed CMP,  CRT-D fall 2020 here for cardiac follow up.   -Testing has showed severe CMP that is multifactorial, does have CAD and s/p PCI but no improvement in CMP after revascularization. . Maybe related to prior EtOH and substance abuse years ago, possibly genetic. No myocarditis. Also significant PVCs and improved with Amio therapy with better % CRT pacing from PVC suppression. EF has now improved significantly -s/p PVC ablation 10/2021 and doing well, prior had been on amio  -Echo 1/2024 with continued stasble improvement in LV function, EF 50-55%. Continued right sided chamber enlargement. Mild MR-mod not significant, surveillance only  -Recurrent bladder cancer 6/2021 and s/p excision without need for chemo -Follows with Dr Crump for significant PAD. Has a 5.4cm infrarenal AAA with layered thrombus and right iliac artery stent with endocleak. Went for aortogram with embolization of right hypogastric artery and  endovascular repair of AAA 2/2023. Tolerated well. Seen by vascular 4/2023 and stable  -CV stable at this time, normal CRT function. Has short runs NSVT but controlled and nothing sustained, no change in meds at this time -In need of bladder tumor resection planned 1/22/2024  1. CV stable, continue med management of CMP 2. Surveillance of CAD and continue med management  3. DAPT and statin 4. Continue to monitor for PAF, if recurrence will need to re-assess A/C but none recently.  5. continue current doses carvedilol/entresto, no BP room to add MRA or farxiga at this time and EF better 6. Increase physical activity as tolerated  7. Follow up 3 months for  CRT interogation, office follow up 6 months  8. CV stable at low-moderate risk for low risk urologic surgery, may hold asa/plavix 7 days prior and restart post op as soon as acceptable bleeding risk

## 2024-01-12 NOTE — HISTORY OF PRESENT ILLNESS
[FreeTextEntry1] : Patient is a 75 yo M with tobacco dependence,  HLD, COPD, PAD s/p PCI, mixed CMP s/p CRT-D 8/2020, AAA s/p EVAR 2/2023, PVC ablation 10/2021 here for follow up.  On disability for lung cancer, remains in remission. Had chemotherapy years ago. Also had bladder cancer 2021 s/p excision without need for chemo.   Currently doing well, no new complaints. No CP/SOB. 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 Lives with his wife. Has 3 kids and couple grandkids.    ROS: GI and  negative

## 2024-01-12 NOTE — ASSESSMENT
[FreeTextEntry1] : ECG: SR, atrial sensed and BiV paced  BUN 20 Creat 1.2 (8/2023)  BUN 27 Creat 1.6 (3/2021) Na 139 K 4.5 Cl 106 CO2 26 BUN 35 Creat 1.9 (1/2021)   HDL 34 LDL 61  (3/2021)  HDL 30 LDL 98  (11/2019)  CRT-D Check 1/12/2024: Normal function, no events, BiV paced 98% CRT-D Check 10/11/2023: Normal function, short run NSVT, biV paced 97% CRT-D check 7/3/2023: Normal function, 2 brief runs NSVT, BiV paced 98%, 18 months remaining CRT-D check 2/28/2023: Normal Function, BiV paced 99% CRT-D Check 6/21/2022: NOrmal function, BiV paced 99% (not dependent)    ECHO 1/2024 1. Low normal LV function, EF 50-55% 2. Grade I diastolic dysfunction  3. RVE with normal function 4. Moderate OLLIE 5. Mild to moderate MR, mod TR. RVSP 36mmHg  ECHO 2/2023: 1. Normal LV size, low normal function. EF 50-55% 2. Mild RVE with normal function 3. Moderate OLLIE, normal LA 4. Mild MR and TR,m RVSP 35.8mmHg  ECHO 4/2022: 1. NOrmal LV size and function, EF 55% 2. Moderate RVE with normal function 3. Moderate OLLIE 4. Mild TR, RVSP 34mmHg  ECHO 6/2021: 1. TDS/TLS, definity used 2. Low normal LV function, EF 50-55% 3. Grade I diastolic dysfunction 4. RVE with low normal function  ECHO 11/2020: 1. Moderate LV systolic dysfunction, EF 35-40% 2. Grade I diastolic dysfunction 3. Low normal RV function 4. Normal LA/RA 5. Mild to moderate TR, RVSP 31mmHg   ECHO 6/2020: 1. TDS 2. Severe LV dysfunction, EF 25-30% 3. Grade I diastolic dysfunction 4. Mild TR 5. Mild OLLIE   PHARM NUCLEAR STRESS TEST 1/2022: 1. Prior mid-baasl inferior wall infarct, no ischemia 2. Low normal LV function, EF 54% 3. Rare PVCs 4. Infarct smaller compared to 2019 study  NUCLEAR STRESS TEST 1/2020: 1. INferior/inferoseptal infarct with EF 22% 2. No ischemia   C 2/2020: 1. LM normal 2. mLAD 80% stenosis, s/p MARÍA 3.5x15mm stent 3. LCX normal 4. pRCA 30% stenosis 5. mRCA 50% stenosis  EVENT MONITOR 5/2020: 1. 12 days monitored 2. SR 3. Frequent PVCs, 28% burden 4. Short runs NSVT  CT Chest 2018: Scattered aortic calcifications and mural thrombus  AORTIC DUPLEX 3/2022: Large fusiform AAA, distally, 4.2 x 4.5cm EMILEE/PVR 4/2022: 1. R 0.55 L 0.78 2. PVR c/w SFA disease LE ARTERIAL DUPLEX 4/2022:MOderate plaque, no obstruction

## 2024-01-24 NOTE — ED ADULT NURSE NOTE - BEFAST SPEECH PHRASE
Yes
Pt is alert, came to the ER from home due to left lower back to lower leg pain for almost two weeks now. Pt was seen last week and was diagnosed with Sciatica. Took Tylenol at 1 AM but is not heloing.

## 2024-01-31 NOTE — H&P PST ADULT - ASSESSMENT
70 year old male with hyperlipidemia, COPD, PAD s/p stenting, tobacco use, lung cancer (in remission; s/p chemo + XRT in 2018), coronary artery disease (s/p mLAD SHANTHI), LBBB and mixed cardiomyopathy with NYHA IIC HFrEF (LVEF: 25-30%). To summarize his history, he was first evaluated by Dr. Benito in 11/2019 after being found to have a LBBB on his ECG. At the time his wife noted that he had dyspnea when walking up stairs. He underwent TTE and nuclear stress testing which demonstrated depressed LVEF of 20-25%. He underwent cardiac catheterization which revealed 80% mLAD disease which was treated with a SHANTHI. He was initiated on GDMT and despite over 3 months of GDMT, his LVEF has remained low at 25-30%. He presents electively today for MDT CRT-D implant for cardiac resynchronization and primary prevention.     Plan:  Stat Labs & EKG  NPO  Consent to be done by Attending  Covid negative 8/24/2020 4 = No assist / stand by assistance 70 year old male with hyperlipidemia, COPD, PAD s/p stenting, tobacco use, lung cancer (in remission; s/p chemo + XRT in 2018), coronary artery disease (s/p mLAD SHANTHI), LBBB and mixed cardiomyopathy with NYHA IIC HFrEF (LVEF: 25-30%). To summarize his history, he was first evaluated by Dr. Benito in 11/2019 after being found to have a LBBB on his ECG. At the time his wife noted that he had dyspnea when walking up stairs. He underwent TTE and nuclear stress testing which demonstrated depressed LVEF of 20-25%. He underwent cardiac catheterization which revealed 80% mLAD disease which was treated with a SHANTHI. He was initiated on GDMT and despite over 3 months of GDMT, his LVEF has remained low at 25-30%. He presents electively today for MDT CRT-D implant for cardiac resynchronization and primary prevention.     Plan:  Stat Labs & EKG  NPO confirmed >10 hours, keep npo  Consent to be done by Attending  Covid negative 8/24/2020

## 2024-02-02 ENCOUNTER — APPOINTMENT (OUTPATIENT)
Dept: VASCULAR SURGERY | Facility: CLINIC | Age: 74
End: 2024-02-02
Payer: MEDICARE

## 2024-02-02 VITALS
TEMPERATURE: 97.5 F | HEIGHT: 69 IN | RESPIRATION RATE: 16 BRPM | WEIGHT: 169 LBS | HEART RATE: 90 BPM | DIASTOLIC BLOOD PRESSURE: 68 MMHG | BODY MASS INDEX: 25.03 KG/M2 | OXYGEN SATURATION: 98 % | SYSTOLIC BLOOD PRESSURE: 102 MMHG

## 2024-02-02 PROCEDURE — 93978 VASCULAR STUDY: CPT

## 2024-02-02 PROCEDURE — 99213 OFFICE O/P EST LOW 20 MIN: CPT

## 2024-02-16 NOTE — PHYSICAL EXAM
[de-identified] : appears well ,  [de-identified] : normal work of breathing  [FreeTextEntry1] : doppler signals  [de-identified] : Soft nontender nondistended. [de-identified] : Bilateral lower extremities without edema.

## 2024-02-16 NOTE — ASSESSMENT
[FreeTextEntry1] : 74-year-old gentleman.  Known peripheral arterial occlusive disease currently asymptomatic.  Regards to his aortic aneurysm has not changed in size since last evaluation with CAT scan.  Will continue with 6 months aortic duplex surveillance.  He is aware to return to the office if he experiences any nonhealing wounds in his feet.  I spent 22 minutes providing care to this patient and reviewing imaging results

## 2024-02-16 NOTE — HISTORY OF PRESENT ILLNESS
[FreeTextEntry1] : 74-year-old male with known peripheral arterial occlusive disease as well as status post endovascular aortic aneurysm repair.  He a CT angiogram of the abdomen pelvis and February 23 which showed no evidence of endoleak he is here for continued follow-up he denies claudication symptoms or any nonhealing wounds.  Denies abdominal pain.

## 2024-02-19 NOTE — ED ADULT NURSE NOTE - SUICIDE SCREENING QUESTION 2
Dr Mike Jeffery at bedside talking to pt, all questions answered, procedure note is given to pt.    No

## 2024-03-26 ENCOUNTER — RX RENEWAL (OUTPATIENT)
Age: 74
End: 2024-03-26

## 2024-03-27 ENCOUNTER — RX RENEWAL (OUTPATIENT)
Age: 74
End: 2024-03-27

## 2024-03-27 RX ORDER — CLOPIDOGREL BISULFATE 75 MG/1
75 TABLET, FILM COATED ORAL
Qty: 90 | Refills: 3 | Status: ACTIVE | COMMUNITY
Start: 2021-04-05 | End: 1900-01-01

## 2024-03-27 RX ORDER — ATORVASTATIN CALCIUM 20 MG/1
20 TABLET, FILM COATED ORAL
Qty: 90 | Refills: 3 | Status: ACTIVE | COMMUNITY
Start: 2021-04-05 | End: 1900-01-01

## 2024-04-01 ENCOUNTER — APPOINTMENT (OUTPATIENT)
Dept: CARDIOLOGY | Facility: CLINIC | Age: 74
End: 2024-04-01
Payer: MEDICARE

## 2024-04-01 PROCEDURE — 93289 INTERROG DEVICE EVAL HEART: CPT

## 2024-05-06 NOTE — H&P PST ADULT - PATIENT ON (OXYGEN DELIVERY METHOD)
[Patient reported PAP Smear was abnormal] : Patient reported PAP Smear was abnormal [Patient reported colonoscopy was normal] : Patient reported colonoscopy was normal [Condoms] : uses condoms [Y] : Positive pregnancy history [No] : Patient does not have concerns regarding sex [Currently Active] : currently active [Men] : men [TextBox_4] : 35YO pt present for GYN annual exam. [TextBox_31] : + hpv  [PapSmeardate] : 12/9/23 [ColonoscopyDate] : 12/9/23 [TextBox_43] : only mucin insufficient for diagnosis  [LMPDate] : 4/30/24 [PGxTotal] : 1 [[facility] CenterxFulerm] : 1 [[facility] Hospitaliving] : 1 [TextBox_6] : 4/30/24 [TextBox_13] : 30 [TextBox_15] : 5-7 [FreeTextEntry1] : 4-30-24 room air

## 2024-05-07 ENCOUNTER — RX RENEWAL (OUTPATIENT)
Age: 74
End: 2024-05-07

## 2024-05-07 RX ORDER — CARVEDILOL 3.12 MG/1
3.12 TABLET, FILM COATED ORAL
Qty: 180 | Refills: 3 | Status: ACTIVE | COMMUNITY
Start: 2022-10-13 | End: 1900-01-01

## 2024-06-24 ENCOUNTER — APPOINTMENT (OUTPATIENT)
Dept: CARDIOLOGY | Facility: CLINIC | Age: 74
End: 2024-06-24
Payer: MEDICARE

## 2024-06-24 ENCOUNTER — NON-APPOINTMENT (OUTPATIENT)
Age: 74
End: 2024-06-24

## 2024-06-24 VITALS
HEIGHT: 69 IN | WEIGHT: 172 LBS | SYSTOLIC BLOOD PRESSURE: 100 MMHG | HEART RATE: 92 BPM | DIASTOLIC BLOOD PRESSURE: 60 MMHG | RESPIRATION RATE: 16 BRPM | BODY MASS INDEX: 25.48 KG/M2

## 2024-06-24 DIAGNOSIS — I73.9 PERIPHERAL VASCULAR DISEASE, UNSPECIFIED: ICD-10-CM

## 2024-06-24 DIAGNOSIS — I51.9 HEART DISEASE, UNSPECIFIED: ICD-10-CM

## 2024-06-24 DIAGNOSIS — I42.9 CARDIOMYOPATHY, UNSPECIFIED: ICD-10-CM

## 2024-06-24 DIAGNOSIS — I47.29 OTHER VENTRICULAR TACHYCARDIA: ICD-10-CM

## 2024-06-24 DIAGNOSIS — I71.40 ABDOMINAL AORTIC ANEURYSM, WITHOUT RUPTURE, UNSPECIFIED: ICD-10-CM

## 2024-06-24 DIAGNOSIS — Z95.810 PRESENCE OF AUTOMATIC (IMPLANTABLE) CARDIAC DEFIBRILLATOR: ICD-10-CM

## 2024-06-24 DIAGNOSIS — I49.3 VENTRICULAR PREMATURE DEPOLARIZATION: ICD-10-CM

## 2024-06-24 DIAGNOSIS — I50.22 CHRONIC SYSTOLIC (CONGESTIVE) HEART FAILURE: ICD-10-CM

## 2024-06-24 DIAGNOSIS — I48.0 PAROXYSMAL ATRIAL FIBRILLATION: ICD-10-CM

## 2024-06-24 PROCEDURE — 99214 OFFICE O/P EST MOD 30 MIN: CPT

## 2024-06-24 PROCEDURE — G2211 COMPLEX E/M VISIT ADD ON: CPT

## 2024-06-24 PROCEDURE — 93000 ELECTROCARDIOGRAM COMPLETE: CPT

## 2024-06-24 NOTE — HISTORY OF PRESENT ILLNESS
[FreeTextEntry1] : Patient is a 73 yo M with tobacco dependence, HLD, COPD, aortic atherosclerosis, AAA with mural thrombus s/p EVAR 2/2023,  PAD, CAD s/p PCI LAD 2/2020, LBBB, mixed CMP,  CRT-D fall 2020, PVC ablation 2021, recurrent bladder cancer 6/2021 and s/p excision without need for chemo here for cardiac follow up.   On disability for lung cancer, remains in remission. Had chemotherapy years ago. Also had bladder cancer 2021 s/p excision without need for chemo.   Currently doing well, no new complaints. No CP. Patient denies PND/orthopnea/edema/palpitations/syncope/claudication  Chronic 0.5mile dyspnea on exertion unchanged.   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 Has 3 kids and couple grandkids.    ROS: GI and  negative

## 2024-06-24 NOTE — ASSESSMENT
[FreeTextEntry1] : ECG: SR, atrial sensed and BiV paced  BUN 20 Creat 1.2 (8/2023)  BUN 27 Creat 1.6 (3/2021) Na 139 K 4.5 Cl 106 CO2 26 BUN 35 Creat 1.9 (1/2021)   HDL 30 LDL 57  (11/2023)  HDL 34 LDL 61  (3/2021)  HDL 30 LDL 98  (11/2019)  CRT-D Check 1/12/2024: Normal function, no events, BiV paced 98% CRT-D Check 10/11/2023: Normal function, short run NSVT, biV paced 97% CRT-D check 7/3/2023: Normal function, 2 brief runs NSVT, BiV paced 98%, 18 months remaining CRT-D check 2/28/2023: Normal Function, BiV paced 99% CRT-D Check 6/21/2022: NOrmal function, BiV paced 99% (not dependent)    ECHO 1/2024 1. Low normal LV function, EF 50-55% 2. Grade I diastolic dysfunction  3. RVE with normal function 4. Moderate OLLIE 5. Mild to moderate MR, mod TR. RVSP 36mmHg  ECHO 2/2023: 1. Normal LV size, low normal function. EF 50-55% 2. Mild RVE with normal function 3. Moderate OLLIE, normal LA 4. Mild MR and TR,m RVSP 35.8mmHg  ECHO 4/2022: 1. NOrmal LV size and function, EF 55% 2. Moderate RVE with normal function 3. Moderate OLLIE 4. Mild TR, RVSP 34mmHg  ECHO 6/2021: 1. TDS/TLS, definity used 2. Low normal LV function, EF 50-55% 3. Grade I diastolic dysfunction 4. RVE with low normal function  ECHO 11/2020: 1. Moderate LV systolic dysfunction, EF 35-40% 2. Grade I diastolic dysfunction 3. Low normal RV function 4. Normal LA/RA 5. Mild to moderate TR, RVSP 31mmHg   ECHO 6/2020: 1. TDS 2. Severe LV dysfunction, EF 25-30% 3. Grade I diastolic dysfunction 4. Mild TR 5. Mild OLLIE   PHARM NUCLEAR STRESS TEST 1/2022: 1. Prior mid-baasl inferior wall infarct, no ischemia 2. Low normal LV function, EF 54% 3. Rare PVCs 4. Infarct smaller compared to 2019 study  NUCLEAR STRESS TEST 1/2020: 1. INferior/inferoseptal infarct with EF 22% 2. No ischemia   [facility] 2/2020: 1. LM normal 2. mLAD 80% stenosis, s/p MARÍA 3.5x15mm stent 3. LCX normal 4. pRCA 30% stenosis 5. mRCA 50% stenosis  EVENT MONITOR 5/2020: 1. 12 days monitored 2. SR 3. Frequent PVCs, 28% burden 4. Short runs NSVT  CT Chest 2018: Scattered aortic calcifications and mural thrombus  AORTIC DUPLEX 3/2022: Large fusiform AAA, distally, 4.2 x 4.5cm EMILEE/PVR 4/2022: 1. R 0.55 L 0.78 2. PVR c/w SFA disease LE ARTERIAL DUPLEX 4/2022:MOderate plaque, no obstruction

## 2024-06-24 NOTE — DISCUSSION/SUMMARY
[EKG obtained to assist in diagnosis and management of assessed problem(s)] : EKG obtained to assist in diagnosis and management of assessed problem(s) [FreeTextEntry1] : Patient is a 75 yo M with tobacco dependence, HLD, COPD, aortic atherosclerosis, AAA with mural thrombus s/p EVAR 2/2023,  PAD, CAD s/p PCI LAD 2/2020, LBBB, mixed CMP,  CRT-D fall 2020, PVC ablation 2021, lung cancer in remission, recurrent bladder cancer 6/2021 and s/p excision without need for chemo, bladder tumor resection again 1/22/2024 here for cardiac follow up.    CAD: -s/p PCI to LAD in 2020, negative nuclear stress 1/2022 -Lipids at goal 11/2023   CMP -Testing has showed severe CMP that is multifactorial, does have CAD and s/p PCI but no improvement in CMP after revascularization. . Maybe related to prior EtOH and substance abuse years ago, possibly genetic. No myocarditis. Also significant PVCs and improved with Amio therapy with better % CRT pacing from PVC suppression. EF has now improved significantly -s/p PVC ablation 10/2021 and doing well, prior had been on amio -Echo 1/2024 with continued stasble improvement in LV function, EF 50-55%. Continued right sided chamber enlargement.  -No need for diuretics, euvolemic. EF improved as well and BP runs low, will not add MRA/SGLT2 inhibitor  MR: -Mild MR-mod not significant, surveillance only . Last echo 1./2024   AAA/PAD -Follows with Dr Crump for significant PAD. Has a 5.4cm infrarenal AAA with layered thrombus and right iliac artery stent with endocleak. Went for aortogram with embolization of right hypogastric artery and  endovascular repair of AAA 2/2023. Tolerated well. Seen by vascular 4/2023 and stable   CRT-D -Last check 4/2024 with normal function ,brief run NSVT without symptoms  1. CV stable, continue med management of CMP 2. Surveillance of CAD and continue med management  3. DAPT and statin 4. Continue to monitor for PAF, if recurrence will need to re-assess A/C but none recently.  5. continue current doses carvedilol/entresto, will not add MRA or farxiga at this time with low BP and EF better 6. Increase physical activity as tolerated  7. CRT check next month, follow up 6 months with echo for CMP/RV dysfunction eval

## 2024-06-24 NOTE — PHYSICAL EXAM
[General Appearance - Well Nourished] : well nourished [General Appearance - Well Developed] : well developed [Normal Conjunctiva] : the conjunctiva exhibited no abnormalities [Normal Oropharynx] : normal oropharynx [Normal Jugular Venous V Waves Present] : normal jugular venous V waves present [] : no respiratory distress [Respiration, Rhythm And Depth] : normal respiratory rhythm and effort [Heart Rate And Rhythm] : heart rate and rhythm were normal [Murmurs] : no murmurs present [Edema] : no peripheral edema present [Bowel Sounds] : normal bowel sounds [Abnormal Walk] : normal gait [Abdomen Soft] : soft [Nail Clubbing] : no clubbing of the fingernails [Cyanosis, Localized] : no localized cyanosis [Skin Color & Pigmentation] : normal skin color and pigmentation [Affect] : the affect was normal [Oriented To Time, Place, And Person] : oriented to person, place, and time [FreeTextEntry1] : no edema

## 2024-07-23 ENCOUNTER — APPOINTMENT (OUTPATIENT)
Dept: CARDIOLOGY | Facility: CLINIC | Age: 74
End: 2024-07-23

## 2024-07-23 PROCEDURE — 93289 INTERROG DEVICE EVAL HEART: CPT

## 2024-08-06 ENCOUNTER — APPOINTMENT (OUTPATIENT)
Dept: VASCULAR SURGERY | Facility: CLINIC | Age: 74
End: 2024-08-06

## 2024-08-06 PROCEDURE — 99212 OFFICE O/P EST SF 10 MIN: CPT

## 2024-08-06 PROCEDURE — 93978 VASCULAR STUDY: CPT

## 2024-08-28 ENCOUNTER — APPOINTMENT (OUTPATIENT)
Dept: CARDIOLOGY | Facility: CLINIC | Age: 74
End: 2024-08-28
Payer: MEDICARE

## 2024-08-28 PROCEDURE — 93295 DEV INTERROG REMOTE 1/2/MLT: CPT

## 2024-08-28 PROCEDURE — 93296 REM INTERROG EVL PM/IDS: CPT

## 2024-09-09 NOTE — ASU PREOP CHECKLIST - NS PREOP CHK MONITOR ANESTHESIA CONSENT
Chief Complaint   Patient presents with    General Visit     F/u     Vitals were taken and medications were reconciled.     ELMIRA Gunn     done

## 2024-09-12 ENCOUNTER — RX RENEWAL (OUTPATIENT)
Age: 74
End: 2024-09-12

## 2024-10-15 ENCOUNTER — APPOINTMENT (OUTPATIENT)
Dept: CARDIOLOGY | Facility: CLINIC | Age: 74
End: 2024-10-15
Payer: MEDICARE

## 2024-10-15 PROCEDURE — 93289 INTERROG DEVICE EVAL HEART: CPT

## 2024-11-04 NOTE — ED PROVIDER NOTE - IV ALTEPLASE EXCL ABS HIDDEN
[facility] Hospitalist Progress Note      Subjective  Has developed new pruritic rash.  Otherwise no new complaints    Objective:    I/O's    Intake/Output Summary (Last 24 hours) at 11/4/2024 1220  Last data filed at 11/4/2024 0943  Gross per 24 hour   Intake 150 ml   Output 450 ml   Net -300 ml          Last Recorded Vitals  Temp:  [97.7 °F (36.5 °C)-98.1 °F (36.7 °C)] 98.1 °F (36.7 °C)  Heart Rate:  [70-84] 84  Resp:  [16] 16  BP: (137-156)/(81-84) 156/84     Physical Exam:  Constitutional: well-developed, well-nourished, no acute distress.   HENT: Head Normocephalic and atraumatic. PERRL. EOMI, oral mucosa moist, no oral thrush  Neck: supple, no JVD  Cardiovascular: S1 S2 nl, RRR, normal heart sounds, no murmurs.  Pulmonary/Chest: Breath sounds normal bilaterally.  No audible crackles or wheezing  Abdominal: Soft, no tenderness or distention, bowel sounds active, no organomegaly.  Musculoskeletal: Normal range of motion. No edema.   Neurological: AAOx3, no focal neurological deficits, cranial nerves II-XII grossly intact  Skin: Skin is warm, No rash noted.         Labs     Lab Results   Component Value Date    WBC 4.2 10/31/2024    WBC 5.3 10/11/2020    HGB 11.5 (L) 10/31/2024    HGB 11.2 (L) 10/11/2020     (L) 10/31/2024     (L) 10/11/2020    SODIUM 135 10/31/2024    SODIUM 137 10/11/2020    POTASSIUM 4.4 10/31/2024    POTASSIUM 3.1 (L) 10/11/2020    BUN 8 10/31/2024    BUN 31 (H) 10/11/2020    CREATININE 0.55 10/31/2024    CREATININE 1.06 (H) 10/11/2020    AST 16 10/23/2024    AST 60 (H) 06/19/2020    GPT 22 10/23/2024    GPT 61 06/19/2020    ALKPT 58 10/23/2024    ALKPT 114 06/19/2020    LIPA 40 02/20/2024    LIPA 133 08/25/2022    LIPA 183 08/24/2016    LACTA <0.3 10/01/2024    HGBA1C 5.8 (H) 07/24/2023    MG 2.0 10/30/2024    MG 2.4 10/11/2020    PHOS 5.0 (H) 10/30/2024    PHOS 3.3 01/12/2019    PCT <0.05 10/01/2024    CALCLDL 106 07/24/2023    CALCLDL 156 (H) 10/01/2020    HDL 38 (L) 07/24/2023     HDL 43 (L) 10/01/2020    TRIGLYCERIDE 387 (H) 07/24/2023    TRIGLYCERIDE 263 (H) 10/01/2020      Assessment and Plan    Refractory trigeminal neuralgia s/p microvascular decompression on 9/19   L posterior auricular pseudomeningocele, 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   Recent hx of E faecalis bacteremia and meningitis  - continue Duloxetine and pregabalin  - On a Prolonged Carbamazepine taper per outpatient neurologist  - CT of the head 10/28 shows ventricular catheter in place ventricular system is decompressed stable size of the left suboccipital pseudomeningocele  - Continue off of antibiotics, no evidence of infection on CSF cultures sent 10/22, 10/24.   - CTH STAT if change in pts neurologic exam  - No AP/NSAIDs/therapeutic AC x14d postop, okay for dvt prophylaxis  -Therapies per PMNR     Acute Hypercapnic and Hypoxic Respiratory Failure  History of Asthma  - Patient on 2 L of oxygen at night , does not need during the day  - cont PTA inhalers  - Pulmonary toilet     Chronic anemia  - Transfuse if under 7      Thrombocytopenia  - Transfuse if under 50,000 and actively bleeding or if under 10,000     Depression   - cont PTA duloxetine     Hypertension   - Continue  metoprolol, amlodipine and  lisinopril  - Patient is also on triamterene hydrochlorothiazide at home but will hold for now given hyponatremia     Rheumatoid arthritis   - Per Rheum --hold home leflunomide and tocilizumab until follow up in the outpatient setting     Rash  -?  Due to Seroquel.  Has been discontinued.  Monitor.      DVT Prophylaxis  Current Active Medications for DVT Prophylaxis (From admission, onward)           Stop     enoxaparin (LOVENOX) injection 40 mg  40 mg,   Subcutaneous,   2 times per day         --                     PICC Line Single Lumen 10/07/24 Left Basilic Non-tunneled;Non-cuffed (Active)   Number of  days: 28         Code Status: Full Resuscitation    PCP:  Linda Torrez DO Nino Usanetashvili, MD,  [facility] [place] [facility]  11/4/2024   show

## 2024-12-03 ENCOUNTER — APPOINTMENT (OUTPATIENT)
Dept: CARDIOLOGY | Facility: CLINIC | Age: 74
End: 2024-12-03
Payer: MEDICARE

## 2024-12-03 PROCEDURE — 93306 TTE W/DOPPLER COMPLETE: CPT

## 2024-12-17 ENCOUNTER — APPOINTMENT (OUTPATIENT)
Dept: CARDIOLOGY | Facility: CLINIC | Age: 74
End: 2024-12-17
Payer: MEDICARE

## 2024-12-17 ENCOUNTER — NON-APPOINTMENT (OUTPATIENT)
Age: 74
End: 2024-12-17

## 2024-12-17 VITALS
DIASTOLIC BLOOD PRESSURE: 70 MMHG | HEART RATE: 92 BPM | BODY MASS INDEX: 25.92 KG/M2 | HEIGHT: 69 IN | WEIGHT: 175 LBS | SYSTOLIC BLOOD PRESSURE: 101 MMHG

## 2024-12-17 DIAGNOSIS — I70.0 ATHEROSCLEROSIS OF AORTA: ICD-10-CM

## 2024-12-17 DIAGNOSIS — I49.3 VENTRICULAR PREMATURE DEPOLARIZATION: ICD-10-CM

## 2024-12-17 DIAGNOSIS — I48.0 PAROXYSMAL ATRIAL FIBRILLATION: ICD-10-CM

## 2024-12-17 DIAGNOSIS — Z95.810 PRESENCE OF AUTOMATIC (IMPLANTABLE) CARDIAC DEFIBRILLATOR: ICD-10-CM

## 2024-12-17 DIAGNOSIS — E78.5 HYPERLIPIDEMIA, UNSPECIFIED: ICD-10-CM

## 2024-12-17 DIAGNOSIS — I47.29 OTHER VENTRICULAR TACHYCARDIA: ICD-10-CM

## 2024-12-17 DIAGNOSIS — I73.9 PERIPHERAL VASCULAR DISEASE, UNSPECIFIED: ICD-10-CM

## 2024-12-17 DIAGNOSIS — I71.40 ABDOMINAL AORTIC ANEURYSM, WITHOUT RUPTURE, UNSPECIFIED: ICD-10-CM

## 2024-12-17 DIAGNOSIS — I42.9 CARDIOMYOPATHY, UNSPECIFIED: ICD-10-CM

## 2024-12-17 DIAGNOSIS — I25.10 ATHEROSCLEROTIC HEART DISEASE OF NATIVE CORONARY ARTERY W/OUT ANGINA PECTORIS: ICD-10-CM

## 2024-12-17 PROCEDURE — 93000 ELECTROCARDIOGRAM COMPLETE: CPT

## 2024-12-17 PROCEDURE — G2211 COMPLEX E/M VISIT ADD ON: CPT

## 2024-12-17 PROCEDURE — 99214 OFFICE O/P EST MOD 30 MIN: CPT

## 2025-01-24 ENCOUNTER — APPOINTMENT (OUTPATIENT)
Dept: CARDIOLOGY | Facility: CLINIC | Age: 75
End: 2025-01-24
Payer: MEDICARE

## 2025-01-24 PROCEDURE — 93296 REM INTERROG EVL PM/IDS: CPT

## 2025-01-24 PROCEDURE — 93295 DEV INTERROG REMOTE 1/2/MLT: CPT

## 2025-01-30 ENCOUNTER — RX RENEWAL (OUTPATIENT)
Age: 75
End: 2025-01-30

## 2025-02-04 ENCOUNTER — APPOINTMENT (OUTPATIENT)
Dept: VASCULAR SURGERY | Facility: CLINIC | Age: 75
End: 2025-02-04
Payer: MEDICARE

## 2025-02-04 VITALS
BODY MASS INDEX: 26.36 KG/M2 | HEIGHT: 69 IN | WEIGHT: 178 LBS | TEMPERATURE: 97.8 F | RESPIRATION RATE: 14 BRPM | HEART RATE: 87 BPM | DIASTOLIC BLOOD PRESSURE: 67 MMHG | SYSTOLIC BLOOD PRESSURE: 101 MMHG | OXYGEN SATURATION: 96 %

## 2025-02-04 PROCEDURE — 99213 OFFICE O/P EST LOW 20 MIN: CPT

## 2025-02-04 PROCEDURE — 93978 VASCULAR STUDY: CPT

## 2025-03-03 ENCOUNTER — APPOINTMENT (OUTPATIENT)
Dept: CARDIOLOGY | Facility: CLINIC | Age: 75
End: 2025-03-03
Payer: MEDICARE

## 2025-03-03 PROCEDURE — 78452 HT MUSCLE IMAGE SPECT MULT: CPT

## 2025-03-03 PROCEDURE — A9500: CPT

## 2025-03-03 PROCEDURE — 93015 CV STRESS TEST SUPVJ I&R: CPT

## 2025-03-03 RX ORDER — REGADENOSON 0.08 MG/ML
0.4 INJECTION, SOLUTION INTRAVENOUS
Refills: 0 | Status: COMPLETED | OUTPATIENT
Start: 2025-03-03

## 2025-03-03 RX ADMIN — REGADENOSON 0 MG/5ML: 0.08 INJECTION, SOLUTION INTRAVENOUS at 00:00

## 2025-03-13 ENCOUNTER — RX RENEWAL (OUTPATIENT)
Age: 75
End: 2025-03-13

## 2025-03-20 ENCOUNTER — APPOINTMENT (OUTPATIENT)
Dept: CARDIOLOGY | Facility: CLINIC | Age: 75
End: 2025-03-20
Payer: MEDICARE

## 2025-03-20 VITALS
HEIGHT: 72 IN | DIASTOLIC BLOOD PRESSURE: 68 MMHG | HEART RATE: 92 BPM | BODY MASS INDEX: 24.11 KG/M2 | SYSTOLIC BLOOD PRESSURE: 100 MMHG | WEIGHT: 178 LBS

## 2025-03-20 DIAGNOSIS — I73.9 PERIPHERAL VASCULAR DISEASE, UNSPECIFIED: ICD-10-CM

## 2025-03-20 DIAGNOSIS — I42.9 CARDIOMYOPATHY, UNSPECIFIED: ICD-10-CM

## 2025-03-20 DIAGNOSIS — I47.29 OTHER VENTRICULAR TACHYCARDIA: ICD-10-CM

## 2025-03-20 DIAGNOSIS — Z95.810 PRESENCE OF AUTOMATIC (IMPLANTABLE) CARDIAC DEFIBRILLATOR: ICD-10-CM

## 2025-03-20 DIAGNOSIS — I71.40 ABDOMINAL AORTIC ANEURYSM, WITHOUT RUPTURE, UNSPECIFIED: ICD-10-CM

## 2025-03-20 DIAGNOSIS — E78.5 HYPERLIPIDEMIA, UNSPECIFIED: ICD-10-CM

## 2025-03-20 DIAGNOSIS — I25.10 ATHEROSCLEROTIC HEART DISEASE OF NATIVE CORONARY ARTERY W/OUT ANGINA PECTORIS: ICD-10-CM

## 2025-03-20 DIAGNOSIS — I51.9 HEART DISEASE, UNSPECIFIED: ICD-10-CM

## 2025-03-20 PROCEDURE — 93289 INTERROG DEVICE EVAL HEART: CPT

## 2025-03-20 PROCEDURE — 99214 OFFICE O/P EST MOD 30 MIN: CPT

## 2025-03-20 PROCEDURE — G2211 COMPLEX E/M VISIT ADD ON: CPT

## 2025-04-24 ENCOUNTER — APPOINTMENT (OUTPATIENT)
Dept: CARDIOLOGY | Facility: CLINIC | Age: 75
End: 2025-04-24
Payer: MEDICARE

## 2025-04-24 PROCEDURE — 93295 DEV INTERROG REMOTE 1/2/MLT: CPT

## 2025-04-24 PROCEDURE — 93296 REM INTERROG EVL PM/IDS: CPT

## 2025-06-16 ENCOUNTER — NON-APPOINTMENT (OUTPATIENT)
Age: 75
End: 2025-06-16

## 2025-06-18 ENCOUNTER — APPOINTMENT (OUTPATIENT)
Dept: CARDIOLOGY | Facility: CLINIC | Age: 75
End: 2025-06-18

## 2025-06-18 PROCEDURE — 93289 INTERROG DEVICE EVAL HEART: CPT

## 2025-09-10 ENCOUNTER — APPOINTMENT (OUTPATIENT)
Dept: CARDIOLOGY | Facility: CLINIC | Age: 75
End: 2025-09-10
Payer: MEDICARE

## 2025-09-10 VITALS
WEIGHT: 178 LBS | DIASTOLIC BLOOD PRESSURE: 76 MMHG | SYSTOLIC BLOOD PRESSURE: 92 MMHG | BODY MASS INDEX: 24.11 KG/M2 | HEART RATE: 174 BPM | HEIGHT: 72 IN

## 2025-09-10 DIAGNOSIS — Z95.810 PRESENCE OF AUTOMATIC (IMPLANTABLE) CARDIAC DEFIBRILLATOR: ICD-10-CM

## 2025-09-10 DIAGNOSIS — I42.9 CARDIOMYOPATHY, UNSPECIFIED: ICD-10-CM

## 2025-09-10 DIAGNOSIS — I25.10 ATHEROSCLEROTIC HEART DISEASE OF NATIVE CORONARY ARTERY W/OUT ANGINA PECTORIS: ICD-10-CM

## 2025-09-10 DIAGNOSIS — I71.40 ABDOMINAL AORTIC ANEURYSM, WITHOUT RUPTURE, UNSPECIFIED: ICD-10-CM

## 2025-09-10 DIAGNOSIS — I47.29 OTHER VENTRICULAR TACHYCARDIA: ICD-10-CM

## 2025-09-10 PROCEDURE — 99214 OFFICE O/P EST MOD 30 MIN: CPT

## 2025-09-10 PROCEDURE — 93000 ELECTROCARDIOGRAM COMPLETE: CPT | Mod: XU

## 2025-09-10 PROCEDURE — 93289 INTERROG DEVICE EVAL HEART: CPT

## 2025-09-10 PROCEDURE — G2211 COMPLEX E/M VISIT ADD ON: CPT

## (undated) DEVICE — TUBING HI PRESURE NONBRAID M/F 72"

## (undated) DEVICE — DRAPE 3/4 SHEET 52X76"

## (undated) DEVICE — SYR LUER LOK 30CC

## (undated) DEVICE — SUT VICRYL 4-0 27" PS-2 UNDYED

## (undated) DEVICE — GLV 7.5 PROTEXIS (WHITE)

## (undated) DEVICE — SUT VICRYL 2-0 27" CT-2 UNDYED

## (undated) DEVICE — NDL PERC BASEPLT 18GX7CM

## (undated) DEVICE — DRAPE FEMORAL ANGIOGRAPHY W TROUGH

## (undated) DEVICE — DRAPE C ARM UNIVERSAL

## (undated) DEVICE — SOL BAG NS 0.9% 1000ML

## (undated) DEVICE — SUT VICRYL 3-0 27" SH UNDYED

## (undated) DEVICE — BAG DECANTER 2

## (undated) DEVICE — SUT PROLENE 5-0 36" C-1

## (undated) DEVICE — VENODYNE/SCD SLEEVE CALF MEDIUM

## (undated) DEVICE — SYR CONTROL LUER LOK 10CC

## (undated) DEVICE — DRAPE DOME BAG 22"

## (undated) DEVICE — Device

## (undated) DEVICE — TORQUE DEVICE FOR GUIDEWIRE 0.0100.038"

## (undated) DEVICE — SUT VICRYL 2-0 36" CT-1

## (undated) DEVICE — FLOW SWITCH HP CONTROL DEVICE

## (undated) DEVICE — COVER ULTRASOUND PROBE T-SHAPED INTRAOP SM

## (undated) DEVICE — PACK VASCULAR